# Patient Record
Sex: FEMALE | Race: BLACK OR AFRICAN AMERICAN | Employment: OTHER | URBAN - METROPOLITAN AREA
[De-identification: names, ages, dates, MRNs, and addresses within clinical notes are randomized per-mention and may not be internally consistent; named-entity substitution may affect disease eponyms.]

---

## 2017-01-11 PROBLEM — G43.101 MIGRAINE WITH AURA AND WITH STATUS MIGRAINOSUS, NOT INTRACTABLE: Status: ACTIVE | Noted: 2017-01-11

## 2017-03-17 PROBLEM — I67.2 CEREBRAL ATHEROSCLEROSIS: Status: ACTIVE | Noted: 2017-03-17

## 2017-03-17 PROBLEM — M47.26 OSTEOARTHRITIS OF SPINE WITH RADICULOPATHY, LUMBAR REGION: Status: ACTIVE | Noted: 2017-03-17

## 2017-03-17 PROBLEM — E78.00 HYPERCHOLESTEROLEMIA: Status: ACTIVE | Noted: 2017-03-17

## 2018-04-12 ENCOUNTER — HOSPITAL ENCOUNTER (OUTPATIENT)
Age: 68
Discharge: HOME | End: 2018-04-14
Payer: COMMERCIAL

## 2018-04-12 LAB
ALBUMIN SERPL-MCNC: 4.4 G/DL (ref 3.5–5.2)
ALP BLD-CCNC: 91 U/L (ref 35–104)
ALT SERPL-CCNC: 10 U/L (ref 0–32)
ANION GAP SERPL CALCULATED.3IONS-SCNC: 20 MMOL/L (ref 7–16)
AST SERPL-CCNC: 13 U/L (ref 0–31)
BILIRUB SERPL-MCNC: 0.4 MG/DL (ref 0–1.2)
BUN BLDV-MCNC: 14 MG/DL (ref 8–23)
CALCIUM SERPL-MCNC: 9.7 MG/DL (ref 8.6–10.2)
CHLORIDE BLD-SCNC: 100 MMOL/L (ref 98–107)
CO2: 19 MMOL/L (ref 22–29)
CREAT SERPL-MCNC: 1.2 MG/DL (ref 0.5–1)
GFR AFRICAN AMERICAN: 54
GFR NON-AFRICAN AMERICAN: 54 ML/MIN/1.73
GLUCOSE BLD-MCNC: 99 MG/DL (ref 74–109)
POTASSIUM SERPL-SCNC: 3.8 MMOL/L (ref 3.5–5)
SODIUM BLD-SCNC: 139 MMOL/L (ref 132–146)
TOTAL PROTEIN: 7.7 G/DL (ref 6.4–8.3)

## 2018-04-12 PROCEDURE — 85025 COMPLETE CBC W/AUTO DIFF WBC: CPT

## 2018-04-12 PROCEDURE — 80053 COMPREHEN METABOLIC PANEL: CPT

## 2018-04-12 PROCEDURE — 36415 COLL VENOUS BLD VENIPUNCTURE: CPT

## 2018-04-13 LAB
ANISOCYTOSIS: ABNORMAL
BASOPHILS ABSOLUTE: 0.05 E9/L (ref 0–0.2)
BASOPHILS RELATIVE PERCENT: 0.4 % (ref 0–2)
BURR CELLS: ABNORMAL
EOSINOPHILS ABSOLUTE: 0.21 E9/L (ref 0.05–0.5)
EOSINOPHILS RELATIVE PERCENT: 1.6 % (ref 0–6)
HCT VFR BLD CALC: 36.1 % (ref 34–48)
HEMOGLOBIN: 11.4 G/DL (ref 11.5–15.5)
IMMATURE GRANULOCYTES #: 0.08 E9/L
IMMATURE GRANULOCYTES %: 0.6 % (ref 0–5)
LYMPHOCYTES ABSOLUTE: 2.32 E9/L (ref 1.5–4)
LYMPHOCYTES RELATIVE PERCENT: 17.4 % (ref 20–42)
MCH RBC QN AUTO: 27 PG (ref 26–35)
MCHC RBC AUTO-ENTMCNC: 31.6 % (ref 32–34.5)
MCV RBC AUTO: 85.3 FL (ref 80–99.9)
MONOCYTES ABSOLUTE: 2.45 E9/L (ref 0.1–0.95)
MONOCYTES RELATIVE PERCENT: 18.4 % (ref 2–12)
NEUTROPHILS ABSOLUTE: 8.23 E9/L (ref 1.8–7.3)
NEUTROPHILS RELATIVE PERCENT: 61.6 % (ref 43–80)
OVALOCYTES: ABNORMAL
PDW BLD-RTO: 17.5 FL (ref 11.5–15)
PLATELET # BLD: 436 E9/L (ref 130–450)
PMV BLD AUTO: 11.6 FL (ref 7–12)
POIKILOCYTES: ABNORMAL
POLYCHROMASIA: ABNORMAL
RBC # BLD: 4.23 E12/L (ref 3.5–5.5)
SCHISTOCYTES: ABNORMAL
WBC # BLD: 13.3 E9/L (ref 4.5–11.5)

## 2018-04-14 ENCOUNTER — HOSPITAL ENCOUNTER (OUTPATIENT)
Age: 68
Setting detail: OBSERVATION
LOS: 1 days | Discharge: HOME | End: 2018-04-14
Attending: INTERNAL MEDICINE | Admitting: INTERNAL MEDICINE
Payer: COMMERCIAL

## 2018-04-14 VITALS
OXYGEN SATURATION: 96 % | HEART RATE: 73 BPM | DIASTOLIC BLOOD PRESSURE: 59 MMHG | BODY MASS INDEX: 31.04 KG/M2 | SYSTOLIC BLOOD PRESSURE: 124 MMHG | HEIGHT: 59 IN | WEIGHT: 154 LBS | TEMPERATURE: 97.8 F | RESPIRATION RATE: 16 BRPM

## 2018-04-14 DIAGNOSIS — T78.3XXA ANGIOEDEMA, INITIAL ENCOUNTER: Primary | ICD-10-CM

## 2018-04-14 PROBLEM — I10 ESSENTIAL HYPERTENSION: Chronic | Status: ACTIVE | Noted: 2018-04-14

## 2018-04-14 PROBLEM — I67.2 CEREBRAL ATHEROSCLEROSIS: Status: RESOLVED | Noted: 2017-03-17 | Resolved: 2018-04-14

## 2018-04-14 PROBLEM — M47.26 OSTEOARTHRITIS OF SPINE WITH RADICULOPATHY, LUMBAR REGION: Status: RESOLVED | Noted: 2017-03-17 | Resolved: 2018-04-14

## 2018-04-14 PROBLEM — T46.4X5A ACE INHIBITOR-AGGRAVATED ANGIOEDEMA, INITIAL ENCOUNTER: Status: ACTIVE | Noted: 2018-04-14

## 2018-04-14 PROBLEM — E78.5 HYPERLIPIDEMIA LDL GOAL <100: Chronic | Status: ACTIVE | Noted: 2018-04-14

## 2018-04-14 PROBLEM — G43.101 MIGRAINE WITH AURA AND WITH STATUS MIGRAINOSUS, NOT INTRACTABLE: Status: RESOLVED | Noted: 2017-01-11 | Resolved: 2018-04-14

## 2018-04-14 PROBLEM — E66.9 OBESITY (BMI 30-39.9): Chronic | Status: ACTIVE | Noted: 2018-04-14

## 2018-04-14 PROBLEM — E78.00 HYPERCHOLESTEROLEMIA: Status: RESOLVED | Noted: 2017-03-17 | Resolved: 2018-04-14

## 2018-04-14 LAB
ANION GAP SERPL CALCULATED.3IONS-SCNC: 12 MMOL/L (ref 7–16)
BASOPHILS ABSOLUTE: 0.02 E9/L (ref 0–0.2)
BASOPHILS RELATIVE PERCENT: 0.3 % (ref 0–2)
BUN BLDV-MCNC: 21 MG/DL (ref 8–23)
CALCIUM SERPL-MCNC: 9.1 MG/DL (ref 8.6–10.2)
CHLORIDE BLD-SCNC: 108 MMOL/L (ref 98–107)
CO2: 20 MMOL/L (ref 22–29)
CREAT SERPL-MCNC: 1 MG/DL (ref 0.5–1)
EOSINOPHILS ABSOLUTE: 0 E9/L (ref 0.05–0.5)
EOSINOPHILS RELATIVE PERCENT: 0 % (ref 0–6)
GFR AFRICAN AMERICAN: >60
GFR NON-AFRICAN AMERICAN: >60 ML/MIN/1.73
GLUCOSE BLD-MCNC: 140 MG/DL (ref 74–109)
HBA1C MFR BLD: 5.4 % (ref 4.8–5.9)
HCT VFR BLD CALC: 34.6 % (ref 34–48)
HEMOGLOBIN: 11.3 G/DL (ref 11.5–15.5)
IMMATURE GRANULOCYTES #: 0.04 E9/L
IMMATURE GRANULOCYTES %: 0.6 % (ref 0–5)
LYMPHOCYTES ABSOLUTE: 0.77 E9/L (ref 1.5–4)
LYMPHOCYTES RELATIVE PERCENT: 11.5 % (ref 20–42)
MAGNESIUM: 2.5 MG/DL (ref 1.6–2.6)
MCH RBC QN AUTO: 26.9 PG (ref 26–35)
MCHC RBC AUTO-ENTMCNC: 32.7 % (ref 32–34.5)
MCV RBC AUTO: 82.4 FL (ref 80–99.9)
METER GLUCOSE: 122 MG/DL (ref 70–110)
METER GLUCOSE: 160 MG/DL (ref 70–110)
MONOCYTES ABSOLUTE: 0.09 E9/L (ref 0.1–0.95)
MONOCYTES RELATIVE PERCENT: 1.3 % (ref 2–12)
NEUTROPHILS ABSOLUTE: 5.75 E9/L (ref 1.8–7.3)
NEUTROPHILS RELATIVE PERCENT: 86.3 % (ref 43–80)
PDW BLD-RTO: 16.7 FL (ref 11.5–15)
PLATELET # BLD: 437 E9/L (ref 130–450)
PMV BLD AUTO: 10.8 FL (ref 7–12)
POTASSIUM SERPL-SCNC: 4 MMOL/L (ref 3.5–5)
RBC # BLD: 4.2 E12/L (ref 3.5–5.5)
SODIUM BLD-SCNC: 140 MMOL/L (ref 132–146)
WBC # BLD: 6.7 E9/L (ref 4.5–11.5)

## 2018-04-14 PROCEDURE — 2580000003 HC RX 258: Performed by: INTERNAL MEDICINE

## 2018-04-14 PROCEDURE — 2580000003 HC RX 258: Performed by: EMERGENCY MEDICINE

## 2018-04-14 PROCEDURE — G0378 HOSPITAL OBSERVATION PER HR: HCPCS

## 2018-04-14 PROCEDURE — S0028 INJECTION, FAMOTIDINE, 20 MG: HCPCS | Performed by: EMERGENCY MEDICINE

## 2018-04-14 PROCEDURE — 6370000000 HC RX 637 (ALT 250 FOR IP): Performed by: INTERNAL MEDICINE

## 2018-04-14 PROCEDURE — 80048 BASIC METABOLIC PNL TOTAL CA: CPT

## 2018-04-14 PROCEDURE — 99285 EMERGENCY DEPT VISIT HI MDM: CPT

## 2018-04-14 PROCEDURE — 6360000002 HC RX W HCPCS: Performed by: EMERGENCY MEDICINE

## 2018-04-14 PROCEDURE — 83036 HEMOGLOBIN GLYCOSYLATED A1C: CPT

## 2018-04-14 PROCEDURE — 2500000003 HC RX 250 WO HCPCS: Performed by: EMERGENCY MEDICINE

## 2018-04-14 PROCEDURE — 96375 TX/PRO/DX INJ NEW DRUG ADDON: CPT

## 2018-04-14 PROCEDURE — 82962 GLUCOSE BLOOD TEST: CPT

## 2018-04-14 PROCEDURE — 96374 THER/PROPH/DIAG INJ IV PUSH: CPT

## 2018-04-14 PROCEDURE — 36415 COLL VENOUS BLD VENIPUNCTURE: CPT

## 2018-04-14 PROCEDURE — 83735 ASSAY OF MAGNESIUM: CPT

## 2018-04-14 PROCEDURE — 85025 COMPLETE CBC W/AUTO DIFF WBC: CPT

## 2018-04-14 PROCEDURE — 6360000002 HC RX W HCPCS: Performed by: INTERNAL MEDICINE

## 2018-04-14 PROCEDURE — 94761 N-INVAS EAR/PLS OXIMETRY MLT: CPT

## 2018-04-14 PROCEDURE — 96372 THER/PROPH/DIAG INJ SC/IM: CPT

## 2018-04-14 RX ORDER — 0.9 % SODIUM CHLORIDE 0.9 %
500 INTRAVENOUS SOLUTION INTRAVENOUS ONCE
Status: COMPLETED | OUTPATIENT
Start: 2018-04-14 | End: 2018-04-14

## 2018-04-14 RX ORDER — ACETAMINOPHEN 325 MG/1
650 TABLET ORAL EVERY 4 HOURS PRN
Status: DISCONTINUED | OUTPATIENT
Start: 2018-04-14 | End: 2018-04-14 | Stop reason: SDUPTHER

## 2018-04-14 RX ORDER — FOLIC ACID 1 MG/1
1 TABLET ORAL DAILY
Status: DISCONTINUED | OUTPATIENT
Start: 2018-04-14 | End: 2018-04-14 | Stop reason: HOSPADM

## 2018-04-14 RX ORDER — DEXTROSE MONOHYDRATE 25 G/50ML
12.5 INJECTION, SOLUTION INTRAVENOUS PRN
Status: DISCONTINUED | OUTPATIENT
Start: 2018-04-14 | End: 2018-04-14 | Stop reason: HOSPADM

## 2018-04-14 RX ORDER — DICLOFENAC POTASSIUM 50 MG/1
50 TABLET, FILM COATED ORAL 3 TIMES DAILY PRN
Status: DISCONTINUED | OUTPATIENT
Start: 2018-04-14 | End: 2018-04-14 | Stop reason: CLARIF

## 2018-04-14 RX ORDER — HYDROCHLOROTHIAZIDE 25 MG/1
25 TABLET ORAL DAILY
Qty: 30 TABLET | Refills: 3 | Status: SHIPPED | OUTPATIENT
Start: 2018-04-14 | End: 2020-01-03

## 2018-04-14 RX ORDER — ASPIRIN 81 MG/1
81 TABLET ORAL 2 TIMES DAILY
Status: DISCONTINUED | OUTPATIENT
Start: 2018-04-14 | End: 2018-04-14 | Stop reason: HOSPADM

## 2018-04-14 RX ORDER — ONDANSETRON 2 MG/ML
4 INJECTION INTRAMUSCULAR; INTRAVENOUS EVERY 6 HOURS PRN
Status: DISCONTINUED | OUTPATIENT
Start: 2018-04-14 | End: 2018-04-14 | Stop reason: HOSPADM

## 2018-04-14 RX ORDER — SODIUM CHLORIDE 0.9 % (FLUSH) 0.9 %
10 SYRINGE (ML) INJECTION EVERY 12 HOURS SCHEDULED
Status: DISCONTINUED | OUTPATIENT
Start: 2018-04-14 | End: 2018-04-14 | Stop reason: HOSPADM

## 2018-04-14 RX ORDER — DIPHENHYDRAMINE HYDROCHLORIDE 50 MG/ML
50 INJECTION INTRAMUSCULAR; INTRAVENOUS ONCE
Status: COMPLETED | OUTPATIENT
Start: 2018-04-14 | End: 2018-04-14

## 2018-04-14 RX ORDER — DEXTROSE MONOHYDRATE 50 MG/ML
100 INJECTION, SOLUTION INTRAVENOUS PRN
Status: DISCONTINUED | OUTPATIENT
Start: 2018-04-14 | End: 2018-04-14 | Stop reason: HOSPADM

## 2018-04-14 RX ORDER — SODIUM CHLORIDE 0.9 % (FLUSH) 0.9 %
10 SYRINGE (ML) INJECTION PRN
Status: DISCONTINUED | OUTPATIENT
Start: 2018-04-14 | End: 2018-04-14 | Stop reason: HOSPADM

## 2018-04-14 RX ORDER — DIPHENHYDRAMINE HCL 25 MG
25 TABLET ORAL EVERY 6 HOURS PRN
Qty: 30 TABLET | Refills: 0 | Status: SHIPPED | OUTPATIENT
Start: 2018-04-14 | End: 2018-05-14

## 2018-04-14 RX ORDER — ACETAMINOPHEN 325 MG/1
650 TABLET ORAL EVERY 4 HOURS PRN
Status: DISCONTINUED | OUTPATIENT
Start: 2018-04-14 | End: 2018-04-14 | Stop reason: HOSPADM

## 2018-04-14 RX ORDER — PREDNISONE 1 MG/1
5 TABLET ORAL
Status: DISCONTINUED | OUTPATIENT
Start: 2018-04-14 | End: 2018-04-14 | Stop reason: HOSPADM

## 2018-04-14 RX ORDER — SODIUM CHLORIDE 0.9 % (FLUSH) 0.9 %
10 SYRINGE (ML) INJECTION EVERY 12 HOURS SCHEDULED
Status: DISCONTINUED | OUTPATIENT
Start: 2018-04-14 | End: 2018-04-14 | Stop reason: SDUPTHER

## 2018-04-14 RX ORDER — PANTOPRAZOLE SODIUM 40 MG/1
40 TABLET, DELAYED RELEASE ORAL
Status: DISCONTINUED | OUTPATIENT
Start: 2018-04-14 | End: 2018-04-14 | Stop reason: HOSPADM

## 2018-04-14 RX ORDER — IBUPROFEN 600 MG/1
600 TABLET ORAL 3 TIMES DAILY PRN
Status: DISCONTINUED | OUTPATIENT
Start: 2018-04-14 | End: 2018-04-14 | Stop reason: HOSPADM

## 2018-04-14 RX ORDER — DIPHENHYDRAMINE HCL 25 MG
25 TABLET ORAL EVERY 6 HOURS PRN
Status: DISCONTINUED | OUTPATIENT
Start: 2018-04-14 | End: 2018-04-14 | Stop reason: HOSPADM

## 2018-04-14 RX ORDER — SODIUM CHLORIDE 0.9 % (FLUSH) 0.9 %
10 SYRINGE (ML) INJECTION PRN
Status: DISCONTINUED | OUTPATIENT
Start: 2018-04-14 | End: 2018-04-14 | Stop reason: SDUPTHER

## 2018-04-14 RX ORDER — DEXAMETHASONE SODIUM PHOSPHATE 10 MG/ML
10 INJECTION INTRAMUSCULAR; INTRAVENOUS ONCE
Status: COMPLETED | OUTPATIENT
Start: 2018-04-14 | End: 2018-04-14

## 2018-04-14 RX ORDER — ATORVASTATIN CALCIUM 20 MG/1
20 TABLET, FILM COATED ORAL NIGHTLY
Status: DISCONTINUED | OUTPATIENT
Start: 2018-04-14 | End: 2018-04-14 | Stop reason: HOSPADM

## 2018-04-14 RX ORDER — NICOTINE POLACRILEX 4 MG
15 LOZENGE BUCCAL PRN
Status: DISCONTINUED | OUTPATIENT
Start: 2018-04-14 | End: 2018-04-14 | Stop reason: HOSPADM

## 2018-04-14 RX ORDER — METHYLPREDNISOLONE SODIUM SUCCINATE 125 MG/2ML
125 INJECTION, POWDER, LYOPHILIZED, FOR SOLUTION INTRAMUSCULAR; INTRAVENOUS ONCE
Status: COMPLETED | OUTPATIENT
Start: 2018-04-14 | End: 2018-04-14

## 2018-04-14 RX ORDER — METHYLPREDNISOLONE SODIUM SUCCINATE 40 MG/ML
40 INJECTION, POWDER, LYOPHILIZED, FOR SOLUTION INTRAMUSCULAR; INTRAVENOUS DAILY
Status: DISCONTINUED | OUTPATIENT
Start: 2018-04-15 | End: 2018-04-14 | Stop reason: HOSPADM

## 2018-04-14 RX ORDER — SODIUM CHLORIDE 9 MG/ML
INJECTION, SOLUTION INTRAVENOUS CONTINUOUS
Status: DISCONTINUED | OUTPATIENT
Start: 2018-04-14 | End: 2018-04-14 | Stop reason: HOSPADM

## 2018-04-14 RX ORDER — GABAPENTIN 400 MG/1
400 CAPSULE ORAL EVERY 6 HOURS
Status: DISCONTINUED | OUTPATIENT
Start: 2018-04-14 | End: 2018-04-14 | Stop reason: HOSPADM

## 2018-04-14 RX ADMIN — DEXAMETHASONE SODIUM PHOSPHATE 10 MG: 10 INJECTION INTRAMUSCULAR; INTRAVENOUS at 02:50

## 2018-04-14 RX ADMIN — PANTOPRAZOLE SODIUM 40 MG: 40 TABLET, DELAYED RELEASE ORAL at 10:40

## 2018-04-14 RX ADMIN — Medication 10 ML: at 10:41

## 2018-04-14 RX ADMIN — FAMOTIDINE 20 MG: 10 INJECTION INTRAVENOUS at 02:50

## 2018-04-14 RX ADMIN — DIPHENHYDRAMINE HYDROCHLORIDE 50 MG: 50 INJECTION, SOLUTION INTRAMUSCULAR; INTRAVENOUS at 02:50

## 2018-04-14 RX ADMIN — ASPIRIN 81 MG: 81 TABLET, COATED ORAL at 10:40

## 2018-04-14 RX ADMIN — METHYLPREDNISOLONE SODIUM SUCCINATE 125 MG: 125 INJECTION, POWDER, FOR SOLUTION INTRAMUSCULAR; INTRAVENOUS at 02:50

## 2018-04-14 RX ADMIN — GABAPENTIN 400 MG: 400 CAPSULE ORAL at 10:40

## 2018-04-14 RX ADMIN — ACETAMINOPHEN 650 MG: 325 TABLET ORAL at 10:49

## 2018-04-14 RX ADMIN — PREDNISONE 5 MG: 5 TABLET ORAL at 11:36

## 2018-04-14 RX ADMIN — ENOXAPARIN SODIUM 40 MG: 40 INJECTION, SOLUTION INTRAVENOUS; SUBCUTANEOUS at 10:40

## 2018-04-14 RX ADMIN — SODIUM CHLORIDE: 9 INJECTION, SOLUTION INTRAVENOUS at 10:40

## 2018-04-14 RX ADMIN — FOLIC ACID 1 MG: 1 TABLET ORAL at 10:40

## 2018-04-14 RX ADMIN — SODIUM CHLORIDE 500 ML: 9 INJECTION, SOLUTION INTRAVENOUS at 02:49

## 2018-04-14 RX ADMIN — INSULIN LISPRO 1 UNITS: 100 INJECTION, SOLUTION INTRAVENOUS; SUBCUTANEOUS at 11:36

## 2018-04-14 ASSESSMENT — PAIN SCALES - GENERAL
PAINLEVEL_OUTOF10: 3
PAINLEVEL_OUTOF10: 0

## 2018-04-14 ASSESSMENT — PAIN DESCRIPTION - PROGRESSION: CLINICAL_PROGRESSION: GRADUALLY WORSENING

## 2018-04-14 ASSESSMENT — PAIN DESCRIPTION - FREQUENCY: FREQUENCY: CONTINUOUS

## 2018-04-14 ASSESSMENT — PAIN DESCRIPTION - ONSET: ONSET: GRADUAL

## 2018-04-14 ASSESSMENT — PAIN DESCRIPTION - DESCRIPTORS: DESCRIPTORS: ACHING;DISCOMFORT;CONSTANT

## 2018-04-14 ASSESSMENT — PAIN DESCRIPTION - PAIN TYPE: TYPE: CHRONIC PAIN

## 2018-04-14 ASSESSMENT — PAIN DESCRIPTION - ORIENTATION: ORIENTATION: MID

## 2018-04-14 ASSESSMENT — PAIN DESCRIPTION - LOCATION: LOCATION: BACK

## 2018-04-17 PROBLEM — M51.16 INTERVERTEBRAL DISC DISORDERS WITH RADICULOPATHY, LUMBAR REGION: Status: ACTIVE | Noted: 2018-04-17

## 2018-08-23 ENCOUNTER — HOSPITAL ENCOUNTER (OUTPATIENT)
Age: 68
Discharge: HOME | End: 2018-08-25
Payer: COMMERCIAL

## 2018-08-23 LAB
ALBUMIN SERPL-MCNC: 4.6 G/DL (ref 3.5–5.2)
ALP BLD-CCNC: 87 U/L (ref 35–104)
ALT SERPL-CCNC: 9 U/L (ref 0–32)
ANION GAP SERPL CALCULATED.3IONS-SCNC: 16 MMOL/L (ref 7–16)
ANISOCYTOSIS: ABNORMAL
AST SERPL-CCNC: 15 U/L (ref 0–31)
BASOPHILS ABSOLUTE: 0.05 E9/L (ref 0–0.2)
BASOPHILS RELATIVE PERCENT: 0.4 % (ref 0–2)
BILIRUB SERPL-MCNC: 0.3 MG/DL (ref 0–1.2)
BUN BLDV-MCNC: 14 MG/DL (ref 8–23)
BURR CELLS: ABNORMAL
CALCIUM SERPL-MCNC: 9.5 MG/DL (ref 8.6–10.2)
CHLORIDE BLD-SCNC: 101 MMOL/L (ref 98–107)
CO2: 22 MMOL/L (ref 22–29)
CREAT SERPL-MCNC: 1.3 MG/DL (ref 0.5–1)
EOSINOPHILS ABSOLUTE: 0.03 E9/L (ref 0.05–0.5)
EOSINOPHILS RELATIVE PERCENT: 0.3 % (ref 0–6)
GFR AFRICAN AMERICAN: 50
GFR NON-AFRICAN AMERICAN: 50 ML/MIN/1.73
GLUCOSE BLD-MCNC: 109 MG/DL (ref 74–109)
HCT VFR BLD CALC: 36.6 % (ref 34–48)
HEMOGLOBIN: 11.2 G/DL (ref 11.5–15.5)
HYPOCHROMIA: ABNORMAL
IMMATURE GRANULOCYTES #: 0.06 E9/L
IMMATURE GRANULOCYTES %: 0.5 % (ref 0–5)
LYMPHOCYTES ABSOLUTE: 2.42 E9/L (ref 1.5–4)
LYMPHOCYTES RELATIVE PERCENT: 21.7 % (ref 20–42)
MCH RBC QN AUTO: 24.6 PG (ref 26–35)
MCHC RBC AUTO-ENTMCNC: 30.6 % (ref 32–34.5)
MCV RBC AUTO: 80.4 FL (ref 80–99.9)
MONOCYTES ABSOLUTE: 0.65 E9/L (ref 0.1–0.95)
MONOCYTES RELATIVE PERCENT: 5.8 % (ref 2–12)
NEUTROPHILS ABSOLUTE: 7.92 E9/L (ref 1.8–7.3)
NEUTROPHILS RELATIVE PERCENT: 71.3 % (ref 43–80)
OVALOCYTES: ABNORMAL
PDW BLD-RTO: 22.5 FL (ref 11.5–15)
PLATELET # BLD: 444 E9/L (ref 130–450)
PMV BLD AUTO: 11.3 FL (ref 7–12)
POIKILOCYTES: ABNORMAL
POLYCHROMASIA: ABNORMAL
POTASSIUM SERPL-SCNC: 4.9 MMOL/L (ref 3.5–5)
RBC # BLD: 4.55 E12/L (ref 3.5–5.5)
SCHISTOCYTES: ABNORMAL
SODIUM BLD-SCNC: 139 MMOL/L (ref 132–146)
TARGET CELLS: ABNORMAL
TOTAL PROTEIN: 8.6 G/DL (ref 6.4–8.3)
WBC # BLD: 11.1 E9/L (ref 4.5–11.5)

## 2018-08-23 PROCEDURE — 85025 COMPLETE CBC W/AUTO DIFF WBC: CPT

## 2018-08-23 PROCEDURE — 80053 COMPREHEN METABOLIC PANEL: CPT

## 2018-09-25 ENCOUNTER — HOSPITAL ENCOUNTER (OUTPATIENT)
Dept: GENERAL RADIOLOGY | Age: 68
Discharge: HOME | End: 2018-09-27
Payer: COMMERCIAL

## 2018-09-25 DIAGNOSIS — R13.10 DYSPHAGIA, UNSPECIFIED TYPE: ICD-10-CM

## 2018-09-25 PROCEDURE — 92611 MOTION FLUOROSCOPY/SWALLOW: CPT

## 2018-09-25 PROCEDURE — 74230 X-RAY XM SWLNG FUNCJ C+: CPT

## 2018-09-25 PROCEDURE — G8996 SWALLOW CURRENT STATUS: HCPCS

## 2018-09-25 PROCEDURE — 2500000003 HC RX 250 WO HCPCS: Performed by: INTERNAL MEDICINE

## 2018-09-25 PROCEDURE — G8998 SWALLOW D/C STATUS: HCPCS

## 2018-09-25 PROCEDURE — G8997 SWALLOW GOAL STATUS: HCPCS

## 2018-09-25 RX ADMIN — BARIUM SULFATE 44 G: 0.6 CREAM ORAL at 11:53

## 2018-09-25 RX ADMIN — BARIUM SULFATE 88 ML: 960 POWDER, FOR SUSPENSION ORAL at 11:53

## 2018-09-25 NOTE — PROGRESS NOTES
pharyngeal wall      These residuals were noted for []thin []nectar[] honey [x]pureed [x]solid)        Which [x]cleared []did not clear []partially cleared []mostly cleared       With [x]cued []spontaneous     [x]double swallow []multiple swallow (  times)       [x]liquid chaser      []Residuals in the vallecula due to inadequate epiglottic inversion        These residuals were noted for []thin []nectar[] honey []pureed []solid)        Which  []cleared []did not clear []partially cleared []mostly cleared       With []cued []spontaneous     []double swallow []multiple swallow (  times)       []liquid chaser        Pyriform Sinuses    []No significant residuals were noted in the pyriform sinuses      [x] Residuals in the pyriform sinuses were noted due to      [x]pharyngeal weakness      []cricopharyngeal dysfunction. These residuals were noted for []thin []nectar[] honey [x]pureed [x]solid        Which  [x]cleared []did not clear []partially cleared []mostly cleared        With [x]cued []spontaneous     [x]double swallow []multiple swallow (  times)       [x]liquid chaser      LARYNGEAL PENETRATION   [x]Laryngeal penetration was not present during this evaluation      []Laryngeal penetration occurred prior to aspiration. Further details under aspiration section. []Laryngeal penetration occurred in the absence of aspiration:       []BEFORE the swallow for []thin []nectar []honey[] pureed []solid           due to: []decreased bolus formation       []premature pharyngeal entry       []delayed pharyngeal swallow           which  []cleared from the laryngeal vestibule spontaneously (transient)     []cleared from the laryngeal vestibule with a cued, re-directive throat clear       []remained in the laryngeal vestibule.       []penetrated deep into the laryngeal vestibule (to the level of the true vocal folds)      Laryngeal penetration was  []trace []mild []moderate []marked []severe          And occurred

## 2018-10-02 ENCOUNTER — OFFICE VISIT (OUTPATIENT)
Dept: ORTHOPEDIC SURGERY | Age: 68
End: 2018-10-02
Payer: COMMERCIAL

## 2018-10-02 VITALS
BODY MASS INDEX: 30.24 KG/M2 | SYSTOLIC BLOOD PRESSURE: 133 MMHG | HEIGHT: 59 IN | TEMPERATURE: 97.7 F | HEART RATE: 73 BPM | WEIGHT: 150 LBS | DIASTOLIC BLOOD PRESSURE: 72 MMHG

## 2018-10-02 DIAGNOSIS — M79.642 LEFT HAND PAIN: Primary | ICD-10-CM

## 2018-10-02 PROCEDURE — 1101F PT FALLS ASSESS-DOCD LE1/YR: CPT | Performed by: ORTHOPAEDIC SURGERY

## 2018-10-02 PROCEDURE — G8417 CALC BMI ABV UP PARAM F/U: HCPCS | Performed by: ORTHOPAEDIC SURGERY

## 2018-10-02 PROCEDURE — 3017F COLORECTAL CA SCREEN DOC REV: CPT | Performed by: ORTHOPAEDIC SURGERY

## 2018-10-02 PROCEDURE — G8598 ASA/ANTIPLAT THER USED: HCPCS | Performed by: ORTHOPAEDIC SURGERY

## 2018-10-02 PROCEDURE — G8427 DOCREV CUR MEDS BY ELIG CLIN: HCPCS | Performed by: ORTHOPAEDIC SURGERY

## 2018-10-02 PROCEDURE — 99203 OFFICE O/P NEW LOW 30 MIN: CPT | Performed by: ORTHOPAEDIC SURGERY

## 2018-10-02 PROCEDURE — 1123F ACP DISCUSS/DSCN MKR DOCD: CPT | Performed by: ORTHOPAEDIC SURGERY

## 2018-10-02 PROCEDURE — 1090F PRES/ABSN URINE INCON ASSESS: CPT | Performed by: ORTHOPAEDIC SURGERY

## 2018-10-02 PROCEDURE — G8484 FLU IMMUNIZE NO ADMIN: HCPCS | Performed by: ORTHOPAEDIC SURGERY

## 2018-10-02 PROCEDURE — 4004F PT TOBACCO SCREEN RCVD TLK: CPT | Performed by: ORTHOPAEDIC SURGERY

## 2018-10-02 PROCEDURE — 4040F PNEUMOC VAC/ADMIN/RCVD: CPT | Performed by: ORTHOPAEDIC SURGERY

## 2018-10-02 PROCEDURE — G8399 PT W/DXA RESULTS DOCUMENT: HCPCS | Performed by: ORTHOPAEDIC SURGERY

## 2018-10-02 RX ORDER — HYDROCODONE BITARTRATE AND ACETAMINOPHEN 7.5; 325 MG/1; MG/1
1 TABLET ORAL EVERY 8 HOURS PRN
COMMUNITY

## 2018-10-02 RX ORDER — METHOCARBAMOL 750 MG/1
750 TABLET, FILM COATED ORAL 3 TIMES DAILY
COMMUNITY

## 2018-10-02 RX ORDER — FAMOTIDINE 20 MG/1
20 TABLET, FILM COATED ORAL 2 TIMES DAILY
COMMUNITY

## 2018-10-02 RX ORDER — LISINOPRIL AND HYDROCHLOROTHIAZIDE 12.5; 1 MG/1; MG/1
1 TABLET ORAL DAILY
COMMUNITY

## 2018-10-02 NOTE — PROGRESS NOTES
Chief Complaint:   Chief Complaint   Patient presents with    Finger Pain     Lt 5th finger pain, referred by Dr Karli Dunham for consult. No x-rays       HPI     Valentine Mittal is a 79 y.o. female, who presents With occasional pain and transient mechanical difficulty with a left small finger, onset has been indeterminant and symptoms are intermittent. Patient states this was evaluated by her rheumatologist and he referred her here for evaluation. She has had long-standing rheumatoid arthritis and is on disease modifying agents which for the most part have been controlling her previous symptoms. She is left-hand dominant homemaker. No numbness tingling in the fingers no other active joint complaints. She is status post bilateral shoulder surgery performed elsewhere. No active issues and those regards. Allergies; medications; past medical, surgical, family, and social history; and problem list have been reviewed today and updated as indicated in this encounter - see below following Ortho specifics. Musculoskeletal: Upper extremity show no deformities, no active synovitis in the hands. Patient exhibits symmetric hyperextensibility of the MP and PIP joints bilaterally without contracture or swan necking. Following full active extension of the MPs and  there is mild snapping of the small finger on the right hand without active triggering, flexor tendons are intact without tenderness or locking. Radiologic Studies: Deferred    ASSESSMENT/PLAN:    Elke Medellin was seen today for finger pain. Diagnoses and all orders for this visit:    Left hand pain  -     Cancel: XR HAND LEFT (MIN 3 VIEWS)     I think the patient is having mild functional snapping of the finger without pathological issue, no evidence for tendon or significant synovitis problems at this time. Reassurance was offered and accepted, questions were asked and answered, follow-up as needed. Return if symptoms worsen or fail to improve.        Kiki Nuñez Charles Hurley MD    10/2/2018  5:01 PM      Patient Active Problem List   Diagnosis    GERD (gastroesophageal reflux disease)    Obesity (BMI 30-39. 9)    Hyperlipidemia LDL goal <100    Essential hypertension    Angioedema of lips    ACE inhibitor-aggravated angioedema, initial encounter    Intervertebral disc disorders with radiculopathy, lumbar region    Chronic migraine       Past Medical History:   Diagnosis Date    Acid reflux     Chronic back pain     Constipation     Headache(784.0)     History of sinus problem     Hyperlipidemia     Hypertension     Memory loss, short term     Neuropathy     Osteoarthritis     Pain in neck     Rheumatoid arthritis(714.0)     Dr. Aravind Cole in ears     TIA (transient ischemic attack)        Past Surgical History:   Procedure Laterality Date    CARDIOVASCULAR STRESS TEST  2003    normal    COLONOSCOPY  2006    Normal.  Re-referred for diagnostic on 9/10    HYSTERECTOMY      SHOULDER SURGERY Left     SHOULDER SURGERY Right     TOOTH EXTRACTION Right October 2014    TUBAL LIGATION      1978       Current Outpatient Prescriptions   Medication Sig Dispense Refill    HYDROcodone-acetaminophen ([address]) 7.5-325 MG per tablet Take 1 tablet by mouth every 8 hours as needed for Pain. Laurie Peppers lisinopril-hydrochlorothiazide (PRINZIDE;ZESTORETIC) 10-12.5 MG per tablet Take 1 tablet by mouth daily      famotidine (PEPCID) 20 MG tablet Take 20 mg by mouth 2 times daily      methocarbamol (ROBAXIN) 750 MG tablet Take 750 mg by mouth 3 times daily      gabapentin (NEURONTIN) 600 MG tablet Take 1 tablet by mouth 4 times daily for 30 days. . 120 tablet 5    aspirin 81 MG EC tablet Take 1 tablet by mouth 2 times daily 60 tablet 5    diclofenac (CATAFLAM) 50 MG tablet Take 1 tablet by mouth 3 times daily as needed (early migraine) 90 tablet 5    atorvastatin (LIPITOR) 20 MG tablet Take 1 tablet by mouth daily 30 tablet 5    diclofenac sodium 1 % GEL Apply 2 g

## 2018-11-01 ENCOUNTER — HOSPITAL ENCOUNTER (OUTPATIENT)
Age: 68
Discharge: HOME | End: 2018-11-03
Payer: COMMERCIAL

## 2018-11-01 LAB
ALBUMIN SERPL-MCNC: 4.3 G/DL (ref 3.5–5.2)
ALP BLD-CCNC: 65 U/L (ref 35–104)
ALT SERPL-CCNC: 8 U/L (ref 0–32)
ANION GAP SERPL CALCULATED.3IONS-SCNC: 13 MMOL/L (ref 7–16)
ANISOCYTOSIS: ABNORMAL
AST SERPL-CCNC: 14 U/L (ref 0–31)
BASOPHILS ABSOLUTE: 0.06 E9/L (ref 0–0.2)
BASOPHILS RELATIVE PERCENT: 0.7 % (ref 0–2)
BILIRUB SERPL-MCNC: 0.2 MG/DL (ref 0–1.2)
BUN BLDV-MCNC: 12 MG/DL (ref 8–23)
BURR CELLS: ABNORMAL
C-REACTIVE PROTEIN: 0.2 MG/DL (ref 0–0.4)
CALCIUM SERPL-MCNC: 9.2 MG/DL (ref 8.6–10.2)
CHLORIDE BLD-SCNC: 104 MMOL/L (ref 98–107)
CO2: 23 MMOL/L (ref 22–29)
CREAT SERPL-MCNC: 1.1 MG/DL (ref 0.5–1)
EOSINOPHILS ABSOLUTE: 0.16 E9/L (ref 0.05–0.5)
EOSINOPHILS RELATIVE PERCENT: 2 % (ref 0–6)
GFR AFRICAN AMERICAN: >60
GFR NON-AFRICAN AMERICAN: >60 ML/MIN/1.73
GLUCOSE BLD-MCNC: 90 MG/DL (ref 74–109)
HCT VFR BLD CALC: 34.6 % (ref 34–48)
HEMOGLOBIN: 10.7 G/DL (ref 11.5–15.5)
IMMATURE GRANULOCYTES #: 0.04 E9/L
IMMATURE GRANULOCYTES %: 0.5 % (ref 0–5)
LYMPHOCYTES ABSOLUTE: 1.2 E9/L (ref 1.5–4)
LYMPHOCYTES RELATIVE PERCENT: 14.6 % (ref 20–42)
MCH RBC QN AUTO: 24.9 PG (ref 26–35)
MCHC RBC AUTO-ENTMCNC: 30.9 % (ref 32–34.5)
MCV RBC AUTO: 80.5 FL (ref 80–99.9)
MONOCYTES ABSOLUTE: 0.77 E9/L (ref 0.1–0.95)
MONOCYTES RELATIVE PERCENT: 9.4 % (ref 2–12)
NEUTROPHILS ABSOLUTE: 5.97 E9/L (ref 1.8–7.3)
NEUTROPHILS RELATIVE PERCENT: 72.8 % (ref 43–80)
OVALOCYTES: ABNORMAL
PDW BLD-RTO: 21.8 FL (ref 11.5–15)
PLATELET # BLD: 454 E9/L (ref 130–450)
PMV BLD AUTO: 10.7 FL (ref 7–12)
POIKILOCYTES: ABNORMAL
POTASSIUM SERPL-SCNC: 3.8 MMOL/L (ref 3.5–5)
RBC # BLD: 4.3 E12/L (ref 3.5–5.5)
SCHISTOCYTES: ABNORMAL
SODIUM BLD-SCNC: 140 MMOL/L (ref 132–146)
TARGET CELLS: ABNORMAL
TOTAL PROTEIN: 7.7 G/DL (ref 6.4–8.3)
WBC # BLD: 8.2 E9/L (ref 4.5–11.5)

## 2018-11-01 PROCEDURE — 85025 COMPLETE CBC W/AUTO DIFF WBC: CPT

## 2018-11-01 PROCEDURE — 80053 COMPREHEN METABOLIC PANEL: CPT

## 2018-11-01 PROCEDURE — 36415 COLL VENOUS BLD VENIPUNCTURE: CPT

## 2018-11-01 PROCEDURE — 86140 C-REACTIVE PROTEIN: CPT

## 2019-01-08 ENCOUNTER — OFFICE VISIT (OUTPATIENT)
Dept: NEUROLOGY | Age: 69
End: 2019-01-08
Payer: COMMERCIAL

## 2019-01-08 VITALS
SYSTOLIC BLOOD PRESSURE: 130 MMHG | DIASTOLIC BLOOD PRESSURE: 80 MMHG | BODY MASS INDEX: 27.82 KG/M2 | RESPIRATION RATE: 12 BRPM | OXYGEN SATURATION: 99 % | WEIGHT: 138 LBS | HEIGHT: 59 IN | HEART RATE: 108 BPM

## 2019-01-08 DIAGNOSIS — M54.5 CHRONIC BILATERAL LOW BACK PAIN, WITH SCIATICA PRESENCE UNSPECIFIED: ICD-10-CM

## 2019-01-08 DIAGNOSIS — M54.2 CERVICALGIA: ICD-10-CM

## 2019-01-08 DIAGNOSIS — G44.40 ANALGESIC REBOUND HEADACHE: ICD-10-CM

## 2019-01-08 DIAGNOSIS — G89.29 CHRONIC BILATERAL LOW BACK PAIN, WITH SCIATICA PRESENCE UNSPECIFIED: ICD-10-CM

## 2019-01-08 DIAGNOSIS — T39.95XA ANALGESIC REBOUND HEADACHE: ICD-10-CM

## 2019-01-08 DIAGNOSIS — M54.12 CERVICAL RADICULOPATHY, CHRONIC: ICD-10-CM

## 2019-01-08 DIAGNOSIS — R51.9 CHRONIC DAILY HEADACHE: Primary | ICD-10-CM

## 2019-01-08 DIAGNOSIS — M06.9 RHEUMATOID ARTHRITIS INVOLVING MULTIPLE SITES, UNSPECIFIED RHEUMATOID FACTOR PRESENCE: ICD-10-CM

## 2019-01-08 PROCEDURE — G8484 FLU IMMUNIZE NO ADMIN: HCPCS | Performed by: PSYCHIATRY & NEUROLOGY

## 2019-01-08 PROCEDURE — G8427 DOCREV CUR MEDS BY ELIG CLIN: HCPCS | Performed by: PSYCHIATRY & NEUROLOGY

## 2019-01-08 PROCEDURE — 4004F PT TOBACCO SCREEN RCVD TLK: CPT | Performed by: PSYCHIATRY & NEUROLOGY

## 2019-01-08 PROCEDURE — 1123F ACP DISCUSS/DSCN MKR DOCD: CPT | Performed by: PSYCHIATRY & NEUROLOGY

## 2019-01-08 PROCEDURE — G8417 CALC BMI ABV UP PARAM F/U: HCPCS | Performed by: PSYCHIATRY & NEUROLOGY

## 2019-01-08 PROCEDURE — 4040F PNEUMOC VAC/ADMIN/RCVD: CPT | Performed by: PSYCHIATRY & NEUROLOGY

## 2019-01-08 PROCEDURE — 1090F PRES/ABSN URINE INCON ASSESS: CPT | Performed by: PSYCHIATRY & NEUROLOGY

## 2019-01-08 PROCEDURE — 3017F COLORECTAL CA SCREEN DOC REV: CPT | Performed by: PSYCHIATRY & NEUROLOGY

## 2019-01-08 PROCEDURE — 1101F PT FALLS ASSESS-DOCD LE1/YR: CPT | Performed by: PSYCHIATRY & NEUROLOGY

## 2019-01-08 PROCEDURE — 99204 OFFICE O/P NEW MOD 45 MIN: CPT | Performed by: PSYCHIATRY & NEUROLOGY

## 2019-01-08 PROCEDURE — G8399 PT W/DXA RESULTS DOCUMENT: HCPCS | Performed by: PSYCHIATRY & NEUROLOGY

## 2019-01-08 RX ORDER — ADALIMUMAB 40MG/0.4ML
KIT SUBCUTANEOUS
COMMUNITY
Start: 2018-12-04 | End: 2019-01-08 | Stop reason: SDUPTHER

## 2019-01-08 RX ORDER — SUMATRIPTAN 100 MG/1
50 TABLET, FILM COATED ORAL
Qty: 9 TABLET | Refills: 2 | Status: SHIPPED | OUTPATIENT
Start: 2019-01-08 | End: 2020-01-03

## 2019-01-08 RX ORDER — GABAPENTIN 400 MG/1
400 CAPSULE ORAL 4 TIMES DAILY
Qty: 90 CAPSULE | Refills: 0 | Status: SHIPPED | OUTPATIENT
Start: 2019-01-08 | End: 2020-01-03

## 2019-01-08 ASSESSMENT — ENCOUNTER SYMPTOMS
RESPIRATORY NEGATIVE: 1
EYES NEGATIVE: 1
BACK PAIN: 1
GASTROINTESTINAL NEGATIVE: 1
ALLERGIC/IMMUNOLOGIC NEGATIVE: 1

## 2019-01-24 ENCOUNTER — HOSPITAL ENCOUNTER (OUTPATIENT)
Age: 69
Discharge: HOME | End: 2019-01-26
Payer: COMMERCIAL

## 2019-01-24 LAB
ALBUMIN SERPL-MCNC: 4.1 G/DL (ref 3.5–5.2)
ALP BLD-CCNC: 70 U/L (ref 35–104)
ALT SERPL-CCNC: 36 U/L (ref 0–32)
ANION GAP SERPL CALCULATED.3IONS-SCNC: 18 MMOL/L (ref 7–16)
ANISOCYTOSIS: ABNORMAL
AST SERPL-CCNC: 27 U/L (ref 0–31)
BASOPHILS ABSOLUTE: 0.06 E9/L (ref 0–0.2)
BASOPHILS RELATIVE PERCENT: 0.6 % (ref 0–2)
BILIRUB SERPL-MCNC: 0.3 MG/DL (ref 0–1.2)
BUN BLDV-MCNC: 19 MG/DL (ref 8–23)
BURR CELLS: ABNORMAL
CALCIUM SERPL-MCNC: 9.1 MG/DL (ref 8.6–10.2)
CHLORIDE BLD-SCNC: 102 MMOL/L (ref 98–107)
CO2: 20 MMOL/L (ref 22–29)
CREAT SERPL-MCNC: 1.3 MG/DL (ref 0.5–1)
EOSINOPHILS ABSOLUTE: 0.21 E9/L (ref 0.05–0.5)
EOSINOPHILS RELATIVE PERCENT: 2.2 % (ref 0–6)
GFR AFRICAN AMERICAN: 50
GFR NON-AFRICAN AMERICAN: 50 ML/MIN/1.73
GLUCOSE BLD-MCNC: 84 MG/DL (ref 74–99)
HCT VFR BLD CALC: 32.1 % (ref 34–48)
HEMOGLOBIN: 10.2 G/DL (ref 11.5–15.5)
HYPOCHROMIA: ABNORMAL
IMMATURE GRANULOCYTES #: 0.09 E9/L
IMMATURE GRANULOCYTES %: 1 % (ref 0–5)
LYMPHOCYTES ABSOLUTE: 2.47 E9/L (ref 1.5–4)
LYMPHOCYTES RELATIVE PERCENT: 26.2 % (ref 20–42)
MCH RBC QN AUTO: 26.5 PG (ref 26–35)
MCHC RBC AUTO-ENTMCNC: 31.8 % (ref 32–34.5)
MCV RBC AUTO: 83.4 FL (ref 80–99.9)
MONOCYTES ABSOLUTE: 0.84 E9/L (ref 0.1–0.95)
MONOCYTES RELATIVE PERCENT: 8.9 % (ref 2–12)
NEUTROPHILS ABSOLUTE: 5.75 E9/L (ref 1.8–7.3)
NEUTROPHILS RELATIVE PERCENT: 61.1 % (ref 43–80)
PDW BLD-RTO: 20.4 FL (ref 11.5–15)
PLATELET # BLD: 469 E9/L (ref 130–450)
PMV BLD AUTO: 11.2 FL (ref 7–12)
POIKILOCYTES: ABNORMAL
POLYCHROMASIA: ABNORMAL
POTASSIUM SERPL-SCNC: 4.2 MMOL/L (ref 3.5–5)
RBC # BLD: 3.85 E12/L (ref 3.5–5.5)
RHEUMATOID FACTOR: 77 IU/ML (ref 0–13)
SCHISTOCYTES: ABNORMAL
SODIUM BLD-SCNC: 140 MMOL/L (ref 132–146)
TARGET CELLS: ABNORMAL
TOTAL PROTEIN: 7.8 G/DL (ref 6.4–8.3)
WBC # BLD: 9.4 E9/L (ref 4.5–11.5)

## 2019-01-24 PROCEDURE — 85025 COMPLETE CBC W/AUTO DIFF WBC: CPT

## 2019-01-24 PROCEDURE — 86431 RHEUMATOID FACTOR QUANT: CPT

## 2019-01-24 PROCEDURE — 80053 COMPREHEN METABOLIC PANEL: CPT

## 2019-01-24 PROCEDURE — 86200 CCP ANTIBODY: CPT

## 2019-01-24 PROCEDURE — 36415 COLL VENOUS BLD VENIPUNCTURE: CPT

## 2019-01-27 LAB — CCP IGG ANTIBODIES: 170 UNITS (ref 0–19)

## 2019-04-25 ENCOUNTER — HOSPITAL ENCOUNTER (OUTPATIENT)
Age: 69
Discharge: HOME | End: 2019-04-27
Payer: COMMERCIAL

## 2019-04-25 LAB
ACANTHOCYTES: ABNORMAL
ALBUMIN SERPL-MCNC: 4.2 G/DL (ref 3.5–5.2)
ALP BLD-CCNC: 58 U/L (ref 35–104)
ALT SERPL-CCNC: 9 U/L (ref 0–32)
ANION GAP SERPL CALCULATED.3IONS-SCNC: 10 MMOL/L (ref 7–16)
ANISOCYTOSIS: ABNORMAL
AST SERPL-CCNC: 14 U/L (ref 0–31)
BASOPHILS ABSOLUTE: 0.03 E9/L (ref 0–0.2)
BASOPHILS RELATIVE PERCENT: 0.3 % (ref 0–2)
BILIRUB SERPL-MCNC: 0.2 MG/DL (ref 0–1.2)
BUN BLDV-MCNC: 16 MG/DL (ref 8–23)
BURR CELLS: ABNORMAL
C-REACTIVE PROTEIN: 0.3 MG/DL (ref 0–0.4)
CALCIUM SERPL-MCNC: 9.4 MG/DL (ref 8.6–10.2)
CHLORIDE BLD-SCNC: 106 MMOL/L (ref 98–107)
CO2: 25 MMOL/L (ref 22–29)
CREAT SERPL-MCNC: 1.1 MG/DL (ref 0.5–1)
EOSINOPHILS ABSOLUTE: 0.04 E9/L (ref 0.05–0.5)
EOSINOPHILS RELATIVE PERCENT: 0.3 % (ref 0–6)
GFR AFRICAN AMERICAN: >60
GFR NON-AFRICAN AMERICAN: >60 ML/MIN/1.73
GLUCOSE BLD-MCNC: 93 MG/DL (ref 74–99)
HCT VFR BLD CALC: 31.2 % (ref 34–48)
HEMOGLOBIN: 9.6 G/DL (ref 11.5–15.5)
HYPOCHROMIA: ABNORMAL
IMMATURE GRANULOCYTES #: 0.11 E9/L
IMMATURE GRANULOCYTES %: 0.9 % (ref 0–5)
LYMPHOCYTES ABSOLUTE: 1.32 E9/L (ref 1.5–4)
LYMPHOCYTES RELATIVE PERCENT: 11.2 % (ref 20–42)
MCH RBC QN AUTO: 25.4 PG (ref 26–35)
MCHC RBC AUTO-ENTMCNC: 30.8 % (ref 32–34.5)
MCV RBC AUTO: 82.5 FL (ref 80–99.9)
MONOCYTES ABSOLUTE: 0.99 E9/L (ref 0.1–0.95)
MONOCYTES RELATIVE PERCENT: 8.4 % (ref 2–12)
NEUTROPHILS ABSOLUTE: 9.27 E9/L (ref 1.8–7.3)
NEUTROPHILS RELATIVE PERCENT: 78.9 % (ref 43–80)
OVALOCYTES: ABNORMAL
PDW BLD-RTO: 20.3 FL (ref 11.5–15)
PLATELET # BLD: 471 E9/L (ref 130–450)
PMV BLD AUTO: 10.3 FL (ref 7–12)
POIKILOCYTES: ABNORMAL
POLYCHROMASIA: ABNORMAL
POTASSIUM SERPL-SCNC: 5.4 MMOL/L (ref 3.5–5)
RBC # BLD: 3.78 E12/L (ref 3.5–5.5)
SCHISTOCYTES: ABNORMAL
SODIUM BLD-SCNC: 141 MMOL/L (ref 132–146)
TARGET CELLS: ABNORMAL
TOTAL PROTEIN: 7.9 G/DL (ref 6.4–8.3)
WBC # BLD: 11.8 E9/L (ref 4.5–11.5)

## 2019-04-25 PROCEDURE — 86140 C-REACTIVE PROTEIN: CPT

## 2019-04-25 PROCEDURE — 85025 COMPLETE CBC W/AUTO DIFF WBC: CPT

## 2019-04-25 PROCEDURE — 85651 RBC SED RATE NONAUTOMATED: CPT

## 2019-04-25 PROCEDURE — 80053 COMPREHEN METABOLIC PANEL: CPT

## 2019-04-26 LAB — SEDIMENTATION RATE, ERYTHROCYTE: 39 MM/HR (ref 0–20)

## 2019-07-25 ENCOUNTER — HOSPITAL ENCOUNTER (OUTPATIENT)
Age: 69
Discharge: HOME | End: 2019-07-27
Payer: COMMERCIAL

## 2019-07-25 PROCEDURE — 36415 COLL VENOUS BLD VENIPUNCTURE: CPT

## 2019-07-25 PROCEDURE — 80053 COMPREHEN METABOLIC PANEL: CPT

## 2019-07-25 PROCEDURE — 85025 COMPLETE CBC W/AUTO DIFF WBC: CPT

## 2019-07-26 LAB
ACANTHOCYTES: ABNORMAL
ALBUMIN SERPL-MCNC: 4 G/DL (ref 3.5–5.2)
ALP BLD-CCNC: 66 U/L (ref 35–104)
ALT SERPL-CCNC: <5 U/L (ref 0–32)
ANION GAP SERPL CALCULATED.3IONS-SCNC: 16 MMOL/L (ref 7–16)
ANISOCYTOSIS: ABNORMAL
AST SERPL-CCNC: 13 U/L (ref 0–31)
BASOPHILS ABSOLUTE: 0.07 E9/L (ref 0–0.2)
BASOPHILS RELATIVE PERCENT: 0.7 % (ref 0–2)
BILIRUB SERPL-MCNC: 0.2 MG/DL (ref 0–1.2)
BUN BLDV-MCNC: 11 MG/DL (ref 8–23)
BURR CELLS: ABNORMAL
CALCIUM SERPL-MCNC: 9.4 MG/DL (ref 8.6–10.2)
CHLORIDE BLD-SCNC: 106 MMOL/L (ref 98–107)
CO2: 22 MMOL/L (ref 22–29)
CREAT SERPL-MCNC: 0.9 MG/DL (ref 0.5–1)
EOSINOPHILS ABSOLUTE: 0.05 E9/L (ref 0.05–0.5)
EOSINOPHILS RELATIVE PERCENT: 0.5 % (ref 0–6)
GFR AFRICAN AMERICAN: >60
GFR NON-AFRICAN AMERICAN: >60 ML/MIN/1.73
GLUCOSE BLD-MCNC: 95 MG/DL (ref 74–99)
HCT VFR BLD CALC: 28.6 % (ref 34–48)
HEMOGLOBIN: 8.4 G/DL (ref 11.5–15.5)
HYPOCHROMIA: ABNORMAL
IMMATURE GRANULOCYTES #: 0.09 E9/L
IMMATURE GRANULOCYTES %: 0.9 % (ref 0–5)
LYMPHOCYTES ABSOLUTE: 1.04 E9/L (ref 1.5–4)
LYMPHOCYTES RELATIVE PERCENT: 10.1 % (ref 20–42)
MCH RBC QN AUTO: 23.9 PG (ref 26–35)
MCHC RBC AUTO-ENTMCNC: 29.4 % (ref 32–34.5)
MCV RBC AUTO: 81.5 FL (ref 80–99.9)
MONOCYTES ABSOLUTE: 0.88 E9/L (ref 0.1–0.95)
MONOCYTES RELATIVE PERCENT: 8.5 % (ref 2–12)
NEUTROPHILS ABSOLUTE: 8.17 E9/L (ref 1.8–7.3)
NEUTROPHILS RELATIVE PERCENT: 79.3 % (ref 43–80)
OVALOCYTES: ABNORMAL
PDW BLD-RTO: 24.9 FL (ref 11.5–15)
PLATELET # BLD: 465 E9/L (ref 130–450)
PMV BLD AUTO: 11.2 FL (ref 7–12)
POIKILOCYTES: ABNORMAL
POLYCHROMASIA: ABNORMAL
POTASSIUM SERPL-SCNC: 4.5 MMOL/L (ref 3.5–5)
RBC # BLD: 3.51 E12/L (ref 3.5–5.5)
SCHISTOCYTES: ABNORMAL
SODIUM BLD-SCNC: 144 MMOL/L (ref 132–146)
TARGET CELLS: ABNORMAL
TOTAL PROTEIN: 7.7 G/DL (ref 6.4–8.3)
WBC # BLD: 10.3 E9/L (ref 4.5–11.5)

## 2019-11-06 ENCOUNTER — HOSPITAL ENCOUNTER (OUTPATIENT)
Dept: PHYSICAL THERAPY | Age: 69
Setting detail: THERAPIES SERIES
Discharge: HOME | End: 2019-11-06
Payer: COMMERCIAL

## 2019-11-06 PROCEDURE — 97162 PT EVAL MOD COMPLEX 30 MIN: CPT

## 2019-11-08 ENCOUNTER — HOSPITAL ENCOUNTER (OUTPATIENT)
Dept: PHYSICAL THERAPY | Age: 69
Setting detail: THERAPIES SERIES
Discharge: HOME | End: 2019-11-08
Payer: COMMERCIAL

## 2019-11-20 ENCOUNTER — HOSPITAL ENCOUNTER (OUTPATIENT)
Dept: PHYSICAL THERAPY | Age: 69
Setting detail: THERAPIES SERIES
Discharge: HOME | End: 2019-11-20
Payer: COMMERCIAL

## 2019-11-20 PROCEDURE — 97530 THERAPEUTIC ACTIVITIES: CPT

## 2019-11-20 PROCEDURE — 97110 THERAPEUTIC EXERCISES: CPT

## 2019-11-22 ENCOUNTER — HOSPITAL ENCOUNTER (OUTPATIENT)
Dept: PHYSICAL THERAPY | Age: 69
Setting detail: THERAPIES SERIES
Discharge: HOME | End: 2019-11-22
Payer: COMMERCIAL

## 2019-12-10 ENCOUNTER — HOSPITAL ENCOUNTER (OUTPATIENT)
Dept: PHYSICAL THERAPY | Age: 69
Setting detail: THERAPIES SERIES
Discharge: HOME | End: 2019-12-10
Payer: COMMERCIAL

## 2019-12-10 PROCEDURE — 97112 NEUROMUSCULAR REEDUCATION: CPT

## 2019-12-10 PROCEDURE — 97530 THERAPEUTIC ACTIVITIES: CPT

## 2019-12-10 PROCEDURE — 97110 THERAPEUTIC EXERCISES: CPT

## 2019-12-13 ENCOUNTER — HOSPITAL ENCOUNTER (OUTPATIENT)
Dept: PHYSICAL THERAPY | Age: 69
Setting detail: THERAPIES SERIES
Discharge: HOME | End: 2019-12-13
Payer: COMMERCIAL

## 2020-01-03 ENCOUNTER — APPOINTMENT (OUTPATIENT)
Dept: GENERAL RADIOLOGY | Age: 70
End: 2020-01-03
Payer: COMMERCIAL

## 2020-01-03 ENCOUNTER — HOSPITAL ENCOUNTER (OUTPATIENT)
Age: 70
Setting detail: OBSERVATION
LOS: 1 days | Discharge: HOME | End: 2020-01-06
Attending: EMERGENCY MEDICINE | Admitting: INTERNAL MEDICINE
Payer: COMMERCIAL

## 2020-01-03 PROBLEM — D64.9 ANEMIA: Status: ACTIVE | Noted: 2020-01-03

## 2020-01-03 LAB
ABO/RH: NORMAL
ALBUMIN SERPL-MCNC: 4.4 G/DL (ref 3.5–5.2)
ALP BLD-CCNC: 67 U/L (ref 35–104)
ALT SERPL-CCNC: 14 U/L (ref 0–32)
ANION GAP SERPL CALCULATED.3IONS-SCNC: 17 MMOL/L (ref 7–16)
ANISOCYTOSIS: ABNORMAL
ANTIBODY SCREEN: NORMAL
APTT: 28.4 SEC (ref 24.5–35.1)
AST SERPL-CCNC: 16 U/L (ref 0–31)
BASOPHILS ABSOLUTE: 0 E9/L (ref 0–0.2)
BASOPHILS RELATIVE PERCENT: 0.2 % (ref 0–2)
BILIRUB SERPL-MCNC: 0.4 MG/DL (ref 0–1.2)
BLOOD BANK DISPENSE STATUS: NORMAL
BLOOD BANK PRODUCT CODE: NORMAL
BPU ID: NORMAL
BUN BLDV-MCNC: 23 MG/DL (ref 8–23)
BURR CELLS: ABNORMAL
CALCIUM SERPL-MCNC: 9.4 MG/DL (ref 8.6–10.2)
CHLORIDE BLD-SCNC: 101 MMOL/L (ref 98–107)
CO2: 19 MMOL/L (ref 22–29)
CREAT SERPL-MCNC: 1.6 MG/DL (ref 0.5–1)
DESCRIPTION BLOOD BANK: NORMAL
EOSINOPHILS ABSOLUTE: 0 E9/L (ref 0.05–0.5)
EOSINOPHILS RELATIVE PERCENT: 0.1 % (ref 0–6)
FERRITIN: 30 NG/ML
GFR AFRICAN AMERICAN: 39
GFR NON-AFRICAN AMERICAN: 39 ML/MIN/1.73
GLUCOSE BLD-MCNC: 127 MG/DL (ref 74–99)
HCT VFR BLD CALC: 21 % (ref 34–48)
HEMOGLOBIN: 6.1 G/DL (ref 11.5–15.5)
HYPOCHROMIA: ABNORMAL
INFLUENZA A BY PCR: NOT DETECTED
INFLUENZA B BY PCR: NOT DETECTED
INR BLD: 1.1
IRON SATURATION: 5 % (ref 15–50)
IRON: 18 MCG/DL (ref 37–145)
LACTATE DEHYDROGENASE: 232 U/L (ref 135–214)
LYMPHOCYTES ABSOLUTE: 0.48 E9/L (ref 1.5–4)
LYMPHOCYTES RELATIVE PERCENT: 1.7 % (ref 20–42)
MCH RBC QN AUTO: 21 PG (ref 26–35)
MCHC RBC AUTO-ENTMCNC: 29 % (ref 32–34.5)
MCV RBC AUTO: 72.4 FL (ref 80–99.9)
MONOCYTES ABSOLUTE: 0.48 E9/L (ref 0.1–0.95)
MONOCYTES RELATIVE PERCENT: 1.7 % (ref 2–12)
NEUTROPHILS ABSOLUTE: 23.47 E9/L (ref 1.8–7.3)
NEUTROPHILS RELATIVE PERCENT: 96.5 % (ref 43–80)
NUCLEATED RED BLOOD CELLS: 0.9 /100 WBC
OVALOCYTES: ABNORMAL
PDW BLD-RTO: 24.7 FL (ref 11.5–15)
PLATELET # BLD: 485 E9/L (ref 130–450)
PMV BLD AUTO: 9.3 FL (ref 7–12)
POIKILOCYTES: ABNORMAL
POLYCHROMASIA: ABNORMAL
POTASSIUM SERPL-SCNC: 4.4 MMOL/L (ref 3.5–5)
PROTHROMBIN TIME: 12.7 SEC (ref 9.3–12.4)
RBC # BLD: 2.9 E12/L (ref 3.5–5.5)
SCHISTOCYTES: ABNORMAL
SODIUM BLD-SCNC: 137 MMOL/L (ref 132–146)
TARGET CELLS: ABNORMAL
TOTAL IRON BINDING CAPACITY: 351 MCG/DL (ref 250–450)
TOTAL PROTEIN: 8.5 G/DL (ref 6.4–8.3)
TROPONIN: <0.01 NG/ML (ref 0–0.03)
WBC # BLD: 24.2 E9/L (ref 4.5–11.5)

## 2020-01-03 PROCEDURE — 86850 RBC ANTIBODY SCREEN: CPT

## 2020-01-03 PROCEDURE — 6370000000 HC RX 637 (ALT 250 FOR IP): Performed by: INTERNAL MEDICINE

## 2020-01-03 PROCEDURE — 83550 IRON BINDING TEST: CPT

## 2020-01-03 PROCEDURE — 86901 BLOOD TYPING SEROLOGIC RH(D): CPT

## 2020-01-03 PROCEDURE — G0378 HOSPITAL OBSERVATION PER HR: HCPCS

## 2020-01-03 PROCEDURE — 84484 ASSAY OF TROPONIN QUANT: CPT

## 2020-01-03 PROCEDURE — 6360000002 HC RX W HCPCS: Performed by: INTERNAL MEDICINE

## 2020-01-03 PROCEDURE — 96374 THER/PROPH/DIAG INJ IV PUSH: CPT

## 2020-01-03 PROCEDURE — 85610 PROTHROMBIN TIME: CPT

## 2020-01-03 PROCEDURE — P9016 RBC LEUKOCYTES REDUCED: HCPCS

## 2020-01-03 PROCEDURE — 87502 INFLUENZA DNA AMP PROBE: CPT

## 2020-01-03 PROCEDURE — 99285 EMERGENCY DEPT VISIT HI MDM: CPT

## 2020-01-03 PROCEDURE — 86923 COMPATIBILITY TEST ELECTRIC: CPT

## 2020-01-03 PROCEDURE — 83615 LACTATE (LD) (LDH) ENZYME: CPT

## 2020-01-03 PROCEDURE — 82728 ASSAY OF FERRITIN: CPT

## 2020-01-03 PROCEDURE — 83540 ASSAY OF IRON: CPT

## 2020-01-03 PROCEDURE — 96375 TX/PRO/DX INJ NEW DRUG ADDON: CPT

## 2020-01-03 PROCEDURE — 80053 COMPREHEN METABOLIC PANEL: CPT

## 2020-01-03 PROCEDURE — 71046 X-RAY EXAM CHEST 2 VIEWS: CPT

## 2020-01-03 PROCEDURE — C9113 INJ PANTOPRAZOLE SODIUM, VIA: HCPCS | Performed by: INTERNAL MEDICINE

## 2020-01-03 PROCEDURE — 2580000003 HC RX 258: Performed by: INTERNAL MEDICINE

## 2020-01-03 PROCEDURE — 87040 BLOOD CULTURE FOR BACTERIA: CPT

## 2020-01-03 PROCEDURE — 85025 COMPLETE CBC W/AUTO DIFF WBC: CPT

## 2020-01-03 PROCEDURE — 6360000002 HC RX W HCPCS

## 2020-01-03 PROCEDURE — 93005 ELECTROCARDIOGRAM TRACING: CPT | Performed by: NURSE PRACTITIONER

## 2020-01-03 PROCEDURE — 36415 COLL VENOUS BLD VENIPUNCTURE: CPT

## 2020-01-03 PROCEDURE — 85730 THROMBOPLASTIN TIME PARTIAL: CPT

## 2020-01-03 PROCEDURE — 86900 BLOOD TYPING SEROLOGIC ABO: CPT

## 2020-01-03 RX ORDER — GABAPENTIN 300 MG/1
300 CAPSULE ORAL 2 TIMES DAILY
COMMUNITY

## 2020-01-03 RX ORDER — 0.9 % SODIUM CHLORIDE 0.9 %
250 INTRAVENOUS SOLUTION INTRAVENOUS ONCE
Status: DISCONTINUED | OUTPATIENT
Start: 2020-01-03 | End: 2020-01-06 | Stop reason: HOSPADM

## 2020-01-03 RX ORDER — METHOCARBAMOL 750 MG/1
750 TABLET, FILM COATED ORAL 3 TIMES DAILY
Status: DISCONTINUED | OUTPATIENT
Start: 2020-01-03 | End: 2020-01-06 | Stop reason: HOSPADM

## 2020-01-03 RX ORDER — PANTOPRAZOLE SODIUM 40 MG/10ML
40 INJECTION, POWDER, LYOPHILIZED, FOR SOLUTION INTRAVENOUS EVERY 12 HOURS
Status: DISCONTINUED | OUTPATIENT
Start: 2020-01-03 | End: 2020-01-06 | Stop reason: HOSPADM

## 2020-01-03 RX ORDER — SODIUM CHLORIDE 9 MG/ML
10 INJECTION INTRAVENOUS EVERY 12 HOURS
Status: DISCONTINUED | OUTPATIENT
Start: 2020-01-03 | End: 2020-01-06 | Stop reason: HOSPADM

## 2020-01-03 RX ORDER — HYDROCODONE BITARTRATE AND ACETAMINOPHEN 7.5; 325 MG/1; MG/1
1 TABLET ORAL EVERY 8 HOURS PRN
Status: DISCONTINUED | OUTPATIENT
Start: 2020-01-03 | End: 2020-01-06 | Stop reason: HOSPADM

## 2020-01-03 RX ORDER — FENTANYL CITRATE 50 UG/ML
50 INJECTION, SOLUTION INTRAMUSCULAR; INTRAVENOUS ONCE
Status: COMPLETED | OUTPATIENT
Start: 2020-01-03 | End: 2020-01-03

## 2020-01-03 RX ORDER — SODIUM CHLORIDE 9 MG/ML
INJECTION, SOLUTION INTRAVENOUS CONTINUOUS
Status: DISCONTINUED | OUTPATIENT
Start: 2020-01-03 | End: 2020-01-05

## 2020-01-03 RX ORDER — SODIUM CHLORIDE 0.9 % (FLUSH) 0.9 %
10 SYRINGE (ML) INJECTION PRN
Status: DISCONTINUED | OUTPATIENT
Start: 2020-01-03 | End: 2020-01-06 | Stop reason: HOSPADM

## 2020-01-03 RX ORDER — SUCRALFATE 1 G/1
1 TABLET ORAL 4 TIMES DAILY
Status: DISCONTINUED | OUTPATIENT
Start: 2020-01-03 | End: 2020-01-06 | Stop reason: HOSPADM

## 2020-01-03 RX ORDER — GABAPENTIN 300 MG/1
300 CAPSULE ORAL 2 TIMES DAILY
Status: DISCONTINUED | OUTPATIENT
Start: 2020-01-03 | End: 2020-01-06 | Stop reason: HOSPADM

## 2020-01-03 RX ORDER — ACETAMINOPHEN 325 MG/1
650 TABLET ORAL EVERY 4 HOURS PRN
Status: DISCONTINUED | OUTPATIENT
Start: 2020-01-03 | End: 2020-01-06 | Stop reason: HOSPADM

## 2020-01-03 RX ORDER — SODIUM CHLORIDE 0.9 % (FLUSH) 0.9 %
10 SYRINGE (ML) INJECTION EVERY 12 HOURS SCHEDULED
Status: DISCONTINUED | OUTPATIENT
Start: 2020-01-03 | End: 2020-01-06 | Stop reason: HOSPADM

## 2020-01-03 RX ORDER — M-VIT,TX,IRON,MINS/CALC/FOLIC 27MG-0.4MG
1 TABLET ORAL DAILY
Status: DISCONTINUED | OUTPATIENT
Start: 2020-01-04 | End: 2020-01-06 | Stop reason: HOSPADM

## 2020-01-03 RX ORDER — PREDNISONE 1 MG/1
5 TABLET ORAL DAILY
Status: DISCONTINUED | OUTPATIENT
Start: 2020-01-03 | End: 2020-01-06 | Stop reason: HOSPADM

## 2020-01-03 RX ORDER — FENTANYL CITRATE 50 UG/ML
INJECTION, SOLUTION INTRAMUSCULAR; INTRAVENOUS
Status: COMPLETED
Start: 2020-01-03 | End: 2020-01-03

## 2020-01-03 RX ORDER — SENNA AND DOCUSATE SODIUM 50; 8.6 MG/1; MG/1
2 TABLET, FILM COATED ORAL NIGHTLY PRN
Status: DISCONTINUED | OUTPATIENT
Start: 2020-01-03 | End: 2020-01-06 | Stop reason: HOSPADM

## 2020-01-03 RX ORDER — SUMATRIPTAN 50 MG/1
50 TABLET, FILM COATED ORAL
Status: ACTIVE | OUTPATIENT
Start: 2020-01-03 | End: 2020-01-03

## 2020-01-03 RX ORDER — ATORVASTATIN CALCIUM 10 MG/1
20 TABLET, FILM COATED ORAL DAILY
Status: DISCONTINUED | OUTPATIENT
Start: 2020-01-03 | End: 2020-01-06 | Stop reason: HOSPADM

## 2020-01-03 RX ORDER — ONDANSETRON 2 MG/ML
4 INJECTION INTRAMUSCULAR; INTRAVENOUS EVERY 6 HOURS PRN
Status: DISCONTINUED | OUTPATIENT
Start: 2020-01-03 | End: 2020-01-06 | Stop reason: HOSPADM

## 2020-01-03 RX ADMIN — Medication 10 ML: at 23:16

## 2020-01-03 RX ADMIN — PANTOPRAZOLE SODIUM 40 MG: 40 INJECTION, POWDER, FOR SOLUTION INTRAVENOUS at 23:10

## 2020-01-03 RX ADMIN — SODIUM CHLORIDE: 9 INJECTION, SOLUTION INTRAVENOUS at 23:18

## 2020-01-03 RX ADMIN — PREDNISONE 5 MG: 5 TABLET ORAL at 23:12

## 2020-01-03 RX ADMIN — SUCRALFATE 1 G: 1 TABLET ORAL at 23:12

## 2020-01-03 RX ADMIN — METHOCARBAMOL TABLETS 750 MG: 750 TABLET, COATED ORAL at 23:11

## 2020-01-03 RX ADMIN — ATORVASTATIN CALCIUM 20 MG: 10 TABLET, FILM COATED ORAL at 23:10

## 2020-01-03 RX ADMIN — GABAPENTIN 300 MG: 300 CAPSULE ORAL at 23:28

## 2020-01-03 RX ADMIN — FENTANYL CITRATE 100 MCG: 50 INJECTION, SOLUTION INTRAMUSCULAR; INTRAVENOUS at 18:10

## 2020-01-03 RX ADMIN — SODIUM CHLORIDE, PRESERVATIVE FREE 10 ML: 5 INJECTION INTRAVENOUS at 23:10

## 2020-01-03 RX ADMIN — HYDROCODONE BITARTRATE AND ACETAMINOPHEN 1 TABLET: 7.5; 325 TABLET ORAL at 23:12

## 2020-01-03 ASSESSMENT — PAIN DESCRIPTION - DESCRIPTORS
DESCRIPTORS: ACHING;DISCOMFORT
DESCRIPTORS: ACHING
DESCRIPTORS: ACHING

## 2020-01-03 ASSESSMENT — PAIN DESCRIPTION - FREQUENCY
FREQUENCY: CONTINUOUS

## 2020-01-03 ASSESSMENT — PAIN SCALES - GENERAL
PAINLEVEL_OUTOF10: 7
PAINLEVEL_OUTOF10: 0
PAINLEVEL_OUTOF10: 8
PAINLEVEL_OUTOF10: 8

## 2020-01-03 ASSESSMENT — PAIN DESCRIPTION - PROGRESSION: CLINICAL_PROGRESSION: GRADUALLY WORSENING

## 2020-01-03 ASSESSMENT — PAIN DESCRIPTION - LOCATION
LOCATION: BACK;SHOULDER
LOCATION: BACK
LOCATION: CHEST

## 2020-01-03 ASSESSMENT — PAIN DESCRIPTION - ORIENTATION
ORIENTATION: LOWER
ORIENTATION: LOWER

## 2020-01-03 NOTE — ED NOTES
Bed: 21  Expected date:   Expected time:   Means of arrival:   Comments:  triage     Marion Allen RN  01/03/20 7820

## 2020-01-03 NOTE — ED PROVIDER NOTES
HPI:  1/3/20, Time: 3:43 PM         Maria A Coffey is a 81 y.o. female presenting to the ED for productive cough and chills. Patient states that she has a chronic cough but that the mucous became green yesterday when coughing, and that she had chest pain with coughing. She also noted that she sounded wheezy. Patient reports dizziness, nausea, lightheadedness, chills, and some transient aching in her legs and hips. She denies vomiting, sinus pressure, diarrhea, numbness, tingling, and blood in her stool or urine. Review of Systems:   Pertinent positives and negatives are stated within HPI, all other systems reviewed and are negative.          --------------------------------------------- PAST HISTORY ---------------------------------------------  Past Medical History:  has a past medical history of Acid reflux, Analgesic rebound headache, Chronic back pain, Constipation, Headache(784.0), History of sinus problem, Hyperlipidemia, Hypertension, Memory loss, short term, Neuropathy, Osteoarthritis, Pain in neck, Rheumatoid arthritis(714.0), Ringing in ears, and TIA (transient ischemic attack). Past Surgical History:  has a past surgical history that includes Colonoscopy (2006); Hysterectomy; cardiovascular stress test (2003); Tubal ligation; Tooth Extraction (Right, October 2014); shoulder surgery (Left); and shoulder surgery (Right). Social History:  reports that she has been smoking cigarettes. She has a 40.00 pack-year smoking history. She has never used smokeless tobacco. She reports that she does not drink alcohol or use drugs. Family History: family history includes Alzheimer's Disease in her mother; Bleeding Prob in her mother; Cancer in her father; High Cholesterol in her mother; Other in her mother. The patients home medications have been reviewed. Allergies: Codeine;  Hydrocodone-acetaminophen; and Ultram [tramadol hcl]    -------------------------------------------------- RESULTS -------------------------------------------------  All laboratory and radiology results have been personally reviewed by myself   LABS:  Results for orders placed or performed during the hospital encounter of 01/03/20   RAPID INFLUENZA A/B ANTIGENS   Result Value Ref Range    Influenza A by PCR Not Detected Not Detected    Influenza B by PCR Not Detected Not Detected   CBC auto differential   Result Value Ref Range    WBC 24.2 (H) 4.5 - 11.5 E9/L    RBC 2.90 (L) 3.50 - 5.50 E12/L    Hemoglobin 6.1 (LL) 11.5 - 15.5 g/dL    Hematocrit 21.0 (L) 34.0 - 48.0 %    MCV 72.4 (L) 80.0 - 99.9 fL    MCH 21.0 (L) 26.0 - 35.0 pg    MCHC 29.0 (L) 32.0 - 34.5 %    RDW 24.7 (H) 11.5 - 15.0 fL    Platelets 940 (H) 852 - 450 E9/L    MPV 9.3 7.0 - 12.0 fL    Neutrophils % 96.5 (H) 43.0 - 80.0 %    Lymphocytes % 1.7 (L) 20.0 - 42.0 %    Monocytes % 1.7 (L) 2.0 - 12.0 %    Eosinophils % 0.1 0.0 - 6.0 %    Basophils % 0.2 0.0 - 2.0 %    Neutrophils Absolute 23.47 (H) 1.80 - 7.30 E9/L    Lymphocytes Absolute 0.48 (L) 1.50 - 4.00 E9/L    Monocytes Absolute 0.48 0.10 - 0.95 E9/L    Eosinophils Absolute 0.00 (L) 0.05 - 0.50 E9/L    Basophils Absolute 0.00 0.00 - 0.20 E9/L    nRBC 0.9 /100 WBC    Anisocytosis 2+     Polychromasia 3+     Hypochromia 3+     Poikilocytes 2+     Schistocytes 1+     Annabel Cells 1+     Ovalocytes 1+     Target Cells 2+    Comprehensive metabolic panel   Result Value Ref Range    Sodium 137 132 - 146 mmol/L    Potassium 4.4 3.5 - 5.0 mmol/L    Chloride 101 98 - 107 mmol/L    CO2 19 (L) 22 - 29 mmol/L    Anion Gap 17 (H) 7 - 16 mmol/L    Glucose 127 (H) 74 - 99 mg/dL    BUN 23 8 - 23 mg/dL    CREATININE 1.6 (H) 0.5 - 1.0 mg/dL    GFR Non-African American 39 >=60 mL/min/1.73    GFR African American 39     Calcium 9.4 8.6 - 10.2 mg/dL    Total Protein 8.5 (H) 6.4 - 8.3 g/dL    Alb 4.4 3.5 - 5.2 g/dL    Total Bilirubin 0.4 0.0 - 1.2 mg/dL    Alkaline Phosphatase 67 35 - 104 U/L    ALT 14 0 - 32 U/L    AST 16 0 - 31 U/L Troponin   Result Value Ref Range    Troponin <0.01 0.00 - 0.03 ng/mL       RADIOLOGY:  Interpreted by Radiologist.  XR CHEST STANDARD (2 VW)    (Results Pending)       ------------------------- NURSING NOTES AND VITALS REVIEWED ---------------------------   The nursing notes within the ED encounter and vital signs as below have been reviewed. BP (!) 104/52   Pulse 102   Temp 97.3 °F (36.3 °C)   Resp 16   Ht 4' 11\" (1.499 m)   Wt 138 lb (62.6 kg)   SpO2 91%   Breastfeeding? No   BMI 27.87 kg/m²   Oxygen Saturation Interpretation: Normal      ---------------------------------------------------PHYSICAL EXAM--------------------------------------      Constitutional/General: Alert and oriented x3, well appearing, non toxic in NAD  Head: Normocephalic and atraumatic  Eyes: PERRL, EOMI  Mouth: Oropharynx clear, handling secretions, no trismus  Pulmonary: Auscultation reveals faint rhonchi on R; no crackles or wheezes noted  Cardiovascular:  Regular rate and rhythm, no murmurs, gallops, or rubs. 2+ distal pulses  Abdomen: Soft, non tender, non distended,   Extremities: Moves all extremities x 4. Warm and well perfused  Skin: warm and dry without rash  Neurologic: GCS 15  Psych: Normal Affect  Rectal exam: occult blood negative      ------------------------------ ED COURSE/MEDICAL DECISION MAKING----------------------  Medications - No data to display      ED COURSE:  ED Course as of    1559 Rectal occult blood negative    [CR]      ED Course User Index  [CR] Bula Killer, DO       Medical Decision Makinyear old female presented with productive cough and chills. CBC revealed WBC of 24.2 with neutrophilia, Hemoglobin of 6.1, Hematocrit of 21.0. CMP revealed Cr 1.6, elevated from 0.9 on 19, and GFR 39, decreased from >60 on 7/25/15. Chest X ray negative for acute pathology. Rapid Influenza A/B was negative. Labs and imaging reviewed, patient will be admitted. Counseling: The emergency provider has spoken with the patient and family member patient and son and discussed todays results, in addition to providing specific details for the plan of care and counseling regarding the diagnosis and prognosis. Questions are answered at this time and they are agreeable with the plan.      --------------------------------- IMPRESSION AND DISPOSITION ---------------------------------    IMPRESSION  1. Anemia, unspecified type        DISPOSITION  Disposition: Admit to telemetry  Patient condition is stable      NOTE: This report was transcribed using voice recognition software.  Every effort was made to ensure accuracy; however, inadvertent computerized transcription errors may be present       Sameera Muñoz MD  01/05/20 2698

## 2020-01-04 PROBLEM — N18.30 STAGE 3 CHRONIC KIDNEY DISEASE (HCC): Status: ACTIVE | Noted: 2020-01-04

## 2020-01-04 LAB
ANION GAP SERPL CALCULATED.3IONS-SCNC: 16 MMOL/L (ref 7–16)
BACTERIA: ABNORMAL /HPF
BILIRUBIN URINE: NEGATIVE
BLOOD BANK DISPENSE STATUS: NORMAL
BLOOD BANK PRODUCT CODE: NORMAL
BLOOD, URINE: ABNORMAL
BPU ID: NORMAL
BUN BLDV-MCNC: 25 MG/DL (ref 8–23)
CALCIUM SERPL-MCNC: 8.4 MG/DL (ref 8.6–10.2)
CHLORIDE BLD-SCNC: 104 MMOL/L (ref 98–107)
CLARITY: CLEAR
CO2: 16 MMOL/L (ref 22–29)
COLOR: YELLOW
CREAT SERPL-MCNC: 1.5 MG/DL (ref 0.5–1)
DESCRIPTION BLOOD BANK: NORMAL
EKG ATRIAL RATE: 96 BPM
EKG P AXIS: 80 DEGREES
EKG P-R INTERVAL: 144 MS
EKG Q-T INTERVAL: 370 MS
EKG QRS DURATION: 72 MS
EKG QTC CALCULATION (BAZETT): 467 MS
EKG R AXIS: 27 DEGREES
EKG T AXIS: 7 DEGREES
EKG VENTRICULAR RATE: 96 BPM
EPITHELIAL CELLS, UA: ABNORMAL /HPF
GFR AFRICAN AMERICAN: 42
GFR NON-AFRICAN AMERICAN: 42 ML/MIN/1.73
GLUCOSE BLD-MCNC: 93 MG/DL (ref 74–99)
GLUCOSE URINE: NEGATIVE MG/DL
HCT VFR BLD CALC: 20.9 % (ref 34–48)
HCT VFR BLD CALC: 28.1 % (ref 34–48)
HEMOGLOBIN: 6.4 G/DL (ref 11.5–15.5)
HEMOGLOBIN: 6.6 G/DL (ref 11.5–15.5)
HEMOGLOBIN: 7.4 G/DL (ref 11.5–15.5)
HEMOGLOBIN: 8.6 G/DL (ref 11.5–15.5)
KETONES, URINE: NEGATIVE MG/DL
LEUKOCYTE ESTERASE, URINE: ABNORMAL
MCH RBC QN AUTO: 23 PG (ref 26–35)
MCHC RBC AUTO-ENTMCNC: 30.6 % (ref 32–34.5)
MCV RBC AUTO: 75.2 FL (ref 80–99.9)
NITRITE, URINE: NEGATIVE
PDW BLD-RTO: 25 FL (ref 11.5–15)
PH UA: 6 (ref 5–9)
PLATELET # BLD: 400 E9/L (ref 130–450)
PMV BLD AUTO: 10.1 FL (ref 7–12)
POTASSIUM REFLEX MAGNESIUM: 4 MMOL/L (ref 3.5–5)
PROTEIN UA: NEGATIVE MG/DL
RBC # BLD: 2.78 E12/L (ref 3.5–5.5)
RBC UA: ABNORMAL /HPF (ref 0–2)
SODIUM BLD-SCNC: 136 MMOL/L (ref 132–146)
SPECIFIC GRAVITY UA: 1.01 (ref 1–1.03)
UROBILINOGEN, URINE: 0.2 E.U./DL
WBC # BLD: 24.5 E9/L (ref 4.5–11.5)
WBC UA: ABNORMAL /HPF (ref 0–5)

## 2020-01-04 PROCEDURE — 6370000000 HC RX 637 (ALT 250 FOR IP): Performed by: SURGERY

## 2020-01-04 PROCEDURE — 36430 TRANSFUSION BLD/BLD COMPNT: CPT

## 2020-01-04 PROCEDURE — 80048 BASIC METABOLIC PNL TOTAL CA: CPT

## 2020-01-04 PROCEDURE — G0378 HOSPITAL OBSERVATION PER HR: HCPCS

## 2020-01-04 PROCEDURE — 6360000002 HC RX W HCPCS: Performed by: INTERNAL MEDICINE

## 2020-01-04 PROCEDURE — 81001 URINALYSIS AUTO W/SCOPE: CPT

## 2020-01-04 PROCEDURE — 96376 TX/PRO/DX INJ SAME DRUG ADON: CPT

## 2020-01-04 PROCEDURE — 6370000000 HC RX 637 (ALT 250 FOR IP): Performed by: INTERNAL MEDICINE

## 2020-01-04 PROCEDURE — 93010 ELECTROCARDIOGRAM REPORT: CPT | Performed by: INTERNAL MEDICINE

## 2020-01-04 PROCEDURE — 2580000003 HC RX 258: Performed by: INTERNAL MEDICINE

## 2020-01-04 PROCEDURE — 85018 HEMOGLOBIN: CPT

## 2020-01-04 PROCEDURE — C9113 INJ PANTOPRAZOLE SODIUM, VIA: HCPCS | Performed by: INTERNAL MEDICINE

## 2020-01-04 PROCEDURE — 85027 COMPLETE CBC AUTOMATED: CPT

## 2020-01-04 PROCEDURE — 85014 HEMATOCRIT: CPT

## 2020-01-04 PROCEDURE — 36415 COLL VENOUS BLD VENIPUNCTURE: CPT

## 2020-01-04 RX ORDER — 0.9 % SODIUM CHLORIDE 0.9 %
250 INTRAVENOUS SOLUTION INTRAVENOUS ONCE
Status: COMPLETED | OUTPATIENT
Start: 2020-01-04 | End: 2020-01-04

## 2020-01-04 RX ADMIN — HYDROCODONE BITARTRATE AND ACETAMINOPHEN 1 TABLET: 7.5; 325 TABLET ORAL at 14:47

## 2020-01-04 RX ADMIN — PANTOPRAZOLE SODIUM 40 MG: 40 INJECTION, POWDER, FOR SOLUTION INTRAVENOUS at 08:49

## 2020-01-04 RX ADMIN — SUCRALFATE 1 G: 1 TABLET ORAL at 14:47

## 2020-01-04 RX ADMIN — SUCRALFATE 1 G: 1 TABLET ORAL at 08:50

## 2020-01-04 RX ADMIN — MULTIPLE VITAMINS W/ MINERALS TAB 1 TABLET: TAB at 08:49

## 2020-01-04 RX ADMIN — POLYETHYLENE GLYCOL 3350, SODIUM SULFATE ANHYDROUS, SODIUM BICARBONATE, SODIUM CHLORIDE, POTASSIUM CHLORIDE 4000 ML: 236; 22.74; 6.74; 5.86; 2.97 POWDER, FOR SOLUTION ORAL at 19:27

## 2020-01-04 RX ADMIN — METHOCARBAMOL TABLETS 750 MG: 750 TABLET, COATED ORAL at 14:47

## 2020-01-04 RX ADMIN — PREDNISONE 5 MG: 5 TABLET ORAL at 08:49

## 2020-01-04 RX ADMIN — BISACODYL 10 MG: 5 TABLET, COATED ORAL at 21:24

## 2020-01-04 RX ADMIN — SUCRALFATE 1 G: 1 TABLET ORAL at 19:25

## 2020-01-04 RX ADMIN — GABAPENTIN 300 MG: 300 CAPSULE ORAL at 21:24

## 2020-01-04 RX ADMIN — HYDROCODONE BITARTRATE AND ACETAMINOPHEN 1 TABLET: 7.5; 325 TABLET ORAL at 07:02

## 2020-01-04 RX ADMIN — ATORVASTATIN CALCIUM 20 MG: 10 TABLET, FILM COATED ORAL at 08:50

## 2020-01-04 RX ADMIN — SODIUM CHLORIDE 250 ML: 9 INJECTION, SOLUTION INTRAVENOUS at 11:15

## 2020-01-04 RX ADMIN — SODIUM CHLORIDE, PRESERVATIVE FREE 10 ML: 5 INJECTION INTRAVENOUS at 21:28

## 2020-01-04 RX ADMIN — SODIUM CHLORIDE: 9 INJECTION, SOLUTION INTRAVENOUS at 08:49

## 2020-01-04 RX ADMIN — METHOCARBAMOL TABLETS 750 MG: 750 TABLET, COATED ORAL at 08:50

## 2020-01-04 RX ADMIN — SODIUM CHLORIDE, PRESERVATIVE FREE 10 ML: 5 INJECTION INTRAVENOUS at 08:49

## 2020-01-04 RX ADMIN — METHOCARBAMOL TABLETS 750 MG: 750 TABLET, COATED ORAL at 21:24

## 2020-01-04 RX ADMIN — GABAPENTIN 300 MG: 300 CAPSULE ORAL at 08:50

## 2020-01-04 RX ADMIN — Medication 10 ML: at 21:25

## 2020-01-04 RX ADMIN — PANTOPRAZOLE SODIUM 40 MG: 40 INJECTION, POWDER, FOR SOLUTION INTRAVENOUS at 21:24

## 2020-01-04 ASSESSMENT — PAIN SCALES - GENERAL
PAINLEVEL_OUTOF10: 9
PAINLEVEL_OUTOF10: 7
PAINLEVEL_OUTOF10: 6
PAINLEVEL_OUTOF10: 0
PAINLEVEL_OUTOF10: 0

## 2020-01-04 ASSESSMENT — PAIN DESCRIPTION - LOCATION: LOCATION: GENERALIZED

## 2020-01-04 ASSESSMENT — PAIN SCALES - WONG BAKER: WONGBAKER_NUMERICALRESPONSE: 0

## 2020-01-04 ASSESSMENT — PAIN DESCRIPTION - PAIN TYPE: TYPE: ACUTE PAIN

## 2020-01-04 NOTE — PLAN OF CARE
Problem: Cardiac:  Goal: Ability to maintain an adequate cardiac output will improve  Description  Ability to maintain an adequate cardiac output will improve  Outcome: Met This Shift  Goal: Ability to maintain adequate ventilation will improve  Description  Ability to maintain adequate ventilation will improve  Outcome: Met This Shift  Goal: Ability to achieve and maintain adequate cardiopulmonary perfusion will improve  Description  Ability to achieve and maintain adequate cardiopulmonary perfusion will improve  Outcome: Met This Shift     Problem:  Activity:  Goal: Fatigue will decrease  Description  Fatigue will decrease  Outcome: Met This Shift  Goal: Ability to tolerate increased activity will improve  Description  Ability to tolerate increased activity will improve  Outcome: Met This Shift

## 2020-01-04 NOTE — H&P
mouth 3 times daily  aspirin 81 MG EC tablet, Take 1 tablet by mouth 2 times daily  atorvastatin (LIPITOR) 20 MG tablet, Take 1 tablet by mouth daily  Multiple Vitamin (MVI, CELEBRATE, CHEWABLE TABLET), Take 1 tablet by mouth daily  predniSONE (DELTASONE) 5 MG tablet, Take 5 mg by mouth daily  methotrexate 2.5 MG tablet, Take 20 mg by mouth every 14 days Indications: takes 8 tablets every 14 days   folic acid (FOLVITE) 1 MG tablet, Take 1 mg by mouth daily. HYDROcodone-acetaminophen (NORCO) 7.5-325 MG per tablet, Take 1 tablet by mouth every 8 hours as needed for Pain. .  Abatacept (ORENCIA) 125 MG/ML SOSY, Inject into the skin  Qkpfjv-IsUvp-PiWgi-Meth-FA-DHA (PRENATE MINI) 18-0.6-0.4-350 MG CAPS, Take 1 capsule by mouth daily  diclofenac (CATAFLAM) 50 MG tablet, Take 1 tablet by mouth 3 times daily as needed (early migraine)  diclofenac sodium 1 % GEL, Apply 2 g topically 4 times daily as needed for Pain  ranitidine (ZANTAC) 150 MG tablet, Take 1 tablet by mouth 2 times daily  senna-docusate (SENOKOT S) 8.6-50 MG per tablet, Take 4 tablets by mouth every evening  sucralfate (CARAFATE) 1 GM/10ML suspension, Take 1 g by mouth 4 times daily  pantoprazole (PROTONIX) 40 MG tablet, Take 1 tablet by mouth every morning (before breakfast)  Prenatal Vit-Fe Fumarate-FA (PRENATAL PLUS) 27-1 MG TABS, Take 1 tablet by mouth daily  HUMIRA PEN 40 MG/0.8ML PNKT, Inject 40 mg into the skin every 14 days    Allergies:    Codeine; Hydrocodone-acetaminophen; and Ultram [tramadol hcl]    Social History:    reports that she has been smoking cigarettes. She has a 40.00 pack-year smoking history. She has never used smokeless tobacco. She reports that she does not drink alcohol or use drugs. Family History:   family history includes Alzheimer's Disease in her mother; Bleeding Prob in her mother; Cancer in her father; High Cholesterol in her mother; Other in her mother.     REVIEW OF SYSTEMS:  As above in the HPI, otherwise negative    PHYSICAL EXAM:    Vitals:  /65   Pulse 83   Temp 98.3 °F (36.8 °C) (Temporal)   Resp 16   Ht 4' 11\" (1.499 m)   Wt 138 lb (62.6 kg)   SpO2 94%   Breastfeeding? No   BMI 27.87 kg/m²     General:   Awake, alert, oriented X 3. No acute distress. HEENT:    Normocephalic, atraumatic. Pupils equal, round, reactive to light. No scleral icterus. No conjunctival injection. Oropharynx clear. No nasal discharge. Pale conjunctiva. Neck:       Supple. No bruits, adenopathy, masses, neck vein distention. Trachea in the midline. Heart:      RRR, no murmurs, gallops, rubs  Lungs:     CTA bilaterally, bilat symmetrical expansion, no wheeze, rales, or rhonchi  Abdomen:   Bowel sounds present, soft, nontender, no masses, no organomegaly, no peritoneal signs  Extremities:  No clubbing, cyanosis, or edema  Skin:         Warm and dry, no open lesions or rash  Neuro:     Cranial nerves 2-12 intact, no focal deficits  Breast:    No masses  Rectal:    deferred  Genitalia:  deferred    LABS:    CBC with Differential:    Lab Results   Component Value Date    WBC 24.5 01/04/2020    RBC 2.78 01/04/2020    HGB 6.6 01/04/2020    HCT 20.9 01/04/2020     01/04/2020    MCV 75.2 01/04/2020    MCH 23.0 01/04/2020    MCHC 30.6 01/04/2020    RDW 25.0 01/04/2020    NRBC 0.9 01/03/2020    SEGSPCT 33 03/06/2014    METASPCT 2 10/17/2013    LYMPHOPCT 1.7 01/03/2020    MONOPCT 1.7 01/03/2020    BASOPCT 0.2 01/03/2020    MONOSABS 0.48 01/03/2020    LYMPHSABS 0.48 01/03/2020    EOSABS 0.00 01/03/2020    BASOSABS 0.00 01/03/2020     CMP:    Lab Results   Component Value Date     01/04/2020    K 4.0 01/04/2020     01/04/2020    CO2 16 01/04/2020    BUN 25 01/04/2020    CREATININE 1.5 01/04/2020    GFRAA 42 01/04/2020    LABGLOM 42 01/04/2020    GLUCOSE 93 01/04/2020    GLUCOSE 83 04/17/2012    PROT 8.5 01/03/2020    LABALBU 4.4 01/03/2020    LABALBU 4.1 04/17/2012    CALCIUM 8.4 01/04/2020    BILITOT 0.4

## 2020-01-04 NOTE — CONSULTS
GENERAL SURGERY  CONSULT NOTE            Date: 1/4/2020        Patient Name: Iban Rendon     YOB: 1950      Age:  81 y.o. Consult by: Dr. Nieves Lara to: Dr. Charlotte Perla  Reason:  Anemia, Iron Deficiency     Chief Complaint     Chief Complaint   Patient presents with    Cough     states she has been coughing up green stuff for the past 2 days     Dizziness     for the past 2-3 weeks           History Obtained From   patient    History of Present Illness   Iban Rendon is a 81 y.o. woman with nicotine dependence, chronic back pain, constipation who presented for a productive cough that 'made her chest hurt' and chills on 1/3/2020. Patients cough started 2 days ago and progressively worsened coughing up green-colored mucous and having chest pain with coughing. She also had some shortness of breath. Prior to her presentation, pt has had a 'clear mucous' cough in the early morning for several years. CXR was negative for an acute pathology. She denied fevers, chills, abdominal pain, syncope, or recent sick contacts. Workup in ED was remarkable for Hb of 6.1 Hct: 21. She has had increased fatigue since July, and sleeps 6-7 hours a night while awakening frequently. She has bowel movements every 2 weeks at times, and her last reported BM was on Thanksgiving. She places glove with vaseline in her rectum when she feels constipated and then is able to have a hard stool. She does not eat often due to discomfort from her chronic back pain and sometimes only has a meal per day. She smokes 1/2 pack cigarettes daily (15 ppd smoking hx) and denies alcohol use. She denies blood in her stool, acid-reflux symptoms, weight loss.      Past Medical History     Past Medical History:   Diagnosis Date    Acid reflux     Analgesic rebound headache 1/8/2019    Chronic back pain     Constipation     Headache(784.0)     History of sinus problem     Hyperlipidemia     Hypertension     Memory loss, short term  Neuropathy     Osteoarthritis     Pain in neck     Rheumatoid arthritis(714.0)     Dr. Inocente Doan in ears     TIA (transient ischemic attack)         Past Surgical History     Past Surgical History:   Procedure Laterality Date    CARDIOVASCULAR STRESS TEST  2003    normal    COLONOSCOPY  2006    Normal.  Re-referred for diagnostic on 9/10    HYSTERECTOMY      SHOULDER SURGERY Left     SHOULDER SURGERY Right     TOOTH EXTRACTION Right October 2014    TUBAL LIGATION      1978        Medications - Prior to Admission and Current Meds     Prior to Admission medications    Medication Sig Start Date End Date Taking? Authorizing Provider   gabapentin (NEURONTIN) 300 MG capsule Take 300 mg by mouth 2 times daily. Yes Historical Provider, MD   SUMAtriptan (IMITREX) 100 MG tablet Take 0.5 tablets by mouth once as needed for Migraine Take 1/2 tablet at headache onset, may repeat 1/2 tab. In 2 hrs. But no more than one tablet in 24 hrs.  Note; May continue under PCP  Once Rx completed 1/8/19 1/3/20 Yes Lynette Tim MD   lisinopril-hydrochlorothiazide (PRINZIDE;ZESTORETIC) 10-12.5 MG per tablet Take 1 tablet by mouth daily   Yes Historical Provider, MD   famotidine (PEPCID) 20 MG tablet Take 20 mg by mouth 2 times daily   Yes Historical Provider, MD   methocarbamol (ROBAXIN) 750 MG tablet Take 750 mg by mouth 3 times daily   Yes Historical Provider, MD   aspirin 81 MG EC tablet Take 1 tablet by mouth 2 times daily 4/16/18  Yes Andrey Negro., MD   atorvastatin (LIPITOR) 20 MG tablet Take 1 tablet by mouth daily 2/5/18  Yes Andrey Negro., MD   Multiple Vitamin (MVI, CELEBRATE, CHEWABLE TABLET) Take 1 tablet by mouth daily 3/14/17  Yes Andrey Saucedo MD   predniSONE (DELTASONE) 5 MG tablet Take 5 mg by mouth daily   Yes Historical Provider, MD   methotrexate 2.5 MG tablet Take 20 mg by mouth every 14 days Indications: takes 8 tablets every 14 days  10/22/10  Yes Historical Provider, MD   folic acid (FOLVITE) 1 MG tablet Take 1 mg by mouth daily. Yes Historical Provider, MD   HYDROcodone-acetaminophen (NORCO) 7.5-325 MG per tablet Take 1 tablet by mouth every 8 hours as needed for Pain. Ramón Car     Historical Provider, MD   Abatacept Jennynevin Mejia) 125 MG/ML SOSY Inject into the skin    Historical Provider, MD   Arlfkp-CdRee-MqIis-Meth-FA-DHA (PRENATE MINI) 18-0.6-0.4-350 MG CAPS Take 1 capsule by mouth daily 3/5/18   Leobardo Kayser., MD   diclofenac (CATAFLAM) 50 MG tablet Take 1 tablet by mouth 3 times daily as needed (early migraine) 2/5/18   Leobardo Kayser., MD   diclofenac sodium 1 % GEL Apply 2 g topically 4 times daily as needed for Pain 2/5/18   Leobardo Kayser., MD   ranitidine (ZANTAC) 150 MG tablet Take 1 tablet by mouth 2 times daily 2/5/18   Leobardo Kayser., MD   senna-docusate (SENOKOT S) 8.6-50 MG per tablet Take 4 tablets by mouth every evening 6/16/17   Leobardo Kayser., MD   sucralfate (CARAFATE) 1 GM/10ML suspension Take 1 g by mouth 4 times daily    Historical Provider, MD   pantoprazole (PROTONIX) 40 MG tablet Take 1 tablet by mouth every morning (before breakfast) 3/26/16   Arnol Júnior, DO   Prenatal Vit-Fe Fumarate-FA (PRENATAL PLUS) 27-1 MG TABS Take 1 tablet by mouth daily 12/18/15   Leobardo Kayser., MD   HUMIRA PEN 40 MG/0.8ML PNKT Inject 40 mg into the skin every 14 days 7/17/15   Historical Provider, MD        0.9 % sodium chloride bolus, Once  0.9 % sodium chloride bolus, Once  0.9 % sodium chloride infusion, Continuous  sodium chloride flush 0.9 % injection 10 mL, 2 times per day  sodium chloride flush 0.9 % injection 10 mL, PRN  acetaminophen (TYLENOL) tablet 650 mg, Q4H PRN  ondansetron (ZOFRAN) injection 4 mg, Q6H PRN  pantoprazole (PROTONIX) injection 40 mg, Q12H    And  sodium chloride (PF) 0.9 % injection 10 mL, Q12H  atorvastatin (LIPITOR) tablet 20 mg, Daily  diclofenac sodium 1 % gel 2 g, 4x Daily PRN  gabapentin (NEURONTIN) capsule 300 mg, BID  HYDROcodone-acetaminophen (NORCO) 7.5-325 MG per tablet 1 tablet, Q8H PRN  methocarbamol (ROBAXIN) tablet 750 mg, TID  therapeutic multivitamin-minerals 1 tablet, Daily  predniSONE (DELTASONE) tablet 5 mg, Daily  sennosides-docusate sodium (SENOKOT-S) 8.6-50 MG tablet 2 tablet, Nightly PRN  sucralfate (CARAFATE) tablet 1 g, 4x Daily        Allergies   Codeine; Hydrocodone-acetaminophen; and Ultram [tramadol hcl]    Social History     Social History     Tobacco History     Smoking Status  Current Every Day Smoker Smoking Frequency  1 pack/day for 40 years (40 pk yrs) Smoking Tobacco Type  Cigarettes    Smokeless Tobacco Use  Never Used          Alcohol History     Alcohol Use Status  No          Drug Use     Drug Use Status  No          Sexual Activity     Sexually Active  Not Asked                Family History     Family History   Problem Relation Age of Onset    High Cholesterol Mother     Alzheimer's Disease Mother     Other Mother         AAA    Bleeding Prob Mother     Cancer Father        Review of Systems   Pertinent ROS listed in HPI, all others negative  Unable to complete (patient is either noncommunicative or has altered mental status)    Physical Exam   /65   Pulse 83   Temp 98.3 °F (36.8 °C) (Temporal)   Resp 16   Ht 4' 11\" (1.499 m)   Wt 138 lb (62.6 kg)   SpO2 94%   Breastfeeding? No   BMI 27.87 kg/m²     GENERAL:  No acute distress. Alert and oriented. HEAD:  Normocephalic, atraumatic. EYES:  No scleral icterus. Conjugate gaze. ENT/NECK:  Trachea midline, mucous membranes moist.   LUNGS:  No cough. Nonlabored breathing    CARDIOVASC: Normal rate, no cyanosis, regular rhythm. ABDOMEN:  Soft, non-distended,non-tender. No guarding / rigidity / rebound. RECTAL:  Skin tag around anal area. FOBT positive  LIMBS:  No deformities, no edema. NEURO:  Face symmetric, moves all extremities  SKIN:  Warm, dry.      Labs    CBC  Recent Labs     01/04/20  0100 20  1105   WBC 24.5*  --   --    HGB 6.4*   < > 7.4*   HCT 20.9*  --   --      --   --     < > = values in this interval not displayed. BMP  Recent Labs     20  0100      K 4.0      CO2 16*   BUN 25*   CREATININE 1.5*   CALCIUM 8.4*     Liver Function  Recent Labs     20  1424   BILITOT 0.4   AST 16   ALT 14   ALKPHOS 67   PROT 8.5*   LABALBU 4.4     No results for input(s): LACTATE in the last 72 hours. Recent Labs     20  1530   INR 1.1       Imaging/Diagnostics Last 24 Hours   Xr Chest Standard (2 Vw)    Result Date: 1/3/2020  Patient MRN:  [id number] : 1950 Age: 81 years Gender: Female Order Date:  1/3/2020 2:30 PM EXAM: XR CHEST (2 VW) COMPARISON: 2016 INDICATION:  dyspnea dyspnea FINDINGS: The heart is normal in size. No focal airspace opacity. There is no pleural effusion. There is no pneumothorax. No free air beneath the diaphragms. No airspace opacities or pleural effusion. Assessment      Hospital Problems           Last Modified POA    * (Principal) Anemia requiring transfusions 1/3/2020 Yes    GERD (gastroesophageal reflux disease) 1/3/2020 Yes    Cervical radiculopathy, chronic 2020 Yes    Hyperlipidemia LDL goal <100 (Chronic) 1/3/2020 Yes    Essential hypertension (Chronic) 1/3/2020 Yes    Stage 3 chronic kidney disease ([address] 2020 Yes          81 y.o. female with anemia and fatigue. Plan     Clear liquid diet, and golightely  2 day course bowel prep  Dual scopes Monday    Plan iscussed with Dr. April Garcia.      Electronically signed by Michelle Martinez MD on 20 at 12:34 PM

## 2020-01-05 PROBLEM — J44.9 COPD (CHRONIC OBSTRUCTIVE PULMONARY DISEASE) (HCC): Status: ACTIVE | Noted: 2020-01-05

## 2020-01-05 PROBLEM — R19.5 OCCULT GI BLEEDING: Status: ACTIVE | Noted: 2020-01-05

## 2020-01-05 PROBLEM — D64.9 SYMPTOMATIC ANEMIA: Status: ACTIVE | Noted: 2020-01-05

## 2020-01-05 PROCEDURE — 94640 AIRWAY INHALATION TREATMENT: CPT

## 2020-01-05 PROCEDURE — 96376 TX/PRO/DX INJ SAME DRUG ADON: CPT

## 2020-01-05 PROCEDURE — 6370000000 HC RX 637 (ALT 250 FOR IP): Performed by: SURGERY

## 2020-01-05 PROCEDURE — 2580000003 HC RX 258: Performed by: INTERNAL MEDICINE

## 2020-01-05 PROCEDURE — 6370000000 HC RX 637 (ALT 250 FOR IP): Performed by: INTERNAL MEDICINE

## 2020-01-05 PROCEDURE — 6360000002 HC RX W HCPCS: Performed by: INTERNAL MEDICINE

## 2020-01-05 PROCEDURE — G0378 HOSPITAL OBSERVATION PER HR: HCPCS

## 2020-01-05 PROCEDURE — 99232 SBSQ HOSP IP/OBS MODERATE 35: CPT | Performed by: SURGERY

## 2020-01-05 PROCEDURE — C9113 INJ PANTOPRAZOLE SODIUM, VIA: HCPCS | Performed by: INTERNAL MEDICINE

## 2020-01-05 RX ORDER — IPRATROPIUM BROMIDE AND ALBUTEROL SULFATE 2.5; .5 MG/3ML; MG/3ML
1 SOLUTION RESPIRATORY (INHALATION)
Status: DISCONTINUED | OUTPATIENT
Start: 2020-01-05 | End: 2020-01-06 | Stop reason: HOSPADM

## 2020-01-05 RX ORDER — GUAIFENESIN/DEXTROMETHORPHAN 100-10MG/5
5 SYRUP ORAL EVERY 4 HOURS PRN
Status: DISCONTINUED | OUTPATIENT
Start: 2020-01-05 | End: 2020-01-06 | Stop reason: HOSPADM

## 2020-01-05 RX ORDER — POLYETHYLENE GLYCOL 3350 17 G/17G
17 POWDER, FOR SOLUTION ORAL DAILY
Status: DISCONTINUED | OUTPATIENT
Start: 2020-01-05 | End: 2020-01-06 | Stop reason: HOSPADM

## 2020-01-05 RX ORDER — BISACODYL 10 MG
10 SUPPOSITORY, RECTAL RECTAL DAILY
Status: DISCONTINUED | OUTPATIENT
Start: 2020-01-05 | End: 2020-01-06 | Stop reason: HOSPADM

## 2020-01-05 RX ORDER — LACTULOSE 10 G/15ML
20 SOLUTION ORAL 3 TIMES DAILY
Status: DISCONTINUED | OUTPATIENT
Start: 2020-01-05 | End: 2020-01-06 | Stop reason: HOSPADM

## 2020-01-05 RX ADMIN — IPRATROPIUM BROMIDE AND ALBUTEROL SULFATE 1 AMPULE: 2.5; .5 SOLUTION RESPIRATORY (INHALATION) at 18:25

## 2020-01-05 RX ADMIN — HYDROCODONE BITARTRATE AND ACETAMINOPHEN 1 TABLET: 7.5; 325 TABLET ORAL at 22:22

## 2020-01-05 RX ADMIN — GUAIFENESIN AND DEXTROMETHORPHAN 5 ML: 100; 10 SYRUP ORAL at 08:46

## 2020-01-05 RX ADMIN — ATORVASTATIN CALCIUM 20 MG: 10 TABLET, FILM COATED ORAL at 08:40

## 2020-01-05 RX ADMIN — Medication 10 ML: at 08:49

## 2020-01-05 RX ADMIN — SODIUM CHLORIDE, PRESERVATIVE FREE 10 ML: 5 INJECTION INTRAVENOUS at 08:40

## 2020-01-05 RX ADMIN — SUCRALFATE 1 G: 1 TABLET ORAL at 12:15

## 2020-01-05 RX ADMIN — IPRATROPIUM BROMIDE AND ALBUTEROL SULFATE 1 AMPULE: 2.5; .5 SOLUTION RESPIRATORY (INHALATION) at 22:24

## 2020-01-05 RX ADMIN — GABAPENTIN 300 MG: 300 CAPSULE ORAL at 08:40

## 2020-01-05 RX ADMIN — METHOCARBAMOL TABLETS 750 MG: 750 TABLET, COATED ORAL at 22:22

## 2020-01-05 RX ADMIN — HYDROCODONE BITARTRATE AND ACETAMINOPHEN 1 TABLET: 7.5; 325 TABLET ORAL at 12:15

## 2020-01-05 RX ADMIN — Medication 10 ML: at 22:25

## 2020-01-05 RX ADMIN — LACTULOSE 20 G: 20 SOLUTION ORAL at 12:16

## 2020-01-05 RX ADMIN — GUAIFENESIN AND DEXTROMETHORPHAN 5 ML: 100; 10 SYRUP ORAL at 17:51

## 2020-01-05 RX ADMIN — METHOCARBAMOL TABLETS 750 MG: 750 TABLET, COATED ORAL at 14:13

## 2020-01-05 RX ADMIN — METHOCARBAMOL TABLETS 750 MG: 750 TABLET, COATED ORAL at 08:39

## 2020-01-05 RX ADMIN — LACTULOSE 20 G: 20 SOLUTION ORAL at 14:14

## 2020-01-05 RX ADMIN — SUCRALFATE 1 G: 1 TABLET ORAL at 01:46

## 2020-01-05 RX ADMIN — MAGNESIUM SULFATE 1 ENEMA: 1 CRYSTAL ORAL; TOPICAL at 17:52

## 2020-01-05 RX ADMIN — PANTOPRAZOLE SODIUM 40 MG: 40 INJECTION, POWDER, FOR SOLUTION INTRAVENOUS at 22:23

## 2020-01-05 RX ADMIN — POLYETHYLENE GLYCOL 3350 17 G: 17 POWDER, FOR SOLUTION ORAL at 12:15

## 2020-01-05 RX ADMIN — BISACODYL 10 MG: 5 TABLET, COATED ORAL at 22:23

## 2020-01-05 RX ADMIN — PREDNISONE 5 MG: 5 TABLET ORAL at 08:40

## 2020-01-05 RX ADMIN — HYDROCODONE BITARTRATE AND ACETAMINOPHEN 1 TABLET: 7.5; 325 TABLET ORAL at 01:46

## 2020-01-05 RX ADMIN — PANTOPRAZOLE SODIUM 40 MG: 40 INJECTION, POWDER, FOR SOLUTION INTRAVENOUS at 08:40

## 2020-01-05 RX ADMIN — MULTIPLE VITAMINS W/ MINERALS TAB 1 TABLET: TAB at 08:40

## 2020-01-05 RX ADMIN — BISACODYL 10 MG: 10 SUPPOSITORY RECTAL at 12:15

## 2020-01-05 RX ADMIN — SODIUM CHLORIDE, PRESERVATIVE FREE 10 ML: 5 INJECTION INTRAVENOUS at 22:24

## 2020-01-05 RX ADMIN — BISACODYL 10 MG: 5 TABLET, COATED ORAL at 08:40

## 2020-01-05 RX ADMIN — IPRATROPIUM BROMIDE AND ALBUTEROL SULFATE 1 AMPULE: 2.5; .5 SOLUTION RESPIRATORY (INHALATION) at 13:31

## 2020-01-05 RX ADMIN — GABAPENTIN 300 MG: 300 CAPSULE ORAL at 22:23

## 2020-01-05 ASSESSMENT — PAIN SCALES - GENERAL
PAINLEVEL_OUTOF10: 4
PAINLEVEL_OUTOF10: 8
PAINLEVEL_OUTOF10: 1
PAINLEVEL_OUTOF10: 7
PAINLEVEL_OUTOF10: 8
PAINLEVEL_OUTOF10: 0
PAINLEVEL_OUTOF10: 8

## 2020-01-05 ASSESSMENT — PAIN DESCRIPTION - FREQUENCY: FREQUENCY: CONTINUOUS

## 2020-01-05 ASSESSMENT — PAIN DESCRIPTION - DESCRIPTORS: DESCRIPTORS: ACHING;DISCOMFORT

## 2020-01-05 ASSESSMENT — PAIN DESCRIPTION - ONSET: ONSET: ON-GOING

## 2020-01-05 ASSESSMENT — PAIN DESCRIPTION - PROGRESSION: CLINICAL_PROGRESSION: NOT CHANGED

## 2020-01-05 ASSESSMENT — PAIN DESCRIPTION - LOCATION: LOCATION: BACK;SHOULDER

## 2020-01-05 ASSESSMENT — PAIN DESCRIPTION - ORIENTATION: ORIENTATION: RIGHT;LEFT

## 2020-01-05 NOTE — PLAN OF CARE
Problem:  Activity:  Goal: Fatigue will decrease  Description  Fatigue will decrease  Outcome: Met This Shift  Goal: Ability to tolerate increased activity will improve  Description  Ability to tolerate increased activity will improve  Outcome: Met This Shift

## 2020-01-06 VITALS
BODY MASS INDEX: 27.82 KG/M2 | OXYGEN SATURATION: 91 % | HEART RATE: 75 BPM | TEMPERATURE: 98.1 F | SYSTOLIC BLOOD PRESSURE: 145 MMHG | WEIGHT: 138 LBS | HEIGHT: 59 IN | RESPIRATION RATE: 18 BRPM | DIASTOLIC BLOOD PRESSURE: 65 MMHG

## 2020-01-06 PROBLEM — D72.829 LEUKOCYTOSIS: Status: ACTIVE | Noted: 2020-01-06

## 2020-01-06 PROBLEM — Z72.0 TOBACCO ABUSE: Status: ACTIVE | Noted: 2020-01-06

## 2020-01-06 LAB
ACANTHOCYTES: ABNORMAL
ANION GAP SERPL CALCULATED.3IONS-SCNC: 18 MMOL/L (ref 7–16)
ANISOCYTOSIS: ABNORMAL
BASOPHILS ABSOLUTE: 0 E9/L (ref 0–0.2)
BASOPHILS RELATIVE PERCENT: 0.3 % (ref 0–2)
BUN BLDV-MCNC: 6 MG/DL (ref 8–23)
BURR CELLS: ABNORMAL
CALCIUM SERPL-MCNC: 8.6 MG/DL (ref 8.6–10.2)
CHLORIDE BLD-SCNC: 108 MMOL/L (ref 98–107)
CO2: 13 MMOL/L (ref 22–29)
CREAT SERPL-MCNC: 1 MG/DL (ref 0.5–1)
EOSINOPHILS ABSOLUTE: 0 E9/L (ref 0.05–0.5)
EOSINOPHILS RELATIVE PERCENT: 0.4 % (ref 0–6)
GFR AFRICAN AMERICAN: >60
GFR NON-AFRICAN AMERICAN: >60 ML/MIN/1.73
GLUCOSE BLD-MCNC: 95 MG/DL (ref 74–99)
HCT VFR BLD CALC: 28.3 % (ref 34–48)
HEMOGLOBIN: 8.4 G/DL (ref 11.5–15.5)
HYPOCHROMIA: ABNORMAL
LYMPHOCYTES ABSOLUTE: 1.37 E9/L (ref 1.5–4)
LYMPHOCYTES RELATIVE PERCENT: 7 % (ref 20–42)
MCH RBC QN AUTO: 24.1 PG (ref 26–35)
MCHC RBC AUTO-ENTMCNC: 29.7 % (ref 32–34.5)
MCV RBC AUTO: 81.1 FL (ref 80–99.9)
MONOCYTES ABSOLUTE: 2.55 E9/L (ref 0.1–0.95)
MONOCYTES RELATIVE PERCENT: 13 % (ref 2–12)
NEUTROPHILS ABSOLUTE: 15.68 E9/L (ref 1.8–7.3)
NEUTROPHILS RELATIVE PERCENT: 80 % (ref 43–80)
NUCLEATED RED BLOOD CELLS: 1.7 /100 WBC
OVALOCYTES: ABNORMAL
PDW BLD-RTO: 25.4 FL (ref 11.5–15)
PLATELET # BLD: 384 E9/L (ref 130–450)
PMV BLD AUTO: 10.8 FL (ref 7–12)
POIKILOCYTES: ABNORMAL
POLYCHROMASIA: ABNORMAL
POTASSIUM SERPL-SCNC: 3.6 MMOL/L (ref 3.5–5)
RBC # BLD: 3.49 E12/L (ref 3.5–5.5)
SCHISTOCYTES: ABNORMAL
SODIUM BLD-SCNC: 139 MMOL/L (ref 132–146)
TARGET CELLS: ABNORMAL
WBC # BLD: 19.6 E9/L (ref 4.5–11.5)

## 2020-01-06 PROCEDURE — 6370000000 HC RX 637 (ALT 250 FOR IP): Performed by: INTERNAL MEDICINE

## 2020-01-06 PROCEDURE — 6370000000 HC RX 637 (ALT 250 FOR IP): Performed by: SURGERY

## 2020-01-06 PROCEDURE — G0378 HOSPITAL OBSERVATION PER HR: HCPCS

## 2020-01-06 PROCEDURE — 99231 SBSQ HOSP IP/OBS SF/LOW 25: CPT | Performed by: SURGERY

## 2020-01-06 PROCEDURE — 2580000003 HC RX 258: Performed by: INTERNAL MEDICINE

## 2020-01-06 PROCEDURE — 80048 BASIC METABOLIC PNL TOTAL CA: CPT

## 2020-01-06 PROCEDURE — 94640 AIRWAY INHALATION TREATMENT: CPT

## 2020-01-06 PROCEDURE — 2700000000 HC OXYGEN THERAPY PER DAY

## 2020-01-06 PROCEDURE — 94669 MECHANICAL CHEST WALL OSCILL: CPT

## 2020-01-06 PROCEDURE — 6360000002 HC RX W HCPCS: Performed by: INTERNAL MEDICINE

## 2020-01-06 PROCEDURE — 36415 COLL VENOUS BLD VENIPUNCTURE: CPT

## 2020-01-06 PROCEDURE — 85025 COMPLETE CBC W/AUTO DIFF WBC: CPT

## 2020-01-06 PROCEDURE — C9113 INJ PANTOPRAZOLE SODIUM, VIA: HCPCS | Performed by: INTERNAL MEDICINE

## 2020-01-06 PROCEDURE — 96376 TX/PRO/DX INJ SAME DRUG ADON: CPT

## 2020-01-06 RX ORDER — POLYETHYLENE GLYCOL 3350 17 G/17G
17 POWDER, FOR SOLUTION ORAL DAILY
Qty: 527 G | Refills: 1 | Status: SHIPPED | OUTPATIENT
Start: 2020-01-07 | End: 2020-02-06

## 2020-01-06 RX ORDER — GUAIFENESIN/DEXTROMETHORPHAN 100-10MG/5
5 SYRUP ORAL EVERY 4 HOURS PRN
Qty: 120 ML | Refills: 1 | Status: SHIPPED | OUTPATIENT
Start: 2020-01-06 | End: 2020-01-16

## 2020-01-06 RX ADMIN — LACTULOSE 20 G: 20 SOLUTION ORAL at 00:10

## 2020-01-06 RX ADMIN — PANTOPRAZOLE SODIUM 40 MG: 40 INJECTION, POWDER, FOR SOLUTION INTRAVENOUS at 09:07

## 2020-01-06 RX ADMIN — PREDNISONE 5 MG: 5 TABLET ORAL at 09:07

## 2020-01-06 RX ADMIN — Medication 10 ML: at 09:07

## 2020-01-06 RX ADMIN — ATORVASTATIN CALCIUM 20 MG: 10 TABLET, FILM COATED ORAL at 09:07

## 2020-01-06 RX ADMIN — IPRATROPIUM BROMIDE AND ALBUTEROL SULFATE 1 AMPULE: 2.5; .5 SOLUTION RESPIRATORY (INHALATION) at 08:27

## 2020-01-06 RX ADMIN — METHOCARBAMOL TABLETS 750 MG: 750 TABLET, COATED ORAL at 10:46

## 2020-01-06 RX ADMIN — GUAIFENESIN AND DEXTROMETHORPHAN 10 ML: 100; 10 SYRUP ORAL at 09:07

## 2020-01-06 RX ADMIN — MULTIPLE VITAMINS W/ MINERALS TAB 1 TABLET: TAB at 09:07

## 2020-01-06 RX ADMIN — IPRATROPIUM BROMIDE AND ALBUTEROL SULFATE 1 AMPULE: 2.5; .5 SOLUTION RESPIRATORY (INHALATION) at 11:37

## 2020-01-06 RX ADMIN — GABAPENTIN 300 MG: 300 CAPSULE ORAL at 09:08

## 2020-01-06 RX ADMIN — SODIUM CHLORIDE, PRESERVATIVE FREE 10 ML: 5 INJECTION INTRAVENOUS at 09:07

## 2020-01-06 RX ADMIN — HYDROCODONE BITARTRATE AND ACETAMINOPHEN 1 TABLET: 7.5; 325 TABLET ORAL at 06:07

## 2020-01-06 ASSESSMENT — PAIN DESCRIPTION - ORIENTATION: ORIENTATION: MID

## 2020-01-06 ASSESSMENT — PAIN DESCRIPTION - PAIN TYPE: TYPE: CHRONIC PAIN

## 2020-01-06 ASSESSMENT — PAIN DESCRIPTION - FREQUENCY: FREQUENCY: INTERMITTENT

## 2020-01-06 ASSESSMENT — PAIN DESCRIPTION - ONSET: ONSET: GRADUAL

## 2020-01-06 ASSESSMENT — PAIN DESCRIPTION - LOCATION: LOCATION: BACK

## 2020-01-06 ASSESSMENT — PAIN DESCRIPTION - DESCRIPTORS: DESCRIPTORS: ACHING;CONSTANT;DISCOMFORT

## 2020-01-06 ASSESSMENT — PAIN SCALES - GENERAL: PAINLEVEL_OUTOF10: 8

## 2020-01-06 NOTE — PROGRESS NOTES
Dr. Rosalind Block notified of Hgb 6.6 through PS.
[facility]  PROGRESS NOTE  ATTENDING NOTE    Patient presented d/t cough and found to have hgb of 6. She is unable to start bowel prep due to nausea. She has a very difficult time with constipation too. Advised patient may take constipation meds over several days and will complete scopes later this week. /70   Pulse 72   Temp 98.2 °F (36.8 °C) (Temporal)   Resp 16   Ht 4' 11\" (1.499 m)   Wt 138 lb (62.6 kg)   SpO2 96%   Breastfeeding? No   BMI 27.87 kg/m²   Gen:  NAD  Abd;  Soft, Mild distention, NT    ASSESSMENT/PLAN:  Anemia d/t unknown source  --plan for EGD and colonoscopy once patient can complete prep.     Manish Hutchins MD, MSc, FACS  1/5/2020  1:27 PM
General Surgery Progress Note:    CC: anemia    S: doing well + bm no n/v no bleeding       Objective:  @BP (!) 145/65   Pulse 75   Temp 98.1 °F (36.7 °C) (Temporal)   Resp 18   Ht 4' 11\" (1.499 m)   Wt 138 lb (62.6 kg)   SpO2 91%   Breastfeeding? No   BMI 27.87 kg/m² @    Physical -     Gen: NAD  Resp: Breathing Comfortably, no resp distress  CV: Reg Rate  Abd: SNT  EXT NVI       Assessment/Plan: anemia     - at this point, since she has a high stool burden I think it's reasonable to d/c and have her FU for out pt endoscopy, discussed this with Dr Nisha Bar who saw her yesterday. - She should be on  PUD diet and PPI =/- carfate, and can follow up shortly in office for endoscopy planning.         Nury Rodriguez MD FACS     9:49 AM
Patient requesting medication for cough. Message left with Dr Danielle Escobar. Patient also states that she takes aspirin daily at home and wants to take it while in the hospital. This nurse explained rationale for not currently taking aspirin during hospital admission and patient states that she still would like to take it. Message left with Dr Danielle Escobar with patient concerns and request. This nurse will continue to monitor.
Rheumatology consult placed to Dr. Lex Oquendo via [employer]. He is not on this weekend but stated to ensure the patient is to follow up with him as outpt.    Cindy Patel RN
- glycerin - water  1 enema Rectal Once    polyethylene glycol  17 g Oral Daily    bisacodyl  10 mg Oral BID    sodium chloride  250 mL Intravenous Once    sodium chloride flush  10 mL Intravenous 2 times per day    pantoprazole  40 mg Intravenous Q12H    And    sodium chloride (PF)  10 mL Intravenous Q12H    atorvastatin  20 mg Oral Daily    gabapentin  300 mg Oral BID    methocarbamol  750 mg Oral TID    therapeutic multivitamin-minerals  1 tablet Oral Daily    predniSONE  5 mg Oral Daily    sucralfate  1 g Oral 4x Daily     Continuous Infusions:   sodium chloride 75 mL/hr at 01/04/20 0900     PRN Meds:.guaiFENesin-dextromethorphan, sodium chloride flush, acetaminophen, ondansetron, diclofenac sodium, HYDROcodone-acetaminophen, sennosides-docusate sodium    I/O last 3 completed shifts:   In: 4842 [I.V.:916; Blood:450]  Out: 950 [Urine:950]  I/O this shift:  In: 2523 [I.V.:2523]  Out: -     Intake/Output Summary (Last 24 hours) at 1/5/2020 1002  Last data filed at 1/5/2020 0715  Gross per 24 hour   Intake 2973 ml   Output 950 ml   Net 2023 ml       Assessment:    Active Hospital Problems    Diagnosis    Occult GI bleeding [R19.5]    [facility] obstructive [facility]) [facility] [J44.9]    Stage 3 chronic kidney disease ([facility] [N18.3]    Anemia requiring transfusions [D64.9]    Hyperlipidemia LDL goal <100 [E78.5]    Essential hypertension [I10]    Cervical radiculopathy, chronic [M54.12]    GERD (gastroesophageal reflux disease) [K21.9]       Plan:  Repeat stool OB positive            Surgical consult reviewed            Colonoscopy following prep--poorly tolerated            Hemogram reviewed            Rheum f/u as OP            Decline nicotine patch            Duonebs aerosols             Andrew Vila DO  10:02 AM  1/5/2020
1/6/2020 at 10:52 AM

## 2020-01-06 NOTE — DISCHARGE SUMMARY
Physician Discharge Summary     Patient ID:  Keerthi Yan  [id number]  81 y.o.  1950    Admit date: 1/3/2020    Discharge date and time:  1/6/2020    Discharge Diagnoses: Principal Problem:    Anemia requiring transfusions  Active Problems:    GERD (gastroesophageal reflux disease)    Cervical radiculopathy, chronic    Hyperlipidemia LDL goal <100    Essential hypertension    Stage 3 chronic kidney disease (HCC)    Occult GI bleeding    COPD (chronic obstructive pulmonary disease) (HCC)    Symptomatic anemia    Tobacco abuse    Leukocytosis  Resolved Problems:    * No resolved hospital problems. *      Consults: IP CONSULT TO HOSPITALIST  IP CONSULT TO RHEUMATOLOGY  IP CONSULT TO GENERAL SURGERY    Procedures: N/A    Hospital Course: Admitted for anemia status post transfusion in the ED. leukocytosis of uncertain etiology no evidence of infection, trending down  Hemoglobin stable  Evaluated by surgery no plan for inpatient endoscopy recommend outpatient endoscopy  Blood culture no growth to date  Influenza negative  UA small leuks patient denies symptoms of UTI  Chest x-ray NAD  General surgery consult appreciated  Plan for discharge and follow-up as outpatient for colonoscopy     Discharge Exam:  See progress note from today    Condition:  Stable    Disposition: home    Patient Instructions:   Current Discharge Medication List      START taking these medications    Details   polyethylene glycol (GLYCOLAX) packet Take 17 g by mouth daily  Qty: 527 g, Refills: 1      bisacodyl (DULCOLAX) 5 MG EC tablet Take 2 tablets by mouth daily as needed for Constipation  Qty: 30 tablet, Refills: 2      guaiFENesin-dextromethorphan (ROBITUSSIN DM) 100-10 MG/5ML syrup Take 5 mLs by mouth every 4 hours as needed for Cough  Qty: 120 mL, Refills: 1         CONTINUE these medications which have NOT CHANGED    Details   gabapentin (NEURONTIN) 300 MG capsule Take 300 mg by mouth 2 times daily.       SUMAtriptan (IMITREX) 100 MG pantoprazole (PROTONIX) 40 MG tablet Take 1 tablet by mouth every morning (before breakfast)  Qty: 60 tablet, Refills: 0      Prenatal Vit-Fe Fumarate-FA (PRENATAL PLUS) 27-1 MG TABS Take 1 tablet by mouth daily  Qty: 30 tablet, Refills: 5    Associated Diagnoses: Migraine without aura and without status migrainosus, not intractable; Displacement of cervical intervertebral disc without myelopathy      HUMIRA PEN 40 MG/0.8ML PNKT Inject 40 mg into the skin every 14 days         STOP taking these medications       gabapentin (NEURONTIN) 600 MG tablet Comments:   Reason for Stopping:         aspirin 81 MG EC tablet Comments:   Reason for Stopping:         hydrochlorothiazide (HYDRODIURIL) 25 MG tablet Comments:   Reason for Stopping:         diclofenac (CATAFLAM) 50 MG tablet Comments:   Reason for Stopping:         SUMAtriptan (IMITREX) 100 MG tablet Comments:   Reason for Stopping:             Activity: activity as tolerated  Diet: regular diet    Follow-up with 1 week PCP    Signed:  Char Shook MD  1/6/2020  5:42 PM

## 2020-01-08 ENCOUNTER — HOSPITAL ENCOUNTER (OUTPATIENT)
Dept: PHYSICAL THERAPY | Age: 70
Setting detail: THERAPIES SERIES
Discharge: HOME | End: 2020-01-08
Payer: COMMERCIAL

## 2020-01-08 LAB — BLOOD CULTURE, ROUTINE: NORMAL

## 2020-01-08 NOTE — DISCHARGE SUMMARY
[place] [address] [address]   Phone: [phone] Fax: [phone]      Outpatient Physical Therapy  Non compliance/Attendance Issue          Date:  1/8/2020    REFERRING PHYSICIAN:  Dr. Sudhir Alaniz  DIAGNOSIS:  B rotator cuff tendonitis  PHYSICAL THERAPIST:  Dustin Portillo, PT, DPT     Total visits to date:  3  Cancels/No Shows to date:  3 cancellations, 1 no show      Dear Dr. Prachi Lincoln,      This is to inform you that, as per [facility] Physical Therapy department policy, your patient Tin Collado is, as of todays date, being discharged from Physical Therapy secondary to the following reason(s):    Pt was last seen in this clinic on 12/10/2019. Pt cancelled her next scheduled follow-up appointment on 12/13/2019 and has not contacted this office to resume PT as of today's date. If you have any questions, please feel free to call at [phone].       Thank you,    Dustin Portillo PT, DPT    [address] [place] [address]   [phone]

## 2020-01-13 ENCOUNTER — HOSPITAL ENCOUNTER (OUTPATIENT)
Age: 70
Discharge: HOME | End: 2020-01-15
Payer: COMMERCIAL

## 2020-01-13 LAB
ALBUMIN SERPL-MCNC: 3.7 G/DL (ref 3.5–5.2)
ALP BLD-CCNC: 80 U/L (ref 35–104)
ALT SERPL-CCNC: 32 U/L (ref 0–32)
ANION GAP SERPL CALCULATED.3IONS-SCNC: 19 MMOL/L (ref 7–16)
ANISOCYTOSIS: ABNORMAL
AST SERPL-CCNC: 31 U/L (ref 0–31)
BASOPHILS ABSOLUTE: 0.05 E9/L (ref 0–0.2)
BASOPHILS RELATIVE PERCENT: 0.5 % (ref 0–2)
BILIRUB SERPL-MCNC: 0.3 MG/DL (ref 0–1.2)
BUN BLDV-MCNC: 12 MG/DL (ref 8–23)
C-REACTIVE PROTEIN: 1.9 MG/DL (ref 0–0.4)
CALCIUM SERPL-MCNC: 9.6 MG/DL (ref 8.6–10.2)
CHLORIDE BLD-SCNC: 103 MMOL/L (ref 98–107)
CO2: 19 MMOL/L (ref 22–29)
CREAT SERPL-MCNC: 1 MG/DL (ref 0.5–1)
EOSINOPHILS ABSOLUTE: 0.14 E9/L (ref 0.05–0.5)
EOSINOPHILS RELATIVE PERCENT: 1.4 % (ref 0–6)
FERRITIN: 210 NG/ML
GFR AFRICAN AMERICAN: >60
GFR NON-AFRICAN AMERICAN: >60 ML/MIN/1.73
GLUCOSE BLD-MCNC: 84 MG/DL (ref 74–99)
HCT VFR BLD CALC: 30.2 % (ref 34–48)
HEMOGLOBIN: 9 G/DL (ref 11.5–15.5)
HYPOCHROMIA: ABNORMAL
IMMATURE GRANULOCYTES #: 0.16 E9/L
IMMATURE GRANULOCYTES %: 1.5 % (ref 0–5)
LYMPHOCYTES ABSOLUTE: 2.09 E9/L (ref 1.5–4)
LYMPHOCYTES RELATIVE PERCENT: 20.2 % (ref 20–42)
MCH RBC QN AUTO: 23.9 PG (ref 26–35)
MCHC RBC AUTO-ENTMCNC: 29.8 % (ref 32–34.5)
MCV RBC AUTO: 80.1 FL (ref 80–99.9)
MONOCYTES ABSOLUTE: 1.45 E9/L (ref 0.1–0.95)
MONOCYTES RELATIVE PERCENT: 14 % (ref 2–12)
NEUTROPHILS ABSOLUTE: 6.45 E9/L (ref 1.8–7.3)
NEUTROPHILS RELATIVE PERCENT: 62.4 % (ref 43–80)
PDW BLD-RTO: 28.4 FL (ref 11.5–15)
PLATELET # BLD: 858 E9/L (ref 130–450)
PMV BLD AUTO: 9.6 FL (ref 7–12)
POIKILOCYTES: ABNORMAL
POLYCHROMASIA: ABNORMAL
POTASSIUM SERPL-SCNC: 3.9 MMOL/L (ref 3.5–5)
RBC # BLD: 3.77 E12/L (ref 3.5–5.5)
SCHISTOCYTES: ABNORMAL
SODIUM BLD-SCNC: 141 MMOL/L (ref 132–146)
TARGET CELLS: ABNORMAL
TOTAL PROTEIN: 7.7 G/DL (ref 6.4–8.3)
WBC # BLD: 10.3 E9/L (ref 4.5–11.5)

## 2020-01-13 PROCEDURE — 86140 C-REACTIVE PROTEIN: CPT

## 2020-01-13 PROCEDURE — 85025 COMPLETE CBC W/AUTO DIFF WBC: CPT

## 2020-01-13 PROCEDURE — 85045 AUTOMATED RETICULOCYTE COUNT: CPT

## 2020-01-13 PROCEDURE — 82728 ASSAY OF FERRITIN: CPT

## 2020-01-13 PROCEDURE — 36415 COLL VENOUS BLD VENIPUNCTURE: CPT

## 2020-01-13 PROCEDURE — 80053 COMPREHEN METABOLIC PANEL: CPT

## 2020-01-14 LAB
IMMATURE RETIC FRACT: 43.2 % (ref 3–15.9)
RETIC HGB EQUIVALENT: 29 PG (ref 28.2–36.6)
RETICULOCYTE ABSOLUTE COUNT: 0.08 E12/L
RETICULOCYTE COUNT PCT: 2.2 % (ref 0.4–1.9)

## 2020-03-16 ENCOUNTER — HOSPITAL ENCOUNTER (OUTPATIENT)
Age: 70
Discharge: HOME | End: 2020-03-16
Payer: COMMERCIAL

## 2020-03-16 LAB
BASOPHILS ABSOLUTE: 0.08 E9/L (ref 0–0.2)
BASOPHILS RELATIVE PERCENT: 0.7 % (ref 0–2)
EOSINOPHILS ABSOLUTE: 0.12 E9/L (ref 0.05–0.5)
EOSINOPHILS RELATIVE PERCENT: 1.1 % (ref 0–6)
FERRITIN: 55 NG/ML
HCT VFR BLD CALC: 32.4 % (ref 34–48)
HEMOGLOBIN: 9.6 G/DL (ref 11.5–15.5)
IMMATURE GRANULOCYTES #: 0.05 E9/L
IMMATURE GRANULOCYTES %: 0.4 % (ref 0–5)
IMMATURE RETIC FRACT: 29.6 % (ref 3–15.9)
IRON SATURATION: 47 % (ref 15–50)
IRON: 149 MCG/DL (ref 37–145)
LYMPHOCYTES ABSOLUTE: 4.46 E9/L (ref 1.5–4)
LYMPHOCYTES RELATIVE PERCENT: 39.6 % (ref 20–42)
MCH RBC QN AUTO: 24 PG (ref 26–35)
MCHC RBC AUTO-ENTMCNC: 29.6 % (ref 32–34.5)
MCV RBC AUTO: 81 FL (ref 80–99.9)
MONOCYTES ABSOLUTE: 1.34 E9/L (ref 0.1–0.95)
MONOCYTES RELATIVE PERCENT: 11.9 % (ref 2–12)
NEUTROPHILS ABSOLUTE: 5.21 E9/L (ref 1.8–7.3)
NEUTROPHILS RELATIVE PERCENT: 46.3 % (ref 43–80)
PDW BLD-RTO: 24.7 FL (ref 11.5–15)
PLATELET # BLD: 466 E9/L (ref 130–450)
PMV BLD AUTO: 10.3 FL (ref 7–12)
RBC # BLD: 4 E12/L (ref 3.5–5.5)
RETIC HGB EQUIVALENT: 32.1 PG (ref 28.2–36.6)
RETICULOCYTE ABSOLUTE COUNT: 0.01 E12/L
RETICULOCYTE COUNT PCT: 0.3 % (ref 0.4–1.9)
TOTAL IRON BINDING CAPACITY: 319 MCG/DL (ref 250–450)
WBC # BLD: 11.3 E9/L (ref 4.5–11.5)

## 2020-03-16 PROCEDURE — 82728 ASSAY OF FERRITIN: CPT

## 2020-03-16 PROCEDURE — 83550 IRON BINDING TEST: CPT

## 2020-03-16 PROCEDURE — 36415 COLL VENOUS BLD VENIPUNCTURE: CPT

## 2020-03-16 PROCEDURE — 85045 AUTOMATED RETICULOCYTE COUNT: CPT

## 2020-03-16 PROCEDURE — 85025 COMPLETE CBC W/AUTO DIFF WBC: CPT

## 2020-03-16 PROCEDURE — 83540 ASSAY OF IRON: CPT

## 2020-10-08 ENCOUNTER — HOSPITAL ENCOUNTER (OUTPATIENT)
Age: 70
Discharge: HOME | End: 2020-10-10
Payer: COMMERCIAL

## 2020-10-08 LAB
ALBUMIN SERPL-MCNC: 4.7 G/DL (ref 3.5–5.2)
ALP BLD-CCNC: 68 U/L (ref 35–104)
ALT SERPL-CCNC: 8 U/L (ref 0–32)
ANION GAP SERPL CALCULATED.3IONS-SCNC: 14 MMOL/L (ref 7–16)
AST SERPL-CCNC: 17 U/L (ref 0–31)
BASOPHILS ABSOLUTE: 0.06 E9/L (ref 0–0.2)
BASOPHILS RELATIVE PERCENT: 0.8 % (ref 0–2)
BILIRUB SERPL-MCNC: 0.4 MG/DL (ref 0–1.2)
BUN BLDV-MCNC: 19 MG/DL (ref 8–23)
CALCIUM SERPL-MCNC: 10 MG/DL (ref 8.6–10.2)
CHLORIDE BLD-SCNC: 101 MMOL/L (ref 98–107)
CO2: 22 MMOL/L (ref 22–29)
CREAT SERPL-MCNC: 1.3 MG/DL (ref 0.5–1)
EOSINOPHILS ABSOLUTE: 0.13 E9/L (ref 0.05–0.5)
EOSINOPHILS RELATIVE PERCENT: 1.7 % (ref 0–6)
GFR AFRICAN AMERICAN: 49
GFR NON-AFRICAN AMERICAN: 49 ML/MIN/1.73
GLUCOSE BLD-MCNC: 92 MG/DL (ref 74–99)
HCT VFR BLD CALC: 39.9 % (ref 34–48)
HEMOGLOBIN: 12.9 G/DL (ref 11.5–15.5)
IMMATURE GRANULOCYTES #: 0.03 E9/L
IMMATURE GRANULOCYTES %: 0.4 % (ref 0–5)
LYMPHOCYTES ABSOLUTE: 2.46 E9/L (ref 1.5–4)
LYMPHOCYTES RELATIVE PERCENT: 32.5 % (ref 20–42)
MCH RBC QN AUTO: 28.9 PG (ref 26–35)
MCHC RBC AUTO-ENTMCNC: 32.3 % (ref 32–34.5)
MCV RBC AUTO: 89.3 FL (ref 80–99.9)
MONOCYTES ABSOLUTE: 1.11 E9/L (ref 0.1–0.95)
MONOCYTES RELATIVE PERCENT: 14.7 % (ref 2–12)
NEUTROPHILS ABSOLUTE: 3.77 E9/L (ref 1.8–7.3)
NEUTROPHILS RELATIVE PERCENT: 49.9 % (ref 43–80)
PDW BLD-RTO: 15.9 FL (ref 11.5–15)
PLATELET # BLD: 409 E9/L (ref 130–450)
PMV BLD AUTO: 10.8 FL (ref 7–12)
POTASSIUM SERPL-SCNC: 4.1 MMOL/L (ref 3.5–5)
RBC # BLD: 4.47 E12/L (ref 3.5–5.5)
SODIUM BLD-SCNC: 137 MMOL/L (ref 132–146)
TOTAL PROTEIN: 8.1 G/DL (ref 6.4–8.3)
WBC # BLD: 7.6 E9/L (ref 4.5–11.5)

## 2020-10-08 PROCEDURE — 85025 COMPLETE CBC W/AUTO DIFF WBC: CPT

## 2020-10-08 PROCEDURE — 80053 COMPREHEN METABOLIC PANEL: CPT

## 2021-01-12 ENCOUNTER — APPOINTMENT (OUTPATIENT)
Dept: CT IMAGING | Age: 71
End: 2021-01-12
Payer: COMMERCIAL

## 2021-01-12 ENCOUNTER — HOSPITAL ENCOUNTER (EMERGENCY)
Age: 71
Discharge: HOME | End: 2021-01-12
Payer: COMMERCIAL

## 2021-01-12 ENCOUNTER — APPOINTMENT (OUTPATIENT)
Dept: ULTRASOUND IMAGING | Age: 71
End: 2021-01-12
Payer: COMMERCIAL

## 2021-01-12 VITALS
DIASTOLIC BLOOD PRESSURE: 54 MMHG | BODY MASS INDEX: 27.29 KG/M2 | RESPIRATION RATE: 14 BRPM | HEART RATE: 68 BPM | TEMPERATURE: 96.5 F | HEIGHT: 58 IN | OXYGEN SATURATION: 98 % | WEIGHT: 130 LBS | SYSTOLIC BLOOD PRESSURE: 128 MMHG

## 2021-01-12 DIAGNOSIS — R10.33 PERIUMBILICAL ABDOMINAL PAIN: Primary | ICD-10-CM

## 2021-01-12 LAB
ALBUMIN SERPL-MCNC: 4.6 G/DL (ref 3.5–5.2)
ALP BLD-CCNC: 80 U/L (ref 35–104)
ALT SERPL-CCNC: 19 U/L (ref 0–32)
ANION GAP SERPL CALCULATED.3IONS-SCNC: 10 MMOL/L (ref 7–16)
AST SERPL-CCNC: 13 U/L (ref 0–31)
BACTERIA: ABNORMAL /HPF
BASOPHILS ABSOLUTE: 0.08 E9/L (ref 0–0.2)
BASOPHILS RELATIVE PERCENT: 1.1 % (ref 0–2)
BILIRUB SERPL-MCNC: 0.4 MG/DL (ref 0–1.2)
BILIRUBIN URINE: NEGATIVE
BLOOD, URINE: ABNORMAL
BUN BLDV-MCNC: 24 MG/DL (ref 8–23)
CALCIUM SERPL-MCNC: 9.8 MG/DL (ref 8.6–10.2)
CHLORIDE BLD-SCNC: 102 MMOL/L (ref 98–107)
CLARITY: CLEAR
CO2: 26 MMOL/L (ref 22–29)
COLOR: YELLOW
CREAT SERPL-MCNC: 1.3 MG/DL (ref 0.5–1)
EOSINOPHILS ABSOLUTE: 0.1 E9/L (ref 0.05–0.5)
EOSINOPHILS RELATIVE PERCENT: 1.3 % (ref 0–6)
EPITHELIAL CELLS, UA: ABNORMAL /HPF
GFR AFRICAN AMERICAN: 49
GFR NON-AFRICAN AMERICAN: 49 ML/MIN/1.73
GLUCOSE BLD-MCNC: 90 MG/DL (ref 74–99)
GLUCOSE URINE: NEGATIVE MG/DL
HCT VFR BLD CALC: 38.5 % (ref 34–48)
HEMOGLOBIN: 12.8 G/DL (ref 11.5–15.5)
IMMATURE GRANULOCYTES #: 0.04 E9/L
IMMATURE GRANULOCYTES %: 0.5 % (ref 0–5)
KETONES, URINE: NEGATIVE MG/DL
LACTIC ACID: 0.8 MMOL/L (ref 0.5–2.2)
LEUKOCYTE ESTERASE, URINE: ABNORMAL
LIPASE: 25 U/L (ref 13–60)
LYMPHOCYTES ABSOLUTE: 1.3 E9/L (ref 1.5–4)
LYMPHOCYTES RELATIVE PERCENT: 17.5 % (ref 20–42)
MCH RBC QN AUTO: 29.2 PG (ref 26–35)
MCHC RBC AUTO-ENTMCNC: 33.2 % (ref 32–34.5)
MCV RBC AUTO: 87.9 FL (ref 80–99.9)
MONOCYTES ABSOLUTE: 1.16 E9/L (ref 0.1–0.95)
MONOCYTES RELATIVE PERCENT: 15.6 % (ref 2–12)
NEUTROPHILS ABSOLUTE: 4.75 E9/L (ref 1.8–7.3)
NEUTROPHILS RELATIVE PERCENT: 64 % (ref 43–80)
NITRITE, URINE: NEGATIVE
PDW BLD-RTO: 16.8 FL (ref 11.5–15)
PH UA: 6 (ref 5–9)
PLATELET # BLD: 409 E9/L (ref 130–450)
PMV BLD AUTO: 10.2 FL (ref 7–12)
POTASSIUM REFLEX MAGNESIUM: 3.9 MMOL/L (ref 3.5–5)
PROTEIN UA: NEGATIVE MG/DL
RBC # BLD: 4.38 E12/L (ref 3.5–5.5)
RBC UA: ABNORMAL /HPF (ref 0–2)
SODIUM BLD-SCNC: 138 MMOL/L (ref 132–146)
SPECIFIC GRAVITY UA: 1.01 (ref 1–1.03)
TOTAL PROTEIN: 8.3 G/DL (ref 6.4–8.3)
TROPONIN: <0.01 NG/ML (ref 0–0.03)
UROBILINOGEN, URINE: 0.2 E.U./DL
WBC # BLD: 7.4 E9/L (ref 4.5–11.5)
WBC UA: ABNORMAL /HPF (ref 0–5)

## 2021-01-12 PROCEDURE — 74177 CT ABD & PELVIS W/CONTRAST: CPT

## 2021-01-12 PROCEDURE — 2580000003 HC RX 258: Performed by: PHYSICIAN ASSISTANT

## 2021-01-12 PROCEDURE — 85025 COMPLETE CBC W/AUTO DIFF WBC: CPT

## 2021-01-12 PROCEDURE — 76705 ECHO EXAM OF ABDOMEN: CPT

## 2021-01-12 PROCEDURE — 83605 ASSAY OF LACTIC ACID: CPT

## 2021-01-12 PROCEDURE — 80053 COMPREHEN METABOLIC PANEL: CPT

## 2021-01-12 PROCEDURE — 83690 ASSAY OF LIPASE: CPT

## 2021-01-12 PROCEDURE — 6360000004 HC RX CONTRAST MEDICATION: Performed by: RADIOLOGY

## 2021-01-12 PROCEDURE — 84484 ASSAY OF TROPONIN QUANT: CPT

## 2021-01-12 PROCEDURE — 81001 URINALYSIS AUTO W/SCOPE: CPT

## 2021-01-12 PROCEDURE — 93005 ELECTROCARDIOGRAM TRACING: CPT | Performed by: NURSE PRACTITIONER

## 2021-01-12 PROCEDURE — 99285 EMERGENCY DEPT VISIT HI MDM: CPT

## 2021-01-12 RX ORDER — 0.9 % SODIUM CHLORIDE 0.9 %
1000 INTRAVENOUS SOLUTION INTRAVENOUS ONCE
Status: COMPLETED | OUTPATIENT
Start: 2021-01-12 | End: 2021-01-12

## 2021-01-12 RX ADMIN — IOPAMIDOL 75 ML: 755 INJECTION, SOLUTION INTRAVENOUS at 21:02

## 2021-01-12 RX ADMIN — SODIUM CHLORIDE 1000 ML: 9 INJECTION, SOLUTION INTRAVENOUS at 20:46

## 2021-01-12 ASSESSMENT — PAIN SCALES - GENERAL: PAINLEVEL_OUTOF10: 7

## 2021-01-12 ASSESSMENT — PAIN DESCRIPTION - ONSET: ONSET: SUDDEN

## 2021-01-12 ASSESSMENT — PAIN DESCRIPTION - FREQUENCY: FREQUENCY: INTERMITTENT

## 2021-01-12 ASSESSMENT — PAIN DESCRIPTION - DESCRIPTORS: DESCRIPTORS: DULL

## 2021-01-12 NOTE — ED NOTES
FIRST PROVIDER CONTACT ASSESSMENT NOTE      Department of Emergency Medicine   1/12/21  5:34 PM EST    Chief Complaint: Abdominal Pain (RUQ starting Sunday, sharp stabbing intermittent pain)      History of Present Illness: Chula Harmon is a 78 y.o. female who presents to the ED for right upper quadrant pain that started Sunday. She states she is also having constipation and denies any nausea, vomiting or diarrhea. She denies any shortness breath or chest pain. Medical History:  has a past medical history of Acid reflux, Analgesic rebound headache, Chronic back pain, Constipation, Headache(784.0), History of sinus problem, Hyperlipidemia, Hypertension, Memory loss, short term, Neuropathy, Osteoarthritis, Pain in neck, Rheumatoid arthritis(714.0), Ringing in ears, and TIA (transient ischemic attack). Surgical History:  has a past surgical history that includes Colonoscopy (2006); Hysterectomy; cardiovascular stress test (2003); Tubal ligation; Tooth Extraction (Right, October 2014); shoulder surgery (Left); and shoulder surgery (Right). Social History:  reports that she has been smoking cigarettes. She has a 40.00 pack-year smoking history. She has never used smokeless tobacco. She reports that she does not drink alcohol or use drugs. Family History: family history includes Alzheimer's Disease in her mother; Bleeding Prob in her mother; Cancer in her father; High Cholesterol in her mother; Other in her mother.     *ALLERGIES*     Codeine, Hydrocodone-acetaminophen, and Ultram [tramadol hcl]     Physical Exam:      VS:  /63   Pulse 80   Temp 96.5 °F (35.8 °C) (Temporal)   Resp 16   Ht 4' 10\" (1.473 m)   Wt 130 lb (59 kg)   SpO2 99%   BMI 27.17 kg/m²      Initial Plan of Care:  Initiate Treatment-Testing, Proceed toTreatment Area When Bed Available for ED Attending/MLP to Continue Care    -----------------END OF FIRST PROVIDER CONTACT ASSESSMENT NOTE-------------- Electronically signed by VIOLETA Ascencio CNP   DD: 1/12/21             VIOLETA Ascencio CNP  01/12/21 1732

## 2021-01-13 LAB
EKG ATRIAL RATE: 70 BPM
EKG P AXIS: 51 DEGREES
EKG P-R INTERVAL: 156 MS
EKG Q-T INTERVAL: 424 MS
EKG QRS DURATION: 66 MS
EKG QTC CALCULATION (BAZETT): 457 MS
EKG R AXIS: -4 DEGREES
EKG T AXIS: 24 DEGREES
EKG VENTRICULAR RATE: 70 BPM

## 2021-01-13 PROCEDURE — 93010 ELECTROCARDIOGRAM REPORT: CPT | Performed by: INTERNAL MEDICINE

## 2021-01-13 NOTE — ED PROVIDER NOTES
[facility]  Department of Emergency Medicine   ED  Encounter Note  Admit Date/RoomTime: 2021  8:00 PM  ED Room:     NAME: David Sun  : 1950  MRN: [id number]     Chief Complaint:  Abdominal Pain (RUQ starting , sharp stabbing intermittent pain)    History of Present Illness        David Sun is a 78 y.o. old female who presents to the emergency department by private vehicle for sudden onset, waxing and waning episodes colicky, cramping pain in the periumbilical area and in the RLQ without radiation which began 3 day(s) prior to arrival.   There has been no similar episodes in the past.  Since onset the symptoms have been waxing and waning. The pain is associated with nothing pertinent. Pt reports she has histoyr of constipation due to medications but takes additional medication to help with this problem. The pain is aggravated by nothing in particular and relieved by nothing. There has been NO back pain, chills, cloudy urine, diarrhea, dysuria, hematuria, urinary frequency, vomiting or fever, loss of appetite. .  ROS   Pertinent positives and negatives are stated within HPI, all other systems reviewed and are negative. Past Medical History:  has a past medical history of Acid reflux, Analgesic rebound headache, Chronic back pain, Constipation, Headache(784.0), History of sinus problem, Hyperlipidemia, Hypertension, Memory loss, short term, Neuropathy, Osteoarthritis, Pain in neck, Rheumatoid arthritis(714.0), Ringing in ears, and TIA (transient ischemic attack). Surgical History has a past surgical history that includes Colonoscopy (); Hysterectomy; cardiovascular stress test (); Tubal ligation; Tooth Extraction (Right, 2014); shoulder surgery (Left); and shoulder surgery (Right). Social History:  reports that she has been smoking cigarettes. She has a 20.00 pack-year smoking history. She has never used smokeless tobacco. She reports that she does not drink alcohol or use drugs. Family History: family history includes Alzheimer's Disease in her mother; Bleeding Prob in her mother; Cancer in her father; High Cholesterol in her mother; Other in her mother. Allergies: Codeine, Hydrocodone-acetaminophen, and Ultram [tramadol hcl]    Physical Exam   Oxygen Saturation Interpretation: Normal.        ED Triage Vitals [01/12/21 1729]   BP Temp Temp Source Pulse Resp SpO2 Height Weight   114/63 96.5 °F (35.8 °C) Temporal 80 16 99 % 4' 10\" (1.473 m) 130 lb (59 kg)         General Appearance/Constitutional:  Alert, development consistent with age  [de-identified]:  NC/NT. PERRLA. Airway patent. Neck:  Supple. No lymphadenopathy. Respiratory:  No retractions. Lungs Clear to auscultation and breath sounds equal.  CV:  Regular rate and rhythm. GI:  normal appearing, non-distended with no visible hernias. Bowel sounds: normal bowel sounds. Tenderness: There is mild tenderness present - located right periumbilical., There is no rebound tenderness. , There is no guarding. , There is no distension. , There is no pulsatile mass. .           Liver: non-tender. Spleen:  non-tender. Back: CVA Tenderness: No.  Integument:  Normal turgor. Warm, dry, without visible rash, unless noted elsewhere. Lymphatics: No edema, cap.refill <3sec. Neurological:  Orientation age-appropriate. Motor functions intact.     Lab / Imaging Results   (All laboratory and radiology results have been personally reviewed by myself)  Labs:  Results for orders placed or performed during the hospital encounter of 01/12/21   CBC Auto Differential   Result Value Ref Range    WBC 7.4 4.5 - 11.5 E9/L    RBC 4.38 3.50 - 5.50 E12/L    Hemoglobin 12.8 11.5 - 15.5 g/dL    Hematocrit 38.5 34.0 - 48.0 % MCV 87.9 80.0 - 99.9 fL    MCH 29.2 26.0 - 35.0 pg    MCHC 33.2 32.0 - 34.5 %    RDW 16.8 (H) 11.5 - 15.0 fL    Platelets 372 722 - 619 E9/L    MPV 10.2 7.0 - 12.0 fL    Neutrophils % 64.0 43.0 - 80.0 %    Immature Granulocytes % 0.5 0.0 - 5.0 %    Lymphocytes % 17.5 (L) 20.0 - 42.0 %    Monocytes % 15.6 (H) 2.0 - 12.0 %    Eosinophils % 1.3 0.0 - 6.0 %    Basophils % 1.1 0.0 - 2.0 %    Neutrophils Absolute 4.75 1.80 - 7.30 E9/L    Immature Granulocytes # 0.04 E9/L    Lymphocytes Absolute 1.30 (L) 1.50 - 4.00 E9/L    Monocytes Absolute 1.16 (H) 0.10 - 0.95 E9/L    Eosinophils Absolute 0.10 0.05 - 0.50 E9/L    Basophils Absolute 0.08 0.00 - 0.20 E9/L   Comprehensive Metabolic Panel w/ Reflex to MG   Result Value Ref Range    Sodium 138 132 - 146 mmol/L    Potassium reflex Magnesium 3.9 3.5 - 5.0 mmol/L    Chloride 102 98 - 107 mmol/L    CO2 26 22 - 29 mmol/L    Anion Gap 10 7 - 16 mmol/L    Glucose 90 74 - 99 mg/dL    BUN 24 (H) 8 - 23 mg/dL    CREATININE 1.3 (H) 0.5 - 1.0 mg/dL    GFR Non-African American 49 >=60 mL/min/1.73    GFR African American 49     Calcium 9.8 8.6 - 10.2 mg/dL    Total Protein 8.3 6.4 - 8.3 g/dL    Alb 4.6 3.5 - 5.2 g/dL    Total Bilirubin 0.4 0.0 - 1.2 mg/dL    Alkaline Phosphatase 80 35 - 104 U/L    ALT 19 0 - 32 U/L    AST 13 0 - 31 U/L   Lipase   Result Value Ref Range    Lipase 25 13 - 60 U/L   Troponin   Result Value Ref Range    Troponin <0.01 0.00 - 0.03 ng/mL   Urinalysis, reflex to microscopic   Result Value Ref Range    Color, UA Yellow Straw/Yellow    Clarity, UA Clear Clear    Glucose, Ur Negative Negative mg/dL    Bilirubin Urine Negative Negative    Ketones, Urine Negative Negative mg/dL    Specific Gravity, UA 1.010 1.005 - 1.030    Blood, Urine MODERATE (A) Negative    pH, UA 6.0 5.0 - 9.0    Protein, UA Negative Negative mg/dL    Urobilinogen, Urine 0.2 <2.0 E.U./dL    Nitrite, Urine Negative Negative    Leukocyte Esterase, Urine SMALL (A) Negative   Lactic Acid, Plasma Result Value Ref Range    Lactic Acid 0.8 0.5 - 2.2 mmol/L   Microscopic Urinalysis   Result Value Ref Range    WBC, UA 2-5 0 - 5 /HPF    RBC, UA 2-5 0 - 2 /HPF    Epithelial Cells, UA FEW /HPF    Bacteria, UA RARE (A) None Seen /HPF   EKG 12 Lead   Result Value Ref Range    Ventricular Rate 70 BPM    Atrial Rate 70 BPM    P-R Interval 156 ms    QRS Duration 66 ms    Q-T Interval 424 ms    QTc Calculation (Bazett) 457 ms    P Axis 51 degrees    R Axis -4 degrees    T Axis 24 degrees     Imaging: All Radiology results interpreted by Radiologist unless otherwise noted. CT ABDOMEN PELVIS W IV CONTRAST Additional Contrast? None   Final Result   1. Appendix is not definitively visualized, however no inflammatory changes   in its expected location. 2.  Small nonspecific free fluid in pelvis. 3.  Multiple cysts or hemangiomas associated with the liver as well as few   cysts associated both kidneys. Ultrasound follow-up may be helpful for   further evaluation. US GALLBLADDER RUQ   Final Result   Sonographically normal gallbladder and common bile duct. 1.2 cm complex hepatic cyst.   0.7 cm right renal cyst.        EKG: Interpreted by emergency department physician unless otherwise noted. EKG #1:  Interpreted by emergency department physician unless otherwise noted. Time:  1903    Rate: 70 bpm  Rhythm: Sinus rhythm. Interpretation: Normal sinus rhythm. Comparison: There are no significant changes when compared to prior tracings. ED Course / Medical Decision Making     Medications   0.9 % sodium chloride bolus (0 mLs Intravenous Stopped 1/12/21 2149)   iopamidol (ISOVUE-370) 76 % injection 75 mL (75 mLs Intravenous Given 1/12/21 2102)        Re-Evaluations:  1/12/21      Time: pt reports she has no pain currently.     Consultations:             None    Procedures:   none MDM: Patient presents to emergency with right periumbilical pain and some right lower quadrant pain on physical exam, no nausea, vomiting, diarrhea, fever, loss of appetite present. Patient currently asymptomatic in ED. Episodes of pain are waxing and waning. Patient ultrasound of right upper quadrant unremarkable and CAT scan showing no acute pathology. Patient reports a chronic history with constipation due to long-term use of hydrocodone for chronic pain issues. Patient reports she does use Dulcolax as needed when she had trouble moving her bowels, which works. Patient is advised to follow-up with primary care or gastroenterology for colonoscopy as needed. Plan of Care/Counseling:  I reviewed today's visit with the patient in addition to providing specific details for the plan of care and counseling regarding the diagnosis and prognosis. Questions are answered at this time and are agreeable with the plan. Assessment      1. Periumbilical abdominal pain      This patient's ED course included: a personal history and physicial examination and IV medications  This patient has remained hemodynamically stable during their ED course. Plan   Discharged home  Patient condition is stable. New Medications     Discharge Medication List as of 1/12/2021  9:39 PM        Electronically signed by Tuan Park PA-C   DD: 1/12/21  **This report was transcribed using voice recognition software. Every effort was made to ensure accuracy; however, inadvertent computerized transcription errors may be present.   END OF PROVIDER NOTE       Tuan Park PA-C  01/13/21 9674

## 2021-02-05 LAB
ALBUMIN SERPL-MCNC: 4.1 G/DL (ref 3.5–5.2)
ALP BLD-CCNC: 55 U/L (ref 35–104)
ALT SERPL-CCNC: 19 U/L (ref 0–32)
ANION GAP SERPL CALCULATED.3IONS-SCNC: 14 MMOL/L (ref 7–16)
AST SERPL-CCNC: 22 U/L (ref 0–31)
BASOPHILS ABSOLUTE: 0.04 E9/L (ref 0–0.2)
BASOPHILS RELATIVE PERCENT: 0.6 % (ref 0–2)
BILIRUB SERPL-MCNC: 0.3 MG/DL (ref 0–1.2)
BUN BLDV-MCNC: 19 MG/DL (ref 8–23)
C-REACTIVE PROTEIN: 0.3 MG/DL (ref 0–0.4)
CALCIUM SERPL-MCNC: 9.3 MG/DL (ref 8.6–10.2)
CHLORIDE BLD-SCNC: 108 MMOL/L (ref 98–107)
CO2: 23 MMOL/L (ref 22–29)
CREAT SERPL-MCNC: 1.1 MG/DL (ref 0.5–1)
EOSINOPHILS ABSOLUTE: 0.13 E9/L (ref 0.05–0.5)
EOSINOPHILS RELATIVE PERCENT: 1.9 % (ref 0–6)
GFR AFRICAN AMERICAN: 60
GFR NON-AFRICAN AMERICAN: 49 ML/MIN/1.73
GLUCOSE BLD-MCNC: 85 MG/DL (ref 74–99)
HCT VFR BLD CALC: 34.1 % (ref 34–48)
HEMOGLOBIN: 10.8 G/DL (ref 11.5–15.5)
IMMATURE GRANULOCYTES #: 0.04 E9/L
IMMATURE GRANULOCYTES %: 0.6 % (ref 0–5)
LYMPHOCYTES ABSOLUTE: 1.7 E9/L (ref 1.5–4)
LYMPHOCYTES RELATIVE PERCENT: 24.4 % (ref 20–42)
MCH RBC QN AUTO: 28.8 PG (ref 26–35)
MCHC RBC AUTO-ENTMCNC: 31.7 % (ref 32–34.5)
MCV RBC AUTO: 90.9 FL (ref 80–99.9)
MONOCYTES ABSOLUTE: 0.92 E9/L (ref 0.1–0.95)
MONOCYTES RELATIVE PERCENT: 13.2 % (ref 2–12)
NEUTROPHILS ABSOLUTE: 4.15 E9/L (ref 1.8–7.3)
NEUTROPHILS RELATIVE PERCENT: 59.3 % (ref 43–80)
PDW BLD-RTO: 16.9 FL (ref 11.5–15)
PLATELET # BLD: 361 E9/L (ref 130–450)
PMV BLD AUTO: 11.3 FL (ref 7–12)
POTASSIUM SERPL-SCNC: 4 MMOL/L (ref 3.5–5)
RBC # BLD: 3.75 E12/L (ref 3.5–5.5)
RHEUMATOID FACTOR: 46 IU/ML (ref 0–13)
SODIUM BLD-SCNC: 145 MMOL/L (ref 132–146)
TOTAL PROTEIN: 6.8 G/DL (ref 6.4–8.3)
VITAMIN D 25-HYDROXY: 24 NG/ML (ref 30–100)
WBC # BLD: 7 E9/L (ref 4.5–11.5)

## 2021-04-05 ENCOUNTER — IMMUNIZATION (OUTPATIENT)
Dept: PRIMARY CARE CLINIC | Age: 71
End: 2021-04-05
Payer: COMMERCIAL

## 2021-04-05 PROCEDURE — 0001A COVID-19, PFIZER VACCINE 30MCG/0.3ML DOSE: CPT | Performed by: NURSE PRACTITIONER

## 2021-04-05 PROCEDURE — 91300 COVID-19, PFIZER VACCINE 30MCG/0.3ML DOSE: CPT | Performed by: NURSE PRACTITIONER

## 2021-05-04 ENCOUNTER — IMMUNIZATION (OUTPATIENT)
Dept: PRIMARY CARE CLINIC | Age: 71
End: 2021-05-04
Payer: COMMERCIAL

## 2021-05-04 PROCEDURE — 0002A COVID-19, PFIZER VACCINE 30MCG/0.3ML DOSE: CPT | Performed by: NURSE PRACTITIONER

## 2021-05-04 PROCEDURE — 91300 COVID-19, PFIZER VACCINE 30MCG/0.3ML DOSE: CPT | Performed by: NURSE PRACTITIONER

## 2021-05-07 LAB
ALBUMIN SERPL-MCNC: 4.1 G/DL (ref 3.5–5.2)
ALP BLD-CCNC: 71 U/L (ref 35–104)
ALT SERPL-CCNC: 8 U/L (ref 0–32)
ANION GAP SERPL CALCULATED.3IONS-SCNC: 13 MMOL/L (ref 7–16)
AST SERPL-CCNC: 18 U/L (ref 0–31)
BASOPHILS ABSOLUTE: 0.06 E9/L (ref 0–0.2)
BASOPHILS RELATIVE PERCENT: 0.8 % (ref 0–2)
BILIRUB SERPL-MCNC: 0.2 MG/DL (ref 0–1.2)
BUN BLDV-MCNC: 12 MG/DL (ref 6–23)
CALCIUM SERPL-MCNC: 9.4 MG/DL (ref 8.6–10.2)
CHLORIDE BLD-SCNC: 108 MMOL/L (ref 98–107)
CO2: 22 MMOL/L (ref 22–29)
CREAT SERPL-MCNC: 1.3 MG/DL (ref 0.5–1)
EOSINOPHILS ABSOLUTE: 0.09 E9/L (ref 0.05–0.5)
EOSINOPHILS RELATIVE PERCENT: 1.1 % (ref 0–6)
GFR AFRICAN AMERICAN: 49
GFR NON-AFRICAN AMERICAN: 49 ML/MIN/1.73
GLUCOSE BLD-MCNC: 96 MG/DL (ref 74–99)
HCT VFR BLD CALC: 39.7 % (ref 34–48)
HEMOGLOBIN: 12.1 G/DL (ref 11.5–15.5)
IMMATURE GRANULOCYTES #: 0.05 E9/L
IMMATURE GRANULOCYTES %: 0.6 % (ref 0–5)
LYMPHOCYTES ABSOLUTE: 1.06 E9/L (ref 1.5–4)
LYMPHOCYTES RELATIVE PERCENT: 13.4 % (ref 20–42)
MCH RBC QN AUTO: 27.3 PG (ref 26–35)
MCHC RBC AUTO-ENTMCNC: 30.5 % (ref 32–34.5)
MCV RBC AUTO: 89.4 FL (ref 80–99.9)
MONOCYTES ABSOLUTE: 0.66 E9/L (ref 0.1–0.95)
MONOCYTES RELATIVE PERCENT: 8.4 % (ref 2–12)
NEUTROPHILS ABSOLUTE: 5.98 E9/L (ref 1.8–7.3)
NEUTROPHILS RELATIVE PERCENT: 75.7 % (ref 43–80)
PDW BLD-RTO: 14.9 FL (ref 11.5–15)
PLATELET # BLD: 402 E9/L (ref 130–450)
PMV BLD AUTO: 11.2 FL (ref 7–12)
POTASSIUM SERPL-SCNC: 4.4 MMOL/L (ref 3.5–5)
RBC # BLD: 4.44 E12/L (ref 3.5–5.5)
SODIUM BLD-SCNC: 143 MMOL/L (ref 132–146)
TOTAL PROTEIN: 7.3 G/DL (ref 6.4–8.3)
WBC # BLD: 7.9 E9/L (ref 4.5–11.5)

## 2021-11-18 ENCOUNTER — IMMUNIZATION (OUTPATIENT)
Dept: PRIMARY CARE CLINIC | Age: 71
End: 2021-11-18
Payer: COMMERCIAL

## 2021-11-18 PROCEDURE — 0004A COVID-19, PFIZER VACCINE 30MCG/0.3ML DOSE: CPT | Performed by: NURSE PRACTITIONER

## 2021-11-18 PROCEDURE — 91300 COVID-19, PFIZER VACCINE 30MCG/0.3ML DOSE: CPT | Performed by: NURSE PRACTITIONER

## 2023-05-02 PROBLEM — D72.829 LEUKOCYTOSIS: Status: RESOLVED | Noted: 2020-01-06 | Resolved: 2023-05-02

## 2023-05-02 PROBLEM — T78.3XXA ACE INHIBITOR-AGGRAVATED ANGIOEDEMA, INITIAL ENCOUNTER: Status: RESOLVED | Noted: 2018-04-14 | Resolved: 2023-05-02

## 2023-05-02 PROBLEM — T46.4X5A ACE INHIBITOR-AGGRAVATED ANGIOEDEMA, INITIAL ENCOUNTER: Status: RESOLVED | Noted: 2018-04-14 | Resolved: 2023-05-02

## 2023-05-02 PROBLEM — G44.40 ANALGESIC REBOUND HEADACHE: Chronic | Status: RESOLVED | Noted: 2019-01-08 | Resolved: 2023-05-02

## 2023-05-02 PROBLEM — R19.5 OCCULT GI BLEEDING: Status: RESOLVED | Noted: 2020-01-05 | Resolved: 2023-05-02

## 2023-05-02 PROBLEM — T78.3XXA ANGIOEDEMA OF LIPS: Status: RESOLVED | Noted: 2018-04-14 | Resolved: 2023-05-02

## 2023-05-02 PROBLEM — T39.95XA ANALGESIC REBOUND HEADACHE: Chronic | Status: RESOLVED | Noted: 2019-01-08 | Resolved: 2023-05-02

## 2023-05-02 PROBLEM — D64.9 ANEMIA REQUIRING TRANSFUSIONS: Status: RESOLVED | Noted: 2020-01-03 | Resolved: 2023-05-02

## 2023-05-02 PROBLEM — E66.9 OBESITY (BMI 30-39.9): Chronic | Status: RESOLVED | Noted: 2018-04-14 | Resolved: 2023-05-02

## 2023-05-03 ENCOUNTER — OFFICE VISIT (OUTPATIENT)
Dept: CARDIOLOGY CLINIC | Age: 73
End: 2023-05-03
Payer: COMMERCIAL

## 2023-05-03 VITALS
HEART RATE: 86 BPM | WEIGHT: 124.6 LBS | SYSTOLIC BLOOD PRESSURE: 100 MMHG | DIASTOLIC BLOOD PRESSURE: 60 MMHG | BODY MASS INDEX: 26.16 KG/M2 | HEIGHT: 58 IN

## 2023-05-03 DIAGNOSIS — Z01.810 PREOP CARDIOVASCULAR EXAM: Primary | ICD-10-CM

## 2023-05-03 DIAGNOSIS — M25.551 PAIN OF RIGHT HIP: ICD-10-CM

## 2023-05-03 PROCEDURE — 4004F PT TOBACCO SCREEN RCVD TLK: CPT | Performed by: INTERNAL MEDICINE

## 2023-05-03 PROCEDURE — G8419 CALC BMI OUT NRM PARAM NOF/U: HCPCS | Performed by: INTERNAL MEDICINE

## 2023-05-03 PROCEDURE — 99204 OFFICE O/P NEW MOD 45 MIN: CPT | Performed by: INTERNAL MEDICINE

## 2023-05-03 PROCEDURE — G8399 PT W/DXA RESULTS DOCUMENT: HCPCS | Performed by: INTERNAL MEDICINE

## 2023-05-03 PROCEDURE — 1090F PRES/ABSN URINE INCON ASSESS: CPT | Performed by: INTERNAL MEDICINE

## 2023-05-03 PROCEDURE — 3078F DIAST BP <80 MM HG: CPT | Performed by: INTERNAL MEDICINE

## 2023-05-03 PROCEDURE — 93000 ELECTROCARDIOGRAM COMPLETE: CPT | Performed by: INTERNAL MEDICINE

## 2023-05-03 PROCEDURE — 3074F SYST BP LT 130 MM HG: CPT | Performed by: INTERNAL MEDICINE

## 2023-05-03 PROCEDURE — G8427 DOCREV CUR MEDS BY ELIG CLIN: HCPCS | Performed by: INTERNAL MEDICINE

## 2023-05-03 PROCEDURE — 1123F ACP DISCUSS/DSCN MKR DOCD: CPT | Performed by: INTERNAL MEDICINE

## 2023-05-03 PROCEDURE — 3017F COLORECTAL CA SCREEN DOC REV: CPT | Performed by: INTERNAL MEDICINE

## 2023-05-03 RX ORDER — TOFACITINIB 5 MG/1
TABLET, FILM COATED ORAL
COMMUNITY
Start: 2023-04-20

## 2023-05-03 NOTE — PROGRESS NOTES
Chief Complaint   Patient presents with    Cardiac Clearance       Patient Active Problem List    Diagnosis Date Noted    Tobacco abuse 01/06/2020    COPD (chronic obstructive pulmonary disease) ([facility] [address] 01/05/2020    Symptomatic anemia 01/05/2020    Stage 3 chronic kidney disease ([facility] 01/04/2020    Intervertebral disc disorders with radiculopathy, lumbar region 04/17/2018    Chronic migraine 04/17/2018    Hyperlipidemia LDL goal <100 04/14/2018    Essential hypertension 04/14/2018    Cervical radiculopathy, chronic 10/28/2015    GERD (gastroesophageal reflux disease) 06/07/2011       Current Outpatient Medications   Medication Sig Dispense Refill    XELJANZ 5 MG TABS       gabapentin (NEURONTIN) 300 MG capsule Take 1 capsule by mouth 2 times daily. HYDROcodone-acetaminophen (NORCO) 7.5-325 MG per tablet Take 1 tablet by mouth every 8 hours as needed for Pain.       lisinopril-hydrochlorothiazide (PRINZIDE;ZESTORETIC) 10-12.5 MG per tablet Take 1 tablet by mouth daily      atorvastatin (LIPITOR) 20 MG tablet Take 1 tablet by mouth daily 30 tablet 5    diclofenac sodium 1 % GEL Apply 2 g topically 4 times daily as needed for Pain 1 Tube 5    senna-docusate (SENOKOT S) 8.6-50 MG per tablet Take 4 tablets by mouth every evening 120 tablet 5    Multiple Vitamin (MVI, CELEBRATE, CHEWABLE TABLET) Take 1 tablet by mouth daily 30 tablet 5    predniSONE (DELTASONE) 5 MG tablet Take 1 tablet by mouth daily      pantoprazole (PROTONIX) 40 MG tablet Take 1 tablet by mouth every morning (before breakfast) 60 tablet 0    methotrexate 2.5 MG tablet Take 8 tablets by mouth every 14 days Indications: takes 8 tablets every 14 days      folic acid (FOLVITE) 1 MG tablet Take 1 tablet by mouth daily      bisacodyl (DULCOLAX) 5 MG EC tablet Take 2 tablets by mouth daily as needed for Constipation (Patient not taking: Reported on 5/3/2023) 30 tablet 2    SUMAtriptan (IMITREX) 100 MG tablet Take 0.5 tablets by mouth once as needed for

## 2023-05-08 ENCOUNTER — HOSPITAL ENCOUNTER (OUTPATIENT)
Age: 73
Discharge: HOME | End: 2023-05-10

## 2023-05-08 LAB
ABO + RH BLD: NORMAL
BLD GP AB SCN SERPL QL: NORMAL

## 2023-05-08 PROCEDURE — 86901 BLOOD TYPING SEROLOGIC RH(D): CPT

## 2023-05-08 PROCEDURE — 87081 CULTURE SCREEN ONLY: CPT

## 2023-05-08 PROCEDURE — 86900 BLOOD TYPING SEROLOGIC ABO: CPT

## 2023-05-08 PROCEDURE — 86850 RBC ANTIBODY SCREEN: CPT

## 2023-05-11 LAB — ORGANISM: ABNORMAL

## 2023-05-20 ENCOUNTER — HOSPITAL ENCOUNTER (OUTPATIENT)
Age: 73
Discharge: HOME | End: 2023-05-22

## 2023-05-20 LAB
ANION GAP SERPL CALCULATED.3IONS-SCNC: 9 MMOL/L (ref 7–16)
BUN SERPL-MCNC: 21 MG/DL (ref 6–23)
CALCIUM SERPL-MCNC: 8.9 MG/DL (ref 8.6–10.2)
CHLORIDE SERPL-SCNC: 108 MMOL/L (ref 98–107)
CO2 SERPL-SCNC: 22 MMOL/L (ref 22–29)
CREAT SERPL-MCNC: 1.1 MG/DL (ref 0.5–1)
ERYTHROCYTE [DISTWIDTH] IN BLOOD BY AUTOMATED COUNT: 16.9 FL (ref 11.5–15)
GLUCOSE SERPL-MCNC: 131 MG/DL (ref 74–99)
HCT VFR BLD AUTO: 29 % (ref 34–48)
HGB BLD-MCNC: 9.6 G/DL (ref 11.5–15.5)
MCH RBC QN AUTO: 27.9 PG (ref 26–35)
MCHC RBC AUTO-ENTMCNC: 33.1 % (ref 32–34.5)
MCV RBC AUTO: 84.3 FL (ref 80–99.9)
PLATELET # BLD AUTO: 337 E9/L (ref 130–450)
PMV BLD AUTO: 11.1 FL (ref 7–12)
POTASSIUM SERPL-SCNC: 5.1 MMOL/L (ref 3.5–5)
RBC # BLD AUTO: 3.44 E12/L (ref 3.5–5.5)
SODIUM SERPL-SCNC: 139 MMOL/L (ref 132–146)
WBC # BLD: 15 E9/L (ref 4.5–11.5)

## 2023-05-20 PROCEDURE — 85027 COMPLETE CBC AUTOMATED: CPT

## 2023-05-20 PROCEDURE — 80048 BASIC METABOLIC PNL TOTAL CA: CPT

## 2023-06-24 ENCOUNTER — APPOINTMENT (OUTPATIENT)
Dept: GENERAL RADIOLOGY | Age: 73
End: 2023-06-24
Payer: COMMERCIAL

## 2023-06-24 ENCOUNTER — HOSPITAL ENCOUNTER (EMERGENCY)
Age: 73
Discharge: HOME | End: 2023-06-24
Attending: EMERGENCY MEDICINE
Payer: COMMERCIAL

## 2023-06-24 VITALS
HEIGHT: 58 IN | SYSTOLIC BLOOD PRESSURE: 116 MMHG | DIASTOLIC BLOOD PRESSURE: 52 MMHG | HEART RATE: 77 BPM | OXYGEN SATURATION: 97 % | BODY MASS INDEX: 28.34 KG/M2 | TEMPERATURE: 98.2 F | WEIGHT: 135 LBS | RESPIRATION RATE: 16 BRPM

## 2023-06-24 DIAGNOSIS — T81.31XA DISRUPTION OF EXTERNAL SURGICAL WOUND, INITIAL ENCOUNTER: Primary | ICD-10-CM

## 2023-06-24 PROCEDURE — 87077 CULTURE AEROBIC IDENTIFY: CPT

## 2023-06-24 PROCEDURE — 87186 SC STD MICRODIL/AGAR DIL: CPT

## 2023-06-24 PROCEDURE — 73502 X-RAY EXAM HIP UNI 2-3 VIEWS: CPT

## 2023-06-24 PROCEDURE — 99284 EMERGENCY DEPT VISIT MOD MDM: CPT

## 2023-06-24 PROCEDURE — 87205 SMEAR GRAM STAIN: CPT

## 2023-06-24 PROCEDURE — 87070 CULTURE OTHR SPECIMN AEROBIC: CPT

## 2023-06-24 RX ORDER — CEPHALEXIN 500 MG/1
500 CAPSULE ORAL 4 TIMES DAILY
Qty: 28 CAPSULE | Refills: 0 | Status: SHIPPED | OUTPATIENT
Start: 2023-06-24 | End: 2023-07-01

## 2023-06-24 NOTE — ED PROVIDER NOTES
to see you on Tuesday      DISCHARGE MEDICATIONS:  Discharge Medication List as of 6/24/2023  2:17 PM        START taking these medications    Details   cephALEXin (KEFLEX) 500 MG capsule Take 1 capsule by mouth 4 times daily for 7 days, Disp-28 capsule, R-0Normal             DISCONTINUED MEDICATIONS:  Discharge Medication List as of 6/24/2023  2:17 PM                 (Please note that portions of this note were completed with a voice recognition program.  Efforts were made to edit the dictations but occasionally words are mis-transcribed.)         Lisbeth Arevalo MD  Resident  06/24/23 [id number]

## 2023-06-25 LAB
BACTERIA WND AEROBE CULT: ABNORMAL
GRAM STN SPEC: ABNORMAL
ORGANISM: ABNORMAL

## 2023-06-27 LAB
BACTERIA WND AEROBE CULT: ABNORMAL
BACTERIA WND AEROBE CULT: ABNORMAL
GRAM STN SPEC: ABNORMAL
ORGANISM: ABNORMAL
ORGANISM: ABNORMAL

## 2023-06-29 ENCOUNTER — HOSPITAL ENCOUNTER (OUTPATIENT)
Age: 73
Discharge: HOME | End: 2023-07-01

## 2023-06-30 ENCOUNTER — HOSPITAL ENCOUNTER (OUTPATIENT)
Age: 73
Discharge: HOME | End: 2023-07-02

## 2023-06-30 LAB
ANION GAP SERPL CALCULATED.3IONS-SCNC: 9 MMOL/L (ref 7–16)
ANISOCYTOSIS: ABNORMAL
BASOPHILS # BLD: 0.01 E9/L (ref 0–0.2)
BASOPHILS NFR BLD: 0.1 % (ref 0–2)
BUN SERPL-MCNC: 16 MG/DL (ref 6–23)
BURR CELLS: ABNORMAL
CALCIUM SERPL-MCNC: 8.5 MG/DL (ref 8.6–10.2)
CHLORIDE SERPL-SCNC: 108 MMOL/L (ref 98–107)
CO2 SERPL-SCNC: 22 MMOL/L (ref 22–29)
CREAT SERPL-MCNC: 0.9 MG/DL (ref 0.5–1)
EOSINOPHIL # BLD: 0 E9/L (ref 0.05–0.5)
EOSINOPHIL NFR BLD: 0 % (ref 0–6)
ERYTHROCYTE [DISTWIDTH] IN BLOOD BY AUTOMATED COUNT: 18.2 FL (ref 11.5–15)
GLUCOSE SERPL-MCNC: 131 MG/DL (ref 74–99)
HCT VFR BLD AUTO: 22.9 % (ref 34–48)
HGB BLD-MCNC: 7.5 G/DL (ref 11.5–15.5)
HYPOCHROMIA: ABNORMAL
IMM GRANULOCYTES # BLD: 0.08 E9/L
IMM GRANULOCYTES NFR BLD: 1 % (ref 0–5)
LYMPHOCYTES # BLD: 0.64 E9/L (ref 1.5–4)
LYMPHOCYTES NFR BLD: 7.8 % (ref 20–42)
MCH RBC QN AUTO: 27.8 PG (ref 26–35)
MCHC RBC AUTO-ENTMCNC: 32.8 % (ref 32–34.5)
MCV RBC AUTO: 84.8 FL (ref 80–99.9)
MONOCYTES # BLD: 1.59 E9/L (ref 0.1–0.95)
MONOCYTES NFR BLD: 19.4 % (ref 2–12)
NEUTROPHILS # BLD: 5.88 E9/L (ref 1.8–7.3)
NEUTS SEG NFR BLD: 71.7 % (ref 43–80)
OVALOCYTES: ABNORMAL
PLATELET # BLD AUTO: 478 E9/L (ref 130–450)
PMV BLD AUTO: 10.6 FL (ref 7–12)
POIKILOCYTES: ABNORMAL
POLYCHROMASIA: ABNORMAL
POTASSIUM SERPL-SCNC: 4.4 MMOL/L (ref 3.5–5)
RBC # BLD AUTO: 2.7 E12/L (ref 3.5–5.5)
SCHISTOCYTES: ABNORMAL
SODIUM SERPL-SCNC: 139 MMOL/L (ref 132–146)
WBC # BLD: 8.2 E9/L (ref 4.5–11.5)

## 2023-06-30 PROCEDURE — 80048 BASIC METABOLIC PNL TOTAL CA: CPT

## 2023-06-30 PROCEDURE — 85025 COMPLETE CBC W/AUTO DIFF WBC: CPT

## 2023-07-02 LAB
BACTERIA SPEC ANAEROBE CULT: NORMAL
BACTERIA WND AEROBE CULT: ABNORMAL
ORGANISM: ABNORMAL

## 2023-07-03 ENCOUNTER — HOSPITAL ENCOUNTER (OUTPATIENT)
Dept: PHYSICAL THERAPY | Age: 73
Setting detail: THERAPIES SERIES
Discharge: HOME | End: 2023-07-03

## 2023-07-03 NOTE — PROGRESS NOTES
[address]                Phone: [phone]  Fax: [phone]    Physical Therapy  Cancellation/No-show Note  Patient Name:  Yobani Agrawal  :  1950   Date:  7/3/2023    For today's appointment patient:  [x]  Cancelled  []  Rescheduled appointment  []  No-show     Reason given by patient:  []  Patient ill  []  Conflicting appointment  []  No transportation    []  Conflict with work  []  No reason given  [x]  Other:  pt cancelled initial evaluation due to recent hospital stay for infection; will call back later date to reschedule once cleared. Comments:      Electronically signed by:   Brittani Burdick PT

## 2023-08-15 ENCOUNTER — HOSPITAL ENCOUNTER (INPATIENT)
Age: 73
LOS: 3 days | Discharge: HOME | DRG: 241 | End: 2023-08-18
Attending: EMERGENCY MEDICINE | Admitting: STUDENT IN AN ORGANIZED HEALTH CARE EDUCATION/TRAINING PROGRAM
Payer: COMMERCIAL

## 2023-08-15 ENCOUNTER — TELEPHONE (OUTPATIENT)
Dept: INTERNAL MEDICINE CLINIC | Age: 73
End: 2023-08-15

## 2023-08-15 DIAGNOSIS — D64.9 SYMPTOMATIC ANEMIA: Primary | ICD-10-CM

## 2023-08-15 DIAGNOSIS — K92.2 GASTROINTESTINAL HEMORRHAGE, UNSPECIFIED GASTROINTESTINAL HEMORRHAGE TYPE: ICD-10-CM

## 2023-08-15 LAB
ANION GAP SERPL CALCULATED.3IONS-SCNC: 13 MMOL/L (ref 7–16)
BASOPHILS # BLD: 0.04 K/UL (ref 0–0.2)
BASOPHILS NFR BLD: 0 % (ref 0–2)
BUN SERPL-MCNC: 11 MG/DL (ref 6–23)
CALCIUM SERPL-MCNC: 8.9 MG/DL (ref 8.6–10.2)
CHLORIDE SERPL-SCNC: 104 MMOL/L (ref 98–107)
CO2 SERPL-SCNC: 22 MMOL/L (ref 22–29)
CREAT SERPL-MCNC: 1 MG/DL (ref 0.5–1)
EOSINOPHIL # BLD: 0.15 K/UL (ref 0.05–0.5)
EOSINOPHILS RELATIVE PERCENT: 2 % (ref 0–6)
ERYTHROCYTE [DISTWIDTH] IN BLOOD BY AUTOMATED COUNT: 20.4 % (ref 11.5–15)
GFR SERPL CREATININE-BSD FRML MDRD: 58 ML/MIN/1.73M2
GLUCOSE SERPL-MCNC: 93 MG/DL (ref 74–99)
HCT VFR BLD AUTO: 21.3 % (ref 34–48)
HGB BLD-MCNC: 6.6 G/DL (ref 11.5–15.5)
IMM GRANULOCYTES # BLD AUTO: 0.07 K/UL (ref 0–0.58)
IMM GRANULOCYTES NFR BLD: 1 % (ref 0–5)
INR PPP: 1.2
LYMPHOCYTES NFR BLD: 1.37 K/UL (ref 1.5–4)
LYMPHOCYTES RELATIVE PERCENT: 14 % (ref 20–42)
MCH RBC QN AUTO: 24.5 PG (ref 26–35)
MCHC RBC AUTO-ENTMCNC: 31 G/DL (ref 32–34.5)
MCV RBC AUTO: 79.2 FL (ref 80–99.9)
MONOCYTES NFR BLD: 1.74 K/UL (ref 0.1–0.95)
MONOCYTES NFR BLD: 17 % (ref 2–12)
NEUTROPHILS NFR BLD: 67 % (ref 43–80)
NEUTS SEG NFR BLD: 6.77 K/UL (ref 1.8–7.3)
PARTIAL THROMBOPLASTIN TIME: 33.1 SEC (ref 24.5–35.1)
PLATELET # BLD AUTO: 587 K/UL (ref 130–450)
PMV BLD AUTO: 10.7 FL (ref 7–12)
POTASSIUM SERPL-SCNC: 3.4 MMOL/L (ref 3.5–5)
PROTHROMBIN TIME: 13.1 SEC (ref 9.3–12.4)
RBC # BLD AUTO: 2.69 M/UL (ref 3.5–5.5)
RBC # BLD: ABNORMAL 10*6/UL
SODIUM SERPL-SCNC: 139 MMOL/L (ref 132–146)
WBC OTHER # BLD: 10.1 K/UL (ref 4.5–11.5)

## 2023-08-15 PROCEDURE — 6370000000 HC RX 637 (ALT 250 FOR IP): Performed by: STUDENT IN AN ORGANIZED HEALTH CARE EDUCATION/TRAINING PROGRAM

## 2023-08-15 PROCEDURE — 86901 BLOOD TYPING SEROLOGIC RH(D): CPT

## 2023-08-15 PROCEDURE — 86923 COMPATIBILITY TEST ELECTRIC: CPT

## 2023-08-15 PROCEDURE — 80048 BASIC METABOLIC PNL TOTAL CA: CPT

## 2023-08-15 PROCEDURE — 86850 RBC ANTIBODY SCREEN: CPT

## 2023-08-15 PROCEDURE — 85730 THROMBOPLASTIN TIME PARTIAL: CPT

## 2023-08-15 PROCEDURE — 82728 ASSAY OF FERRITIN: CPT

## 2023-08-15 PROCEDURE — 83550 IRON BINDING TEST: CPT

## 2023-08-15 PROCEDURE — 85610 PROTHROMBIN TIME: CPT

## 2023-08-15 PROCEDURE — 86900 BLOOD TYPING SEROLOGIC ABO: CPT

## 2023-08-15 PROCEDURE — 99285 EMERGENCY DEPT VISIT HI MDM: CPT

## 2023-08-15 PROCEDURE — 83540 ASSAY OF IRON: CPT

## 2023-08-15 PROCEDURE — 85025 COMPLETE CBC W/AUTO DIFF WBC: CPT

## 2023-08-15 PROCEDURE — 2060000000 HC ICU INTERMEDIATE R&B

## 2023-08-15 RX ORDER — SODIUM CHLORIDE 9 MG/ML
INJECTION, SOLUTION INTRAVENOUS PRN
Status: DISCONTINUED | OUTPATIENT
Start: 2023-08-15 | End: 2023-08-18 | Stop reason: HOSPADM

## 2023-08-15 RX ORDER — POTASSIUM CHLORIDE 20 MEQ/1
40 TABLET, EXTENDED RELEASE ORAL ONCE
Status: COMPLETED | OUTPATIENT
Start: 2023-08-15 | End: 2023-08-15

## 2023-08-15 RX ADMIN — POTASSIUM CHLORIDE 40 MEQ: 1500 TABLET, EXTENDED RELEASE ORAL at 23:44

## 2023-08-15 ASSESSMENT — LIFESTYLE VARIABLES
HOW OFTEN DO YOU HAVE A DRINK CONTAINING ALCOHOL: NEVER
HOW MANY STANDARD DRINKS CONTAINING ALCOHOL DO YOU HAVE ON A TYPICAL DAY: PATIENT DOES NOT DRINK

## 2023-08-15 ASSESSMENT — PAIN - FUNCTIONAL ASSESSMENT: PAIN_FUNCTIONAL_ASSESSMENT: NONE - DENIES PAIN

## 2023-08-15 NOTE — TELEPHONE ENCOUNTER
Received a perfect serve regarding critical outpatient lab results. Notified that hemoglobin level today was 6.6. I spoke with the patient. She reports that she had 2 recent surgeries done at [facility].  In May 19 she had a hip replacement. That she also had a skin infection that was according to the patient treated with the prior. She notes that following the hip replacement reviewed procedure she was prescribed 325 mg aspirin. She reports that she has been off of that for a while now and has not been taking any blood thinners. Symptom wise,  she does report occasional lightheadedness but denies any LOC, bloody bowel movements, melena, hematochezia. Denies any active blood loss or bruising. I emphasized the importance of going to the nearest emergency department as soon as possible to receive the appropriate treatment and possible unit of blood transfusion. Patient expressed understanding.     Electronically signed by Marc Herrera MD on 8/15/2023 at 6:25 PM

## 2023-08-16 ENCOUNTER — ANESTHESIA EVENT (OUTPATIENT)
Dept: ENDOSCOPY | Age: 73
End: 2023-08-16
Payer: COMMERCIAL

## 2023-08-16 ENCOUNTER — ANESTHESIA (OUTPATIENT)
Dept: ENDOSCOPY | Age: 73
End: 2023-08-16
Payer: COMMERCIAL

## 2023-08-16 PROBLEM — K92.2 GI BLEED: Status: ACTIVE | Noted: 2023-08-16

## 2023-08-16 PROBLEM — R55 PRE-SYNCOPE: Status: ACTIVE | Noted: 2023-08-16

## 2023-08-16 LAB
FERRITIN SERPL-MCNC: 42 NG/ML
HCT VFR BLD AUTO: 24.3 % (ref 34–48)
HGB BLD-MCNC: 7.7 G/DL (ref 11.5–15.5)
IRON SATN MFR SERPL: 3 % (ref 15–50)
IRON SERPL-MCNC: 10 UG/DL (ref 37–145)
TIBC SERPL-MCNC: 292 UG/DL (ref 250–450)

## 2023-08-16 PROCEDURE — 6370000000 HC RX 637 (ALT 250 FOR IP): Performed by: STUDENT IN AN ORGANIZED HEALTH CARE EDUCATION/TRAINING PROGRAM

## 2023-08-16 PROCEDURE — 6360000002 HC RX W HCPCS: Performed by: STUDENT IN AN ORGANIZED HEALTH CARE EDUCATION/TRAINING PROGRAM

## 2023-08-16 PROCEDURE — A4216 STERILE WATER/SALINE, 10 ML: HCPCS | Performed by: STUDENT IN AN ORGANIZED HEALTH CARE EDUCATION/TRAINING PROGRAM

## 2023-08-16 PROCEDURE — 2709999900 HC NON-CHARGEABLE SUPPLY: Performed by: INTERNAL MEDICINE

## 2023-08-16 PROCEDURE — 2580000003 HC RX 258: Performed by: STUDENT IN AN ORGANIZED HEALTH CARE EDUCATION/TRAINING PROGRAM

## 2023-08-16 PROCEDURE — 0DB68ZX EXCISION OF STOMACH, VIA NATURAL OR ARTIFICIAL OPENING ENDOSCOPIC, DIAGNOSTIC: ICD-10-PCS | Performed by: INTERNAL MEDICINE

## 2023-08-16 PROCEDURE — 7100000011 HC PHASE II RECOVERY - ADDTL 15 MIN: Performed by: INTERNAL MEDICINE

## 2023-08-16 PROCEDURE — 87077 CULTURE AEROBIC IDENTIFY: CPT

## 2023-08-16 PROCEDURE — 3700000000 HC ANESTHESIA ATTENDED CARE: Performed by: INTERNAL MEDICINE

## 2023-08-16 PROCEDURE — 6370000000 HC RX 637 (ALT 250 FOR IP): Performed by: INTERNAL MEDICINE

## 2023-08-16 PROCEDURE — 6360000002 HC RX W HCPCS: Performed by: NURSE ANESTHETIST, CERTIFIED REGISTERED

## 2023-08-16 PROCEDURE — 6360000002 HC RX W HCPCS: Performed by: INTERNAL MEDICINE

## 2023-08-16 PROCEDURE — C9113 INJ PANTOPRAZOLE SODIUM, VIA: HCPCS | Performed by: INTERNAL MEDICINE

## 2023-08-16 PROCEDURE — 3609017700 HC EGD DILATION GASTRIC/DUODENAL STRICTURE: Performed by: INTERNAL MEDICINE

## 2023-08-16 PROCEDURE — 0DB98ZX EXCISION OF DUODENUM, VIA NATURAL OR ARTIFICIAL OPENING ENDOSCOPIC, DIAGNOSTIC: ICD-10-PCS | Performed by: INTERNAL MEDICINE

## 2023-08-16 PROCEDURE — 2060000000 HC ICU INTERMEDIATE R&B

## 2023-08-16 PROCEDURE — 36430 TRANSFUSION BLD/BLD COMPNT: CPT

## 2023-08-16 PROCEDURE — 7100000010 HC PHASE II RECOVERY - FIRST 15 MIN: Performed by: INTERNAL MEDICINE

## 2023-08-16 PROCEDURE — 85018 HEMOGLOBIN: CPT

## 2023-08-16 PROCEDURE — 88305 TISSUE EXAM BY PATHOLOGIST: CPT

## 2023-08-16 PROCEDURE — 99222 1ST HOSP IP/OBS MODERATE 55: CPT | Performed by: STUDENT IN AN ORGANIZED HEALTH CARE EDUCATION/TRAINING PROGRAM

## 2023-08-16 PROCEDURE — 86900 BLOOD TYPING SEROLOGIC ABO: CPT

## 2023-08-16 PROCEDURE — C9113 INJ PANTOPRAZOLE SODIUM, VIA: HCPCS | Performed by: STUDENT IN AN ORGANIZED HEALTH CARE EDUCATION/TRAINING PROGRAM

## 2023-08-16 PROCEDURE — 2580000003 HC RX 258: Performed by: INTERNAL MEDICINE

## 2023-08-16 PROCEDURE — 2580000003 HC RX 258: Performed by: NURSE ANESTHETIST, CERTIFIED REGISTERED

## 2023-08-16 PROCEDURE — P9016 RBC LEUKOCYTES REDUCED: HCPCS

## 2023-08-16 PROCEDURE — 85014 HEMATOCRIT: CPT

## 2023-08-16 PROCEDURE — 0D758ZZ DILATION OF ESOPHAGUS, VIA NATURAL OR ARTIFICIAL OPENING ENDOSCOPIC: ICD-10-PCS | Performed by: INTERNAL MEDICINE

## 2023-08-16 PROCEDURE — A4216 STERILE WATER/SALINE, 10 ML: HCPCS | Performed by: INTERNAL MEDICINE

## 2023-08-16 PROCEDURE — 36415 COLL VENOUS BLD VENIPUNCTURE: CPT

## 2023-08-16 PROCEDURE — 30233N1 TRANSFUSION OF NONAUTOLOGOUS RED BLOOD CELLS INTO PERIPHERAL VEIN, PERCUTANEOUS APPROACH: ICD-10-PCS | Performed by: STUDENT IN AN ORGANIZED HEALTH CARE EDUCATION/TRAINING PROGRAM

## 2023-08-16 RX ORDER — SODIUM CHLORIDE 0.9 % (FLUSH) 0.9 %
5-40 SYRINGE (ML) INJECTION EVERY 12 HOURS SCHEDULED
Status: DISCONTINUED | OUTPATIENT
Start: 2023-08-16 | End: 2023-08-18 | Stop reason: HOSPADM

## 2023-08-16 RX ORDER — SODIUM CHLORIDE 0.9 % (FLUSH) 0.9 %
5-40 SYRINGE (ML) INJECTION PRN
Status: DISCONTINUED | OUTPATIENT
Start: 2023-08-16 | End: 2023-08-18 | Stop reason: HOSPADM

## 2023-08-16 RX ORDER — SODIUM CHLORIDE, SODIUM LACTATE, POTASSIUM CHLORIDE, CALCIUM CHLORIDE 600; 310; 30; 20 MG/100ML; MG/100ML; MG/100ML; MG/100ML
INJECTION, SOLUTION INTRAVENOUS CONTINUOUS
Status: DISCONTINUED | OUTPATIENT
Start: 2023-08-16 | End: 2023-08-17

## 2023-08-16 RX ORDER — FLUCONAZOLE 100 MG/1
100 TABLET ORAL DAILY
Status: DISCONTINUED | OUTPATIENT
Start: 2023-08-16 | End: 2023-08-18 | Stop reason: HOSPADM

## 2023-08-16 RX ORDER — ONDANSETRON 2 MG/ML
4 INJECTION INTRAMUSCULAR; INTRAVENOUS EVERY 6 HOURS PRN
Status: DISCONTINUED | OUTPATIENT
Start: 2023-08-16 | End: 2023-08-18 | Stop reason: HOSPADM

## 2023-08-16 RX ORDER — SODIUM CHLORIDE 9 MG/ML
INJECTION, SOLUTION INTRAVENOUS PRN
Status: DISCONTINUED | OUTPATIENT
Start: 2023-08-16 | End: 2023-08-18 | Stop reason: HOSPADM

## 2023-08-16 RX ORDER — DOCUSATE SODIUM 100 MG/1
200 CAPSULE, LIQUID FILLED ORAL DAILY
Status: DISCONTINUED | OUTPATIENT
Start: 2023-08-17 | End: 2023-08-18 | Stop reason: HOSPADM

## 2023-08-16 RX ORDER — LACTULOSE 10 G/15ML
20 SOLUTION ORAL DAILY
Status: DISCONTINUED | OUTPATIENT
Start: 2023-08-17 | End: 2023-08-18 | Stop reason: HOSPADM

## 2023-08-16 RX ORDER — SODIUM CHLORIDE, SODIUM LACTATE, POTASSIUM CHLORIDE, CALCIUM CHLORIDE 600; 310; 30; 20 MG/100ML; MG/100ML; MG/100ML; MG/100ML
INJECTION, SOLUTION INTRAVENOUS CONTINUOUS PRN
Status: DISCONTINUED | OUTPATIENT
Start: 2023-08-16 | End: 2023-08-16 | Stop reason: SDUPTHER

## 2023-08-16 RX ORDER — HYDROCODONE BITARTRATE AND ACETAMINOPHEN 5; 325 MG/1; MG/1
1 TABLET ORAL EVERY 6 HOURS PRN
Status: DISCONTINUED | OUTPATIENT
Start: 2023-08-16 | End: 2023-08-18 | Stop reason: HOSPADM

## 2023-08-16 RX ORDER — ACETAMINOPHEN 650 MG/1
650 SUPPOSITORY RECTAL EVERY 6 HOURS PRN
Status: DISCONTINUED | OUTPATIENT
Start: 2023-08-16 | End: 2023-08-18 | Stop reason: HOSPADM

## 2023-08-16 RX ORDER — ONDANSETRON 4 MG/1
4 TABLET, ORALLY DISINTEGRATING ORAL EVERY 8 HOURS PRN
Status: DISCONTINUED | OUTPATIENT
Start: 2023-08-16 | End: 2023-08-18 | Stop reason: HOSPADM

## 2023-08-16 RX ORDER — ACETAMINOPHEN 325 MG/1
650 TABLET ORAL EVERY 6 HOURS PRN
Status: DISCONTINUED | OUTPATIENT
Start: 2023-08-16 | End: 2023-08-18 | Stop reason: HOSPADM

## 2023-08-16 RX ORDER — PROPOFOL 10 MG/ML
INJECTION, EMULSION INTRAVENOUS PRN
Status: DISCONTINUED | OUTPATIENT
Start: 2023-08-16 | End: 2023-08-16 | Stop reason: SDUPTHER

## 2023-08-16 RX ORDER — POLYETHYLENE GLYCOL 3350 17 G/17G
17 POWDER, FOR SOLUTION ORAL DAILY PRN
Status: DISCONTINUED | OUTPATIENT
Start: 2023-08-16 | End: 2023-08-18 | Stop reason: HOSPADM

## 2023-08-16 RX ADMIN — SODIUM CHLORIDE, POTASSIUM CHLORIDE, SODIUM LACTATE AND CALCIUM CHLORIDE: 600; 310; 30; 20 INJECTION, SOLUTION INTRAVENOUS at 04:51

## 2023-08-16 RX ADMIN — FLUCONAZOLE 100 MG: 100 TABLET ORAL at 15:07

## 2023-08-16 RX ADMIN — HYDROCODONE BITARTRATE AND ACETAMINOPHEN 1 TABLET: 5; 325 TABLET ORAL at 17:10

## 2023-08-16 RX ADMIN — PROPOFOL 25 MG: 10 INJECTION, EMULSION INTRAVENOUS at 13:30

## 2023-08-16 RX ADMIN — PROPOFOL 25 MG: 10 INJECTION, EMULSION INTRAVENOUS at 13:27

## 2023-08-16 RX ADMIN — SODIUM CHLORIDE, POTASSIUM CHLORIDE, SODIUM LACTATE AND CALCIUM CHLORIDE: 600; 310; 30; 20 INJECTION, SOLUTION INTRAVENOUS at 17:10

## 2023-08-16 RX ADMIN — SODIUM CHLORIDE, POTASSIUM CHLORIDE, SODIUM LACTATE AND CALCIUM CHLORIDE: 600; 310; 30; 20 INJECTION, SOLUTION INTRAVENOUS at 12:46

## 2023-08-16 RX ADMIN — PROPOFOL 25 MG: 10 INJECTION, EMULSION INTRAVENOUS at 13:26

## 2023-08-16 RX ADMIN — SODIUM CHLORIDE 40 MG: 9 INJECTION INTRAMUSCULAR; INTRAVENOUS; SUBCUTANEOUS at 15:07

## 2023-08-16 RX ADMIN — HYDROCODONE BITARTRATE AND ACETAMINOPHEN 1 TABLET: 5; 325 TABLET ORAL at 02:53

## 2023-08-16 RX ADMIN — PROPOFOL 50 MG: 10 INJECTION, EMULSION INTRAVENOUS at 13:25

## 2023-08-16 RX ADMIN — SODIUM CHLORIDE 40 MG: 9 INJECTION INTRAMUSCULAR; INTRAVENOUS; SUBCUTANEOUS at 06:31

## 2023-08-16 RX ADMIN — HYDROCODONE BITARTRATE AND ACETAMINOPHEN 1 TABLET: 5; 325 TABLET ORAL at 11:04

## 2023-08-16 RX ADMIN — PROPOFOL 25 MG: 10 INJECTION, EMULSION INTRAVENOUS at 13:28

## 2023-08-16 ASSESSMENT — PAIN DESCRIPTION - DESCRIPTORS: DESCRIPTORS: ACHING

## 2023-08-16 ASSESSMENT — PAIN DESCRIPTION - ORIENTATION: ORIENTATION: LOWER

## 2023-08-16 ASSESSMENT — PAIN SCALES - GENERAL
PAINLEVEL_OUTOF10: 7
PAINLEVEL_OUTOF10: 7
PAINLEVEL_OUTOF10: 0
PAINLEVEL_OUTOF10: 7
PAINLEVEL_OUTOF10: 7

## 2023-08-16 ASSESSMENT — PAIN DESCRIPTION - LOCATION: LOCATION: BACK

## 2023-08-16 ASSESSMENT — PAIN - FUNCTIONAL ASSESSMENT: PAIN_FUNCTIONAL_ASSESSMENT: ACTIVITIES ARE NOT PREVENTED

## 2023-08-16 ASSESSMENT — LIFESTYLE VARIABLES: SMOKING_STATUS: 1

## 2023-08-16 NOTE — H&P
[facility] History and Physical      CHIEF COMPLAINT: Lightheadedness, abnormal lab work    History of Present Illness:    Ms. William Lorenzo is a 82-year-old female with past medical history significant for rheumatoid arthritis, hypertension, hyperlipidemia, TIA who presents with lightheadedness and dizziness episodes as well as abnormal lab work. In talking with Ms. Anthony Curry, she reports she has had 2-3 episodes of lightheadedness and dizziness when standing recently. She was recently cooking, standing in her kitchen when she had sudden onset of lightheadedness and dizziness. She had to grab onto something to hold onto so she did not fall. She denies blood in her stool or dark tarry stools. She told her PCP regarding these episodes and lab work was obtained, she was found to be anemic. She was referred to the [facility] ED for further evaluation. In the ED, vitals on presentation were temp 98.1, RR 16, HR 92, BP 86/73, O2 sat 100% on room air. Lab work was obtained which revealed serum sodium 139, potassium 3.4, bicarb 22, creatinine 1.0, blood glucose 93. CBC was obtained which revealed WBC 10.1, hemoglobin 6.6 with MCV 79.2, platelets 178O. Patient was ordered 1 unit PRBC transfusion and given 40 mEq of Klor-Con. Decision was made to hospitalize the patient and internal medicine was contacted for hospitalization. Upon ROS, the patient reports lightheadedness, dizziness. She denies fevers, chills, loss consciousness, seizure, vision change, chest pain, shortness of breath, nausea, vomiting, abdominal pain, constipation, diarrhea, dysuria, increased urinary frequency, myalgias, joint pain, bleeding, new skin rash.     Informant(s) for H&P: Patient    REVIEW OF SYSTEMS:  A comprehensive review of systems was negative except for: what is in the HPI      PMH:  Past Medical History:   Diagnosis Date    Acid reflux     Analgesic rebound headache 1/8/2019    Chronic

## 2023-08-16 NOTE — PROGRESS NOTES
4 Eyes Skin Assessment     NAME:  Shireen Freeman  YOB: 1950  MEDICAL RECORD NUMBER:  [id number]    The patient is being assessed for  Admission    I agree that at least one RN has performed a thorough Head to Toe Skin Assessment on the patient. ALL assessment sites listed below have been assessed. Areas assessed by both nurses:    Head, Face, Ears, Shoulders, Back, Chest, Arms, Elbows, Hands, Sacrum. Buttock, Coccyx, Ischium, Legs. Feet and Heels, and Under Medical Devices         Does the Patient have a Wound?  No noted wound(s)       Meng Prevention initiated by RN: No  Wound Care Orders initiated by RN: No    Pressure Injury (Stage 3,4, Unstageable, DTI, NWPT, and Complex wounds) if present, place Wound referral order by RN under : No    New Ostomies, if present place, Ostomy referral order under : No     Nurse 1 eSignature: Electronically signed by Traci Reyes RN on 8/16/23 at 2:17 AM EDT    **SHARE this note so that the co-signing nurse can place an eSignature**    Nurse 2 eSignature: {Esignature:[id number]}

## 2023-08-16 NOTE — PROGRESS NOTES
Brief internal medicine progress note:     Patient seen and examined, H&P and billing done on this calendar day by admitting service. I did also seen and examined patient. Admitted with abnormal lab work. Exam:  Abdomen soft, nontender. S1 and S2 normal, no m/r/g    Plan:  No new complaints, denies bleeding. Concern for GIB, GI following, plan for EGD today  Continue to monitor hemoglobin, transfuse if less than 7. Responded well to the unit transfused yesterday.    Hold anticoagulation     Electronically signed by Jeannette Gonzalez MD on 8/16/2023 at 12:50 PM

## 2023-08-16 NOTE — ED PROVIDER NOTES
[facility]  EMERGENCY DEPARTMENT ENCOUNTER        Pt Name: Andre Downey  MRN: [id number]  [address] [facility] 1950  Date of evaluation: 8/15/2023  Provider: Ruth Thomas DO  PCP: Ethel Essex, MD  Note Started: 8:12 PM EDT 8/15/23    CHIEF COMPLAINT       Chief Complaint   Patient presents with    Other     Blood work today at [facility], was told to come to ED for a blood transfusion. Unsure of number but thinks they said around 6. HISTORY OF PRESENT ILLNESS: 1 or more Elements   History From: Patient    Limitations to history : None    Andre Downey is a 81 y.o. female who presents to the emergency department for complaint of abnormal labs. Patient states she had outpatient blood work from [facility] and was told to come to the emergency department for a blood transfusion as her hemoglobin was around 6 today. Patient states she has had a previous similar episode requiring a blood transfusion. She has been feeling increasing fatigue for the past few months after a recent hip surgery back in June. She also reports cravings for chewing on ice. Patient denies any hemoptysis, hematuria, vaginal bleeding, melena, hematochezia. She states when this previously happened she was supposed to have a colonoscopy and endoscopy, however never had it happen. Patient denies any chest pain, shortness of breath, abdominal pain. Nursing Notes were all reviewed and agreed with or any disagreements were addressed in the HPI. REVIEW OF EXTERNAL NOTE :       Preop cardiovascular exam with cardiology 5/3/2023 reviewed. Patient has history of tobacco abuse, COPD, symptomatic anemia, stage III CKD, hypertension. REVIEW OF SYSTEMS :           Positives and Pertinent negatives as per HPI.      SURGICAL HISTORY     Past Surgical History:   Procedure Laterality Date    CARDIOVASCULAR STRESS TEST  2003    normal    COLONOSCOPY  2006    Normal.  Re-referred for diagnostic on 9/10    HYSTERECTOMY

## 2023-08-16 NOTE — ANESTHESIA POSTPROCEDURE EVALUATION
Department of Anesthesiology  Postprocedure Note    Patient:  Tj Varma  MRN: [id number]  YOB: 1950  Date of evaluation: 8/16/2023      Procedure Summary     Date: 08/16/23 Room / Location: [place] [address] / [facility]    Anesthesia Start: 2007 Anesthesia Stop: 1376    Procedures:       EGD BIOPSY      EGD ESOPHAGOGASTRODUODENOSCOPY DILATATION Diagnosis:       Gastrointestinal hemorrhage, unspecified gastrointestinal hemorrhage type      (Gastrointestinal hemorrhage, unspecified gastrointestinal hemorrhage type [K92.2])    Surgeons: Diamond Lopez MD Responsible Provider:     Anesthesia Type: MAC ASA Status: 3          Anesthesia Type: MAC    Miguel Angel Phase I:      Miguel Angel Phase II: Miguel Angel Score: 10      Anesthesia Post Evaluation    Patient location during evaluation: bedside  Patient participation: complete - patient participated  Level of consciousness: awake  Pain score: 0  Airway patency: patent  Nausea & Vomiting: no nausea and no vomiting  Complications: no  Cardiovascular status: hemodynamically stable  Respiratory status: acceptable  Hydration status: stable  Pain management: adequate

## 2023-08-16 NOTE — BRIEF OP NOTE
Brief Postoperative Note    Carrol Berrios  YOB: 1950  [id number]    Procedure: EGD with biopsy    Anesthesia: [facility]    Surgeon:  Genesis Bradley MD    Findings:       Esophagus:  GERD. CANDIDA ESOPHAGITIS . PEPTIC STRICTURE DILATED WITH 52 FR.  TAM      Stomach:  Gastritis bx done to rule out H. Pylori      Duodenum:  Normal    Bx. done to rule out sprue       Complications: None      Estimated blood loss: none      Ely Riojas MD

## 2023-08-16 NOTE — CONSULTS
Gastroenterology Consult Note   KOTA Bravo with Shereen Lieberman M.D. Consult Note        Date of Service: 8/16/2023  Reason for Consult: Concern for GI Bleed  Requesting Physician: Dr. Aniket Parikh:  Abnormal Labs, lightheadedness, dizziness    History Obtained From:  patient, electronic medical record    HISTORY OF PRESENT ILLNESS:       Andre Downey is a 81 y.o. female with significant past medical history of RA on steroids and methotrexate, acid reflux, chronic anemia, HTN, HLD, TIA presenting to ED for abnormal Labs, lightheadedness, dizziness and admitted with anemia and concern for GI bleed. Pt reports dizziness, lightheadedness and SOB with exertion intermittently since her hip surgery in June 2023. Pt saw PCP Monday who ordered labs, and was told to report to ER after results were reviewed showing a Hg of 6.6. Transfused 2 units of PRBC in ER. Pt had similar episode of symptoms and anemia in approximately 0991-1639 requiring blood transfusion. At that time, patient was instructed to get EGD and colonoscopy done as outpatient but did not follow-up. Pt denies abdominal pain, nausea or vomiting. Has intermittent heartburn, takes pantoprazole 40 mg daily. Pt denies loss of appetite, but does c/o \"food sometimes gets stuck\". States she has chronic constipation and takes dulcolax as needed, normally has solid brown BM every 3-4 days. Denies melena or hematochezia, unknown when last BM was. Pt takes prednisone long-term for RA, and was recently on ASA 325mg until end of June for post-op hip. Denies use of alcohol, ibuprofen or anticoagulants. Pt reports having an EGD with dilatation close to 20 years ago, and reports colonoscopy at that time, was told she has polyps. [facility] father and uncle with colon CA. Admission labs K+ 3.4, Hg 6.6, Hct 21.3, INR 1.2. Consultation for concern for GI bleed.   Currently, pt denies abdominal pain, nausea, or vomiting, lightheadedness, dizziness or 08/15/2023 09:38 PM     Iron Saturation:    Lab Results   Component Value Date/Time    LABIRON 3 08/15/2023 09:38 PM     TIBC:    Lab Results   Component Value Date/Time    TIBC 292 08/15/2023 09:38 PM     FERRITIN:    Lab Results   Component Value Date/Time    FERRITIN 42 08/15/2023 09:38 PM       Lab Results   Component Value Date    TRIG 211 (H) 03/26/2016    TRIG 148 12/03/2013    TRIG 88 04/12/2013       Lab Results   Component Value Date    HDL 49 03/26/2016    HDL 62 12/03/2013    HDL 54.0 04/12/2013       Lab Results   Component Value Date    LDLCALC 110 (H) 03/26/2016    LDLCALC 96 12/03/2013    LDLCALC 83 04/12/2013       Lab Results   Component Value Date    LABVLDL 42 03/26/2016        No results found. IMPRESSION:    Anemia, suspicious for acute GI bleed  Presyncope  Electrolyte abnormalities- defer   GERD  Rheumatoid arthritis, on chronic steroids and methotrexate   Dysphagia  Chronic constipation    RECOMMENDATIONS:      EGD today with Dr. Myra Hernandez, procedure discussed pt verbalizes understanding and agrees to proceed. Orders placed  Keep NPO  Hold any anticoagulants   Monitor hgb, transfuse <7 per primary   Iron studies, pending  Protonix 40mg IV BID  Colace and lactulose ordered  Monitor stools  CBC daily  CMP tomorrow  Further evaluation as OP to assess liver cysts, CT results noted from 2021  Supportive care  Continue to monitor     Note: This report was completed utilizing computer voice recognition software. Every effort has been made to ensure accuracy, however; inadvertent computerized transcription errors may be present. Thank you very much for your consultation. We will follow closely with you.     Discussed with Dr. Sherry Hwang developed by Dr. Geoffrey Mendez, APRN-NP-C 8/16/2023 11:31 AM for Dr. Myra Hernandez

## 2023-08-16 NOTE — ANESTHESIA PRE PROCEDURE
Department of Anesthesiology  Preprocedure Note       Name:  Jae Lisa   Age:  81 y.o.  :  1950                                          MRN:  [id number]         Date:  2023      Surgeon: Nidia Borrego):  Gi Valera MD    Procedure: Procedure(s):  EGD DIAGNOSTIC ONLY    Medications prior to admission:   Prior to Admission medications    Medication Sig Start Date End Date Taking? Authorizing Provider   Kevan Lesches 5 MG TABS  23   Historical Provider, MD   bisacodyl (DULCOLAX) 5 MG EC tablet Take 2 tablets by mouth daily as needed for Constipation  Patient not taking: Reported on 5/3/2023 1/6/20   Leticia Burns MD   gabapentin (NEURONTIN) 300 MG capsule Take 1 capsule by mouth 2 times daily. Historical Provider, MD   SUMAtriptan (IMITREX) 100 MG tablet Take 0.5 tablets by mouth once as needed for Migraine Take 1/2 tablet at headache onset, may repeat 1/2 tab. In 2 hrs. But no more than one tablet in 24 hrs. Note; May continue under PCP  Once Rx completed 1/8/19 1/3/20  Karen Caceres MD   HYDROcodone-acetaminophen [facility]) 7.5-325 MG per tablet Take 1 tablet by mouth every 8 hours as needed for Pain.     Historical Provider, MD   lisinopril-hydrochlorothiazide (PRINZIDE;ZESTORETIC) 10-12.5 MG per tablet Take 1 tablet by mouth daily    Historical Provider, MD   famotidine (PEPCID) 20 MG tablet Take 20 mg by mouth 2 times daily  Patient not taking: Reported on 5/3/2023    Historical Provider, MD   methocarbamol (ROBAXIN) 750 MG tablet Take 750 mg by mouth 3 times daily  Patient not taking: Reported on 5/3/2023    Historical Provider, MD   Abatacept 125 MG/ML SOSY Inject into the skin  Patient not taking: Reported on 5/3/2023    Historical Provider, MD   Rrgjyi-AqKxl-VkRwd-Meth-FA-DHA (PRENATE MINI) 18-0.6-0.4-350 MG CAPS Take 1 capsule by mouth daily  Patient not taking: Reported on 5/3/2023 3/5/18   Juan Mcknight MD   atorvastatin (LIPITOR) 20 MG tablet Take 1 tablet by mouth

## 2023-08-16 NOTE — ANESTHESIA POSTPROCEDURE EVALUATION
Department of Anesthesiology  Postprocedure Note    Patient: Pablito Garland  MRN: [id number]  YOB: 1950  Date of evaluation: 8/16/2023      Procedure Summary     Date: 08/16/23 Room / Location: [facility] / [facility]    Anesthesia Start: 3947 Anesthesia Stop: 2888    Procedures:       EGD BIOPSY      EGD ESOPHAGOGASTRODUODENOSCOPY DILATATION Diagnosis:       Gastrointestinal hemorrhage, unspecified gastrointestinal hemorrhage type      (Gastrointestinal hemorrhage, unspecified gastrointestinal hemorrhage type [K92.2])    Surgeons: Fareed Patel MD Responsible Provider:     Anesthesia Type: MAC ASA Status: 3          Anesthesia Type: MAC    Miguel Angel Phase I:      Miguel Angel Phase II: Miguel Angel Score: 10      Anesthesia Post Evaluation    Patient location during evaluation: bedside  Patient participation: complete - patient participated  Level of consciousness: awake and alert  Pain score: 0  Airway patency: patent  Nausea & Vomiting: no nausea and no vomiting  Complications: no  Cardiovascular status: hemodynamically stable  Respiratory status: acceptable, spontaneous ventilation and room air  Hydration status: stable  Comments: Pt denies questions or needs at this time.    Pain management: adequate and satisfactory to patient

## 2023-08-17 PROBLEM — D64.9 SYMPTOMATIC ANEMIA: Status: ACTIVE | Noted: 2023-08-17

## 2023-08-17 PROBLEM — B37.81 CANDIDA ESOPHAGITIS (HCC): Status: ACTIVE | Noted: 2023-08-17

## 2023-08-17 PROBLEM — K22.2 PEPTIC STRICTURE OF ESOPHAGUS: Status: ACTIVE | Noted: 2023-08-17

## 2023-08-17 LAB
ABO/RH: NORMAL
ALBUMIN SERPL-MCNC: 3 G/DL (ref 3.5–5.2)
ALP SERPL-CCNC: 78 U/L (ref 35–104)
ALT SERPL-CCNC: 7 U/L (ref 0–32)
ANION GAP SERPL CALCULATED.3IONS-SCNC: 8 MMOL/L (ref 7–16)
ANTIBODY SCREEN: NEGATIVE
ARM BAND NUMBER: NORMAL
AST SERPL-CCNC: 11 U/L (ref 0–31)
BASOPHILS # BLD: 0.08 K/UL (ref 0–0.2)
BASOPHILS NFR BLD: 1 % (ref 0–2)
BILIRUB DIRECT SERPL-MCNC: <0.2 MG/DL (ref 0–0.3)
BILIRUB INDIRECT SERPL-MCNC: ABNORMAL MG/DL (ref 0–1)
BILIRUB SERPL-MCNC: 0.2 MG/DL (ref 0–1.2)
BLOOD BANK BLOOD PRODUCT EXPIRATION DATE: NORMAL
BLOOD BANK DISPENSE STATUS: NORMAL
BLOOD BANK ISBT PRODUCT BLOOD TYPE: 6200
BLOOD BANK PRODUCT CODE: NORMAL
BLOOD BANK SAMPLE EXPIRATION: NORMAL
BLOOD BANK UNIT TYPE AND RH: NORMAL
BPU ID: NORMAL
BUN SERPL-MCNC: 11 MG/DL (ref 6–23)
CALCIUM SERPL-MCNC: 8.5 MG/DL (ref 8.6–10.2)
CHLORIDE SERPL-SCNC: 109 MMOL/L (ref 98–107)
CO2 SERPL-SCNC: 22 MMOL/L (ref 22–29)
COMPONENT: NORMAL
CREAT SERPL-MCNC: 1 MG/DL (ref 0.5–1)
CROSSMATCH RESULT: NORMAL
EOSINOPHIL # BLD: 0.08 K/UL (ref 0.05–0.5)
EOSINOPHILS RELATIVE PERCENT: 1 % (ref 0–6)
ERYTHROCYTE [DISTWIDTH] IN BLOOD BY AUTOMATED COUNT: 20.2 % (ref 11.5–15)
GFR SERPL CREATININE-BSD FRML MDRD: 59 ML/MIN/1.73M2
GLUCOSE SERPL-MCNC: 93 MG/DL (ref 74–99)
HCT VFR BLD AUTO: 24 % (ref 34–48)
HELIOBACTER PYLORI ID: NEGATIVE
HGB BLD-MCNC: 7.5 G/DL (ref 11.5–15.5)
LYMPHOCYTES NFR BLD: 1.1 K/UL (ref 1.5–4)
LYMPHOCYTES RELATIVE PERCENT: 12 % (ref 20–42)
MCH RBC QN AUTO: 25.5 PG (ref 26–35)
MCHC RBC AUTO-ENTMCNC: 31.3 G/DL (ref 32–34.5)
MCV RBC AUTO: 81.6 FL (ref 80–99.9)
MONOCYTES NFR BLD: 1.72 K/UL (ref 0.1–0.95)
MONOCYTES NFR BLD: 19 % (ref 2–12)
NEUTROPHILS NFR BLD: 67 % (ref 43–80)
NEUTS SEG NFR BLD: 6.03 K/UL (ref 1.8–7.3)
NUCLEATED RED BLOOD CELLS: 3 PER 100 WBC
PLATELET # BLD AUTO: 536 K/UL (ref 130–450)
PMV BLD AUTO: 11 FL (ref 7–12)
POTASSIUM SERPL-SCNC: 4.4 MMOL/L (ref 3.5–5)
PROT SERPL-MCNC: 5.9 G/DL (ref 6.4–8.3)
RBC # BLD AUTO: 2.94 M/UL (ref 3.5–5.5)
RBC # BLD: ABNORMAL 10*6/UL
SODIUM SERPL-SCNC: 139 MMOL/L (ref 132–146)
SOURCE, 60200063: NORMAL
TRANSFUSION STATUS: NORMAL
UNIT DIVISION: 0
UNIT ISSUE DATE/TIME: NORMAL
WBC OTHER # BLD: 9 K/UL (ref 4.5–11.5)

## 2023-08-17 PROCEDURE — 6370000000 HC RX 637 (ALT 250 FOR IP): Performed by: INTERNAL MEDICINE

## 2023-08-17 PROCEDURE — 2060000000 HC ICU INTERMEDIATE R&B

## 2023-08-17 PROCEDURE — 82248 BILIRUBIN DIRECT: CPT

## 2023-08-17 PROCEDURE — 2580000003 HC RX 258: Performed by: ANESTHESIOLOGY

## 2023-08-17 PROCEDURE — 6360000002 HC RX W HCPCS: Performed by: INTERNAL MEDICINE

## 2023-08-17 PROCEDURE — C9113 INJ PANTOPRAZOLE SODIUM, VIA: HCPCS | Performed by: INTERNAL MEDICINE

## 2023-08-17 PROCEDURE — 80053 COMPREHEN METABOLIC PANEL: CPT

## 2023-08-17 PROCEDURE — A4216 STERILE WATER/SALINE, 10 ML: HCPCS | Performed by: INTERNAL MEDICINE

## 2023-08-17 PROCEDURE — 85025 COMPLETE CBC W/AUTO DIFF WBC: CPT

## 2023-08-17 PROCEDURE — 2580000003 HC RX 258: Performed by: INTERNAL MEDICINE

## 2023-08-17 PROCEDURE — 99233 SBSQ HOSP IP/OBS HIGH 50: CPT | Performed by: INTERNAL MEDICINE

## 2023-08-17 RX ORDER — PANTOPRAZOLE SODIUM 40 MG/1
40 TABLET, DELAYED RELEASE ORAL
Status: DISCONTINUED | OUTPATIENT
Start: 2023-08-17 | End: 2023-08-18 | Stop reason: HOSPADM

## 2023-08-17 RX ADMIN — SODIUM CHLORIDE, POTASSIUM CHLORIDE, SODIUM LACTATE AND CALCIUM CHLORIDE: 600; 310; 30; 20 INJECTION, SOLUTION INTRAVENOUS at 07:43

## 2023-08-17 RX ADMIN — SODIUM CHLORIDE 25 MG: 9 INJECTION, SOLUTION INTRAVENOUS at 09:25

## 2023-08-17 RX ADMIN — SODIUM CHLORIDE 40 MG: 9 INJECTION INTRAMUSCULAR; INTRAVENOUS; SUBCUTANEOUS at 00:37

## 2023-08-17 RX ADMIN — HYDROCODONE BITARTRATE AND ACETAMINOPHEN 1 TABLET: 5; 325 TABLET ORAL at 14:13

## 2023-08-17 RX ADMIN — PANTOPRAZOLE SODIUM 40 MG: 40 TABLET, DELAYED RELEASE ORAL at 20:47

## 2023-08-17 RX ADMIN — HYDROCODONE BITARTRATE AND ACETAMINOPHEN 1 TABLET: 5; 325 TABLET ORAL at 00:36

## 2023-08-17 RX ADMIN — HYDROCODONE BITARTRATE AND ACETAMINOPHEN 1 TABLET: 5; 325 TABLET ORAL at 07:42

## 2023-08-17 RX ADMIN — SODIUM CHLORIDE, PRESERVATIVE FREE 10 ML: 5 INJECTION INTRAVENOUS at 20:47

## 2023-08-17 RX ADMIN — DOCUSATE SODIUM 200 MG: 100 CAPSULE, LIQUID FILLED ORAL at 07:34

## 2023-08-17 RX ADMIN — HYDROCODONE BITARTRATE AND ACETAMINOPHEN 1 TABLET: 5; 325 TABLET ORAL at 20:46

## 2023-08-17 RX ADMIN — SODIUM CHLORIDE 100 MG: 9 INJECTION, SOLUTION INTRAVENOUS at 10:34

## 2023-08-17 RX ADMIN — LACTULOSE 20 G: 20 SOLUTION ORAL at 07:35

## 2023-08-17 RX ADMIN — FLUCONAZOLE 100 MG: 100 TABLET ORAL at 07:34

## 2023-08-17 ASSESSMENT — PAIN DESCRIPTION - ORIENTATION
ORIENTATION: LOWER
ORIENTATION: MID
ORIENTATION: LOWER

## 2023-08-17 ASSESSMENT — PAIN DESCRIPTION - LOCATION
LOCATION: BACK

## 2023-08-17 ASSESSMENT — PAIN SCALES - GENERAL
PAINLEVEL_OUTOF10: 4
PAINLEVEL_OUTOF10: 0
PAINLEVEL_OUTOF10: 8
PAINLEVEL_OUTOF10: 6
PAINLEVEL_OUTOF10: 8

## 2023-08-17 ASSESSMENT — PAIN DESCRIPTION - DESCRIPTORS
DESCRIPTORS: ACHING;DISCOMFORT
DESCRIPTORS: DISCOMFORT;ACHING

## 2023-08-17 ASSESSMENT — PAIN DESCRIPTION - FREQUENCY: FREQUENCY: INTERMITTENT

## 2023-08-17 ASSESSMENT — PAIN - FUNCTIONAL ASSESSMENT
PAIN_FUNCTIONAL_ASSESSMENT: PREVENTS OR INTERFERES WITH ALL ACTIVE AND SOME PASSIVE ACTIVITIES
PAIN_FUNCTIONAL_ASSESSMENT: ACTIVITIES ARE NOT PREVENTED

## 2023-08-17 ASSESSMENT — PAIN DESCRIPTION - ONSET: ONSET: ON-GOING

## 2023-08-17 NOTE — OP NOTE
[address]                  [address] [facility], [address] [place] [address]                                OPERATIVE REPORT    PATIENT NAME: Jordin Hernandez                     :        1950  MED REC NO:   [id number]                            ROOM:       7276  ACCOUNT NO:   [de-identified]                           ADMIT DATE: 08/15/2023  PROVIDER:     Lucas Jensen MD    DATE OF PROCEDURE:  2023    PROCEDURE PERFORMED:  Upper endoscopy with biopsy and Aaron  dilatation. PREOPERATIVE DIAGNOSES:  Evaluation for dysphagia and anemia and  heartburn. POSTOPERATIVE DIAGNOSES:  Candida esophagitis and mild peptic stricture,  dilated with a #52-Turkmen Aaron dilator. Stomach showed gastritis,  biopsied to rule out H. pylori infection. Duodenum biopsied to rule out  sprue. ANESTHESIA:  LMAC. NOTE:  Prior to the procedure an informed consent was obtained from the  patient after explaining the benefits as well as the risks,  alternatives, and complications of the procedure to the patient, who  understood and agreed. PROCEDURE:  With the patient in the left lateral decubitus position, the  Olympus GIF-100 forward-viewing videoscope was introduced into the  esophagus, the evaluation of which showed Candida esophagitis and mild  peptic stricture and no hiatal hernia was seen. The scope was then advanced through the gastroesophageal junction into  the gastric body, along the greater curvature. Evaluation of the body  of the stomach showed gastritis, biopsied to rule out H. pylori  infection. The scope was then advanced through the pylorus into the duodenal bulb  and second portion of the duodenum, both of which appeared to be  unremarkable. Duodenum biopsied to rule out sprue.   The scope was then  retrieved and retroflexed in the prepyloric antrum, with thorough  evaluation of the cardiac and fundal portions of the stomach, which  appeared to be within

## 2023-08-17 NOTE — CARE COORDINATION
Introduced my self and provided explanation of CM role to patient. Patient is awake, alert, and aware of current diagnosis and discharge planning. Patient lives in a two story home alone. Granddaughter does stay with her sometimes and she has the support of her children and other grandchildren. Patient has a walker and cane at home. PCP is Dr. Moriah Louie. Plan is to discharge home with no needs at this time. Ok to discharge from GI standpoint. IV Protonix switch to oral. Plan for discharge today. Patient will need followed and assisted with discharge planning as necessary.      Electronically signed by Fabiana Ha RN on 8/17/2023 at 9:53 AM

## 2023-08-17 NOTE — ACP (ADVANCE CARE PLANNING)
Advance Care Planning   Healthcare Decision Maker:    Primary Decision Maker: Gina Lo Child - [phone]    Secondary Decision Maker: Skinny De La Vega - Child - [phone]    Click here to complete Healthcare Decision Makers including selection of the Healthcare Decision Maker Relationship (ie \"Primary\"). Today we documented Decision Maker(s) consistent with Legal Next of Kin hierarchy.

## 2023-08-18 VITALS
DIASTOLIC BLOOD PRESSURE: 56 MMHG | BODY MASS INDEX: 28.21 KG/M2 | SYSTOLIC BLOOD PRESSURE: 144 MMHG | HEART RATE: 57 BPM | TEMPERATURE: 98.4 F | HEIGHT: 58 IN | RESPIRATION RATE: 16 BRPM | WEIGHT: 134.38 LBS | OXYGEN SATURATION: 100 %

## 2023-08-18 LAB
BASOPHILS # BLD: 0.07 K/UL (ref 0–0.2)
BASOPHILS NFR BLD: 1 % (ref 0–2)
EOSINOPHIL # BLD: 0.14 K/UL (ref 0.05–0.5)
EOSINOPHILS RELATIVE PERCENT: 2 % (ref 0–6)
ERYTHROCYTE [DISTWIDTH] IN BLOOD BY AUTOMATED COUNT: 20.5 % (ref 11.5–15)
HCT VFR BLD AUTO: 24.9 % (ref 34–48)
HGB BLD-MCNC: 7.7 G/DL (ref 11.5–15.5)
LYMPHOCYTES NFR BLD: 1.1 K/UL (ref 1.5–4)
LYMPHOCYTES RELATIVE PERCENT: 14 % (ref 20–42)
MCH RBC QN AUTO: 25.5 PG (ref 26–35)
MCHC RBC AUTO-ENTMCNC: 30.9 G/DL (ref 32–34.5)
MCV RBC AUTO: 82.5 FL (ref 80–99.9)
MONOCYTES NFR BLD: 1.17 K/UL (ref 0.1–0.95)
MONOCYTES NFR BLD: 15 % (ref 2–12)
MYELOCYTES ABSOLUTE COUNT: 0.07 K/UL
MYELOCYTES: 1 %
NEUTROPHILS NFR BLD: 68 % (ref 43–80)
NEUTS SEG NFR BLD: 5.36 K/UL (ref 1.8–7.3)
NUCLEATED RED BLOOD CELLS: 1 PER 100 WBC
PLATELET # BLD AUTO: 611 K/UL (ref 130–450)
PMV BLD AUTO: 10.9 FL (ref 7–12)
RBC # BLD AUTO: 3.02 M/UL (ref 3.5–5.5)
RBC # BLD: ABNORMAL 10*6/UL
WBC OTHER # BLD: 7.9 K/UL (ref 4.5–11.5)

## 2023-08-18 PROCEDURE — 6370000000 HC RX 637 (ALT 250 FOR IP): Performed by: INTERNAL MEDICINE

## 2023-08-18 PROCEDURE — 2580000003 HC RX 258: Performed by: ANESTHESIOLOGY

## 2023-08-18 PROCEDURE — 85025 COMPLETE CBC W/AUTO DIFF WBC: CPT

## 2023-08-18 PROCEDURE — 99239 HOSP IP/OBS DSCHRG MGMT >30: CPT | Performed by: INTERNAL MEDICINE

## 2023-08-18 PROCEDURE — 6360000002 HC RX W HCPCS: Performed by: INTERNAL MEDICINE

## 2023-08-18 PROCEDURE — 2580000003 HC RX 258: Performed by: INTERNAL MEDICINE

## 2023-08-18 RX ORDER — FERROUS SULFATE 325(65) MG
325 TABLET ORAL EVERY OTHER DAY
Qty: 180 TABLET | Refills: 1 | Status: SHIPPED | OUTPATIENT
Start: 2023-08-18

## 2023-08-18 RX ORDER — FLUCONAZOLE 100 MG/1
100 TABLET ORAL DAILY
Qty: 8 TABLET | Refills: 0 | Status: SHIPPED | OUTPATIENT
Start: 2023-08-18 | End: 2023-08-26

## 2023-08-18 RX ORDER — PANTOPRAZOLE SODIUM 40 MG/1
40 TABLET, DELAYED RELEASE ORAL
Qty: 30 TABLET | Refills: 3 | Status: SHIPPED | OUTPATIENT
Start: 2023-08-18

## 2023-08-18 RX ADMIN — HYDROCODONE BITARTRATE AND ACETAMINOPHEN 1 TABLET: 5; 325 TABLET ORAL at 02:45

## 2023-08-18 RX ADMIN — SODIUM CHLORIDE 125 MG: 900 INJECTION INTRAVENOUS at 07:37

## 2023-08-18 RX ADMIN — SODIUM CHLORIDE, PRESERVATIVE FREE 10 ML: 5 INJECTION INTRAVENOUS at 07:24

## 2023-08-18 RX ADMIN — PANTOPRAZOLE SODIUM 40 MG: 40 TABLET, DELAYED RELEASE ORAL at 05:45

## 2023-08-18 RX ADMIN — FLUCONAZOLE 100 MG: 100 TABLET ORAL at 07:22

## 2023-08-18 ASSESSMENT — PAIN DESCRIPTION - DESCRIPTORS: DESCRIPTORS: ACHING;DISCOMFORT

## 2023-08-18 ASSESSMENT — PAIN DESCRIPTION - LOCATION: LOCATION: BACK

## 2023-08-18 ASSESSMENT — PAIN - FUNCTIONAL ASSESSMENT: PAIN_FUNCTIONAL_ASSESSMENT: ACTIVITIES ARE NOT PREVENTED

## 2023-08-18 ASSESSMENT — PAIN DESCRIPTION - ORIENTATION: ORIENTATION: LOWER

## 2023-08-18 ASSESSMENT — PAIN SCALES - GENERAL
PAINLEVEL_OUTOF10: 0
PAINLEVEL_OUTOF10: 7

## 2023-08-18 NOTE — PLAN OF CARE
Problem: Pain  Goal: Verbalizes/displays adequate comfort level or baseline comfort level  8/17/2023 2152 by Prakash Rees RN  Outcome: Progressing

## 2023-08-18 NOTE — PLAN OF CARE
Problem: Discharge Planning  Goal: Discharge to home or other facility with appropriate resources  Outcome: Progressing     Problem: Safety - Adult  Goal: Free from fall injury  Outcome: Progressing     Problem: Pain  Goal: Verbalizes/displays adequate comfort level or baseline comfort level  8/18/2023 0801 by Owen Ace RN  Outcome: Progressing

## 2023-08-18 NOTE — DISCHARGE SUMMARY
[facility] Physician Discharge Summary       June Garcia MD  [address] [address] [place] [address] [id number]    Follow up      Carlos Knowles MD  [address] [address]  [phone]    Follow up        Activity level: As tolerated     Dispo: Home      Condition on discharge: Stable     Patient ID:  Mayo Mast  [id number]  81 y.o.  1950    Admit date: 8/15/2023    Discharge date and time:  8/18/2023  7:22 AM    Admission Diagnoses: Principal Problem:    Acute on chronic anemia  Active Problems:    GI bleed    Pre-syncope    Peptic stricture of esophagus    Candida esophagitis (HCC)    Symptomatic anemia  Resolved Problems:    * No resolved hospital problems. *      Discharge Diagnoses: Principal Problem:    Acute on chronic anemia  Active Problems:    GI bleed    Pre-syncope    Peptic stricture of esophagus    Candida esophagitis (HCC)    Symptomatic anemia  Resolved Problems:    * No resolved hospital problems. *      Consults:  IP CONSULT TO GI    Hospital Course:   Patient Mayo Mast is a 81 y.o. presented with Symptomatic anemia [D64.9]  GI bleed [K92.2]    Ms. Anita Carey is a 68-year-old female with past medical history significant for rheumatoid arthritis, hypertension, hyperlipidemia, TIA who presents with lightheadedness and dizziness episodes as well as abnormal lab work. In talking with Ms. Chante Serra, she reports she has had 2-3 episodes of lightheadedness and dizziness when standing recently. She was recently cooking, standing in her kitchen when she had sudden onset of lightheadedness and dizziness. She had to grab onto something to hold onto so she did not fall. She denies blood in her stool or dark tarry stools. She told her PCP regarding these episodes and lab work was obtained, she was found to be anemic. She was referred to the [facility] ED for further evaluation.      In the ED, vitals on presentation drug: Tofacitinib Citrate            STOP taking these medications      Abatacept 125 MG/ML Sosy     bisacodyl 5 MG EC tablet  Commonly known as: DULCOLAX     famotidine 20 MG tablet  Commonly known as: PEPCID     Humira Pen 40 MG/0.8ML injection  Generic drug: adalimumab     methocarbamol 750 MG tablet  Commonly known as: ROBAXIN     Prenatal Plus 27-1 MG Tabs     Prenate Mini 18-0.6-0.4-350 MG Caps     raNITIdine 150 MG tablet  Commonly known as: ZANTAC     sucralfate 1 GM/10ML suspension  Commonly known as: CARAFATE               Where to Get Your Medications        These medications were sent to [id number] [place] [address], [address] [facility] [address] Jeanna Richter [phone] Yessica Peng [phone] [address] [facility] [place] [facility] [address]      Phone: [phone]   ferrous sulfate 325 (65 Fe) MG tablet  fluconazole 100 MG tablet  pantoprazole 40 MG tablet           Note that more than 30 minutes was spent in preparing discharge papers, discussing discharge with patient, medication review, etc.    Signed:  Electronically signed by Chloe Buchanan MD on 8/18/2023 at 7:22 AM

## 2023-08-18 NOTE — PROGRESS NOTES
Physician Progress Note      Ham Lew  CSN #:                  [id number]  :                       1950  ADMIT DATE:       8/15/2023 6:53 PM  [address] [facility] DATE:  RESPONDING  PROVIDER #:        Diann Cruz MD          QUERY TEXT:    Dear Attending Physician,    Patient admitted with possible GI bleeding, noted to have Iron deficiency   anemia. If possible, please document in progress notes and discharge summary   the cause of the GI bleeding or was GI bleeding ruled out: The medical record reflects the following:  Risk Factors: Iron deficiency anemia  Clinical Indicators: Per PCP 8/15,\". Pepe Gaffney Acute on chronic microcytic   anemia-transfuse 1 unit PRBCs for hemoglobin 6.6, Hg stable today. 2/2 WAI,   replacing. Pepe Gaffney Presyncope-patient to receive 1 unit PRBC transfusion for   hemoglobin 6.6, resolved after IVF. Regular diet, encourage fluid intake, can   d/c IVF. ePpe Gaffney Concern for GI bleed. ..oral Protonix 40 mg every 12 hours, EGD   performed 2023. Pepe Gaffney Thrombocytosis- stable. .. likely 2/2 iron deficiency   anemia. Pepe Gaffney \"  Per EGD ,\". .. Candida esophagitis and mild peptic stricture,   dilated with a #52-Faroese Aaron dilator. Stomach showed gastritis . Treatment: Protonix, IV Iron, 1 unit prbc's    Thank you,  Sonja Villalpando RN CCDS  Clinical Documentation Improvement Specialist  Options provided:  -- GI bleeding due to gastritis  -- GI bleeding due to other, Please specify the cause of the GIB. -- GI bleeding was ruled out after further study  -- Other - I will add my own diagnosis  -- Disagree - Not applicable / Not valid  -- Disagree - Clinically unable to determine / Unknown  -- Refer to Clinical Documentation Reviewer    PROVIDER RESPONSE TEXT:    This patient has GI bleeding due to gastritis.     Query created by: Sonja Villalpando on 2023 3:43 PM      Electronically signed by:  Diann Cruz MD 2023 8:11 AM

## 2023-08-18 NOTE — DISCHARGE INSTRUCTIONS
Discharge to:  home  Diet: regular  Activity: activity as tolerated   Exercise: As tolerated     Be compliant with medication  Complete Diflucan 100 mg daily X 8 days   Continue Protonix 40mg twice a day  Further evaluation as OP to assess liver cysts, CT results noted from 2021

## 2023-08-21 LAB — SURGICAL PATHOLOGY REPORT: NORMAL

## 2023-10-20 ENCOUNTER — HOSPITAL ENCOUNTER (OUTPATIENT)
Dept: PHYSICAL THERAPY | Age: 73
Setting detail: THERAPIES SERIES
Discharge: HOME | End: 2023-10-20
Payer: COMMERCIAL

## 2023-10-20 PROCEDURE — 97161 PT EVAL LOW COMPLEX 20 MIN: CPT | Performed by: PHYSICAL THERAPIST

## 2023-10-20 NOTE — PROGRESS NOTES
[address]                Phone: [phone]   Fax: [phone]    Physical Therapy Daily Treatment Note  Date:  10/20/2023    Patient Name:  Chris Maldonado    :  1950  MRN: [id number]    Evaluating therapist:  JOSE Mercado                    (10/20/23)  Restrictions/Precautions:    Diagnosis:  s/p R SLAVA  Treatment Diagnosis:    Insurance/Certification information:  [address]  Referring Physician:  Bogdan Larkin. Plan of care signed (Y/N):    Visit# / total visits:    Pain level: 0/10   Time In:  Time Out:    Subjective:      Exercises:  Exercise/Equipment Resistance/Repetitions Other comments   StepOne             seated hip add                       abd                       flex                 knee ext            sit/stand            toe raises     step ups             side            side/side amb                                           Other Therapeutic Activities:      Home Exercise Program:  provided 10/20/23    Manual Treatments:      Modalities:      Timed Code Treatment Minutes: Total Treatment Minutes:      Treatment/Activity Tolerance:  [] Patient tolerated treatment well [] Patient limited by fatique  [] Patient limited by pain  [] Patient limited by other medical complications  [] Other:     Prognosis: [] Good [] Fair  [] Poor    Patient Requires Follow-up: [] Yes  [] No    Plan:   [] Continue per plan of care [] Alter current plan (see comments)  [] Plan of care initiated [] Hold pending MD visit [] Discharge  Plan for Next Session:      See Weekly Progress Note: []  Yes  []  No  Next due:        Electronically signed by:   Primo Sloan PT

## 2023-10-20 NOTE — PROGRESS NOTES
Physical Therapy: Initial Evaluation    Patient:  Jennifer Skaggs (7 y.o. female)   Examination Date:   Plan of Care Certification Period: 10/20/2023 to        :  1950 ;    Confirmed: Yes MRN: [id number]  CSN: [id number]   Insurance: Payor: Jef Gibson / Plan: Maxine Pay / Product Type: *No Product type* /   Insurance ID: [id number] - (Medicaid Managed) Secondary Insurance (if applicable):    Referring Physician: Ronel Garcia MD     PCP: Abhishek Beckman MD Visits to Date/Visits Approved:     No Show/Cancelled Appts:   /       Medical Diagnosis: History of total hip arthroplasty, right [Z96.641]    Treatment Diagnosis:       PERTINENT MEDICAL HISTORY           Medical History: Chart Reviewed: Yes   Past Medical History:   Diagnosis Date    Acid reflux     Analgesic rebound headache 2019    Chronic back pain     Constipation     Headache(784.0)     History of sinus problem     Hyperlipidemia     Hypertension     Memory loss, short term     Neuropathy     Osteoarthritis     Pain in neck     Rheumatoid arthritis(714.0)     Dr. Katiuska Stovall in ears     TIA (transient ischemic attack)      Surgical History:   Past Surgical History:   Procedure Laterality Date    CARDIOVASCULAR STRESS TEST      normal    COLONOSCOPY      Normal.  Re-referred for diagnostic on 9/10    HYSTERECTOMY (CERVIX STATUS UNKNOWN)      SHOULDER SURGERY Left     SHOULDER SURGERY Right     TOOTH EXTRACTION Right 2014    TUBAL LIGATION          UPPER GASTROINTESTINAL ENDOSCOPY N/A 2023    EGD ESOPHAGOGASTRODUODENOSCOPY DILATATION performed by Aroldo Mustafa MD at [facility] ENDOSCOPY       Medications:   Current Outpatient Medications:     ferrous sulfate (IRON 325) 325 (65 Fe) MG tablet, Take 1 tablet by mouth every other day, Disp: 180 tablet, Rfl: 1    pantoprazole (PROTONIX) 40 MG tablet, Take 1 tablet by mouth 2 times daily (before meals), Disp: 30 tablet, Rfl: 3    XELJANZ 5 MG

## 2023-10-25 ENCOUNTER — HOSPITAL ENCOUNTER (OUTPATIENT)
Dept: PHYSICAL THERAPY | Age: 73
Setting detail: THERAPIES SERIES
Discharge: HOME | End: 2023-10-25
Payer: COMMERCIAL

## 2023-10-25 PROCEDURE — 97110 THERAPEUTIC EXERCISES: CPT | Performed by: PHYSICAL THERAPIST

## 2023-10-25 PROCEDURE — 97530 THERAPEUTIC ACTIVITIES: CPT | Performed by: PHYSICAL THERAPIST

## 2023-10-25 NOTE — PROGRESS NOTES
[address]                Phone: [phone]   Fax: [phone]    Physical Therapy Daily Treatment Note  Date:  10/25/2023    Patient Name:  Migel Davalos    :  1950  MRN: [id number]    Evaluating therapist:  JOSE Cortez                    (10/20/23)  Restrictions/Precautions:    Diagnosis:  s/p R SLAVA  Treatment Diagnosis:    Insurance/Certification information:  [address]  Referring Physician:  Danyelle Sprague. Plan of care signed (Y/N):    Visit# / total visits:  2  Pain level: 0/10   Time In:  1013  Time Out:  1056    Subjective:      Exercises:  Exercise/Equipment Resistance/Repetitions Other comments   StepOne  10 min            seated hip add [id number]                      abd [id number]                      flex [id number]                knee ext [id number]           sit/stand 2x10           toe raises 2x15    step ups 2x10x6\"            side 2x10x6\"           side/side amb  2 min                                          Other Therapeutic Activities:      Home Exercise Program:  provided 10/20/23    Manual Treatments:      Modalities:      Timed Code Treatment Minutes: Total Treatment Minutes:      Treatment/Activity Tolerance:  [] Patient tolerated treatment well [] Patient limited by fatique  [] Patient limited by pain  [] Patient limited by other medical complications  [] Other:     Prognosis: [] Good [] Fair  [] Poor    Patient Requires Follow-up: [] Yes  [] No    Plan:   [] Continue per plan of care [] Alter current plan (see comments)  [] Plan of care initiated [] Hold pending MD visit [] Discharge  Plan for Next Session:      See Weekly Progress Note: []  Yes  []  No  Next due:        Electronically signed by:   Naomy Gamez PT

## 2023-10-27 ENCOUNTER — HOSPITAL ENCOUNTER (OUTPATIENT)
Dept: PHYSICAL THERAPY | Age: 73
Setting detail: THERAPIES SERIES
Discharge: HOME | End: 2023-10-27
Payer: COMMERCIAL

## 2023-10-27 PROCEDURE — 97530 THERAPEUTIC ACTIVITIES: CPT | Performed by: PHYSICAL THERAPIST

## 2023-10-27 PROCEDURE — 97110 THERAPEUTIC EXERCISES: CPT | Performed by: PHYSICAL THERAPIST

## 2023-10-27 NOTE — PROGRESS NOTES
[address]                Phone: [phone]   Fax: [phone]    Physical Therapy Daily Treatment Note  Date:  10/27/2023    Patient Name:  Sigifredo Ley    :  1950  MRN: [id number]    Evaluating therapist:  JOSE Muniz                    (10/20/23)  Restrictions/Precautions:    Diagnosis:  s/p R SLAVA  Treatment Diagnosis:    Insurance/Certification information:  [address]  Referring Physician:  Cassie Garcia Plan of care signed (Y/N):    Visit# / total visits:  3/8  Pain level: 0/10   Time In:  955  Time Out:  1041    Subjective:      Exercises:  Exercise/Equipment Resistance/Repetitions Other comments   StepOne  10 min            seated hip add [id number]                      abd [id number]                      flex [id number]                knee ext [id number]           sit/stand 2x10           toe raises 2x15    step ups 2x10x6\"            side 2x10x6\"           side/side amb  3 min                                          Other Therapeutic Activities:      Home Exercise Program:  provided 10/20/23    Manual Treatments:      Modalities:      Timed Code Treatment Minutes: Total Treatment Minutes:      Treatment/Activity Tolerance:  [] Patient tolerated treatment well [] Patient limited by fatique  [] Patient limited by pain  [] Patient limited by other medical complications  [] Other:     Prognosis: [] Good [] Fair  [] Poor    Patient Requires Follow-up: [] Yes  [] No    Plan:   [] Continue per plan of care [] Alter current plan (see comments)  [] Plan of care initiated [] Hold pending MD visit [] Discharge  Plan for Next Session:      See Weekly Progress Note: []  Yes  []  No  Next due:        Electronically signed by:   Tatiana Fink, PT

## 2023-10-27 NOTE — PROGRESS NOTES
S:  pt presents to therapy for two of two scheduled visits this week; at week's end she reports that her R hip is doing well and she is getting around better; continues to report little to no pain across LE;  no c/o  buckling or LOB over last week's time; HEP going well per pt    O:  performed the exercises/tasks as written in the flowsheet for the week ending 10/27/23; initiated HEP for home management of condition; L LE strength grossly 4, 4+/5 for all ranges    A:  jermaine tx well; pt able to peform all requested tasks with good form and pacing noted; R hip strength shows improvement across all ranges; gait is slow/guarded but I with NORMAL mechanics noted B LE's/trunk; static/dynamic balance is GOOD; endurance for all prolonged activities is GOOD    P:  cont with POC of strengthening/balance/endurance activities for L hip as pt tolerates

## 2023-11-01 ENCOUNTER — HOSPITAL ENCOUNTER (OUTPATIENT)
Dept: PHYSICAL THERAPY | Age: 73
Setting detail: THERAPIES SERIES
Discharge: HOME | End: 2023-11-01
Payer: COMMERCIAL

## 2023-11-01 PROCEDURE — 97530 THERAPEUTIC ACTIVITIES: CPT | Performed by: PHYSICAL THERAPIST

## 2023-11-01 PROCEDURE — 97110 THERAPEUTIC EXERCISES: CPT | Performed by: PHYSICAL THERAPIST

## 2023-11-01 NOTE — PROGRESS NOTES
[address]                Phone: [phone]   Fax: [phone]    Physical Therapy Daily Treatment Note  Date:  2023    Patient Name:  Thom Davis    :  1950  MRN: [id number]    Evaluating therapist:  JOSE Sloan                    (10/20/23)  Restrictions/Precautions:    Diagnosis:  s/p R SLAVA  Treatment Diagnosis:    Insurance/Certification information:  [address]  Referring Physician:  Flora Franco. Plan of care signed (Y/N):    Visit# / total visits:    Pain level: 0/10   Time In:  1004  Time Out:  1052    Subjective:      Exercises:  Exercise/Equipment Resistance/Repetitions Other comments   StepOne  10 min            seated hip add 2c54s2w                      abd 4r75vrv                      flex 0o41b1uv                knee ext 4o31r9cu           sit/stand 2x10           toe raises 2x15    step ups 2x10x6\"            side 2x10x6\"           side/side amb  3 min                                          Other Therapeutic Activities:      Home Exercise Program:  provided 10/20/23    Manual Treatments:      Modalities:      Timed Code Treatment Minutes: Total Treatment Minutes:      Treatment/Activity Tolerance:  [] Patient tolerated treatment well [] Patient limited by fatique  [] Patient limited by pain  [] Patient limited by other medical complications  [] Other:     Prognosis: [] Good [] Fair  [] Poor    Patient Requires Follow-up: [] Yes  [] No    Plan:   [] Continue per plan of care [] Alter current plan (see comments)  [] Plan of care initiated [] Hold pending MD visit [] Discharge  Plan for Next Session:      See Weekly Progress Note: []  Yes  []  No  Next due:        Electronically signed by:   Ansley Witt PT

## 2023-11-03 ENCOUNTER — HOSPITAL ENCOUNTER (OUTPATIENT)
Dept: PHYSICAL THERAPY | Age: 73
Setting detail: THERAPIES SERIES
Discharge: HOME | End: 2023-11-03
Payer: COMMERCIAL

## 2023-11-03 NOTE — PROGRESS NOTES
[address]                Phone: [phone]  Fax: [phone]    Physical Therapy  Cancellation/No-show Note  Patient Name:  Michael Landers  :  1950   Date:  11/3/2023    For today's appointment patient:  [x]  Cancelled  []  Rescheduled appointment  []  No-show     Reason given by patient:  []  Patient ill  []  Conflicting appointment  []  No transportation    []  Conflict with work  [x]  No reason given  []  Other:     Comments:      Electronically signed by:   Sigifredo Urbina PT

## 2023-11-03 NOTE — PROGRESS NOTES
S:  pt presents to therapy for one of two scheduled visits this week; at week's end she reports that her R hip is doing well and she is getting around better; continues to report little to no pain across LE;  no c/o  buckling or LOB over last week's time; HEP going well per pt    O:  performed the exercises/tasks as written in the flowsheet for the week ending 11/3/23;  L LE strength grossly 4, 4+/5 for all ranges    A:  jermaine tx well; pt able to peform all requested tasks with good form and pacing noted; R hip strength shows improvement across all ranges; gait is slow/guarded but I with NORMAL mechanics noted B LE's/trunk; static/dynamic balance is GOOD; endurance for all prolonged activities is GOOD    P:  cont with POC of strengthening/balance/endurance activities for L hip as pt tolerates

## 2023-11-08 ENCOUNTER — HOSPITAL ENCOUNTER (OUTPATIENT)
Dept: PHYSICAL THERAPY | Age: 73
Setting detail: THERAPIES SERIES
Discharge: HOME | End: 2023-11-08
Payer: COMMERCIAL

## 2023-11-08 PROCEDURE — 97110 THERAPEUTIC EXERCISES: CPT | Performed by: PHYSICAL THERAPIST

## 2023-11-08 PROCEDURE — 97530 THERAPEUTIC ACTIVITIES: CPT | Performed by: PHYSICAL THERAPIST

## 2023-11-08 NOTE — PROGRESS NOTES
[address]                Phone: [phone]   Fax: [phone]    Physical Therapy Daily Treatment Note  Date:  2023    Patient Name:  Kirsty Cam    :  1950  MRN: [id number]    Evaluating therapist:  JOSE Ramos                    (10/20/23)  Restrictions/Precautions:    Diagnosis:  s/p R SLAVA  Treatment Diagnosis:    Insurance/Certification information:  [address]  Referring Physician:  Hilaria Cohen. Plan of care signed (Y/N):    Visit# / total visits:    Pain level: 0/10   Time In:  1006  Time Out:  1050    Subjective:      Exercises:  Exercise/Equipment Resistance/Repetitions Other comments   StepOne  10 min            seated hip add 5x37j3h                      abd 5t80uzb                      flex 9a82d3uy                knee ext 5c72d6dl           sit/stand 2x10           toe raises 2x15    step ups 2x10x6\"            side 2x10x6\"           side/side amb  NT due to fatigue                                           Other Therapeutic Activities:      Home Exercise Program:  provided 10/20/23    Manual Treatments:      Modalities:      Timed Code Treatment Minutes: Total Treatment Minutes:      Treatment/Activity Tolerance:  [] Patient tolerated treatment well [] Patient limited by fatique  [] Patient limited by pain  [] Patient limited by other medical complications  [] Other:     Prognosis: [] Good [] Fair  [] Poor    Patient Requires Follow-up: [] Yes  [] No    Plan:   [] Continue per plan of care [] Alter current plan (see comments)  [] Plan of care initiated [] Hold pending MD visit [] Discharge  Plan for Next Session:      See Weekly Progress Note: []  Yes  []  No  Next due:        Electronically signed by:   Mandy Francisco PT

## 2023-11-10 ENCOUNTER — HOSPITAL ENCOUNTER (OUTPATIENT)
Dept: PHYSICAL THERAPY | Age: 73
Setting detail: THERAPIES SERIES
Discharge: HOME | End: 2023-11-10
Payer: COMMERCIAL

## 2023-11-10 PROCEDURE — 97110 THERAPEUTIC EXERCISES: CPT | Performed by: PHYSICAL THERAPIST

## 2023-11-10 PROCEDURE — 97530 THERAPEUTIC ACTIVITIES: CPT | Performed by: PHYSICAL THERAPIST

## 2023-11-10 NOTE — PROGRESS NOTES
[address]                Phone: [phone]   Fax: [phone]    Physical Therapy Daily Treatment Note  Date:  11/10/2023    Patient Name:  Beverly Snowden    :  1950  MRN: [id number]    Evaluating therapist:  JOSE Lott                    (10/20/23)  Restrictions/Precautions:    Diagnosis:  s/p R SLAVA  Treatment Diagnosis:    Insurance/Certification information:  [address]  Referring Physician:  Izzy Casey. Plan of care signed (Y/N):    Visit# / total visits:    Pain level: 0/10   Time In:  1000  Time Out:  1053    Subjective:      Exercises:  Exercise/Equipment Resistance/Repetitions Other comments   StepOne  10 min            seated hip add 4r95p3b                      abd 9u75bntec                      flex 4b31f1bj                knee ext 5y83t1zt           sit/stand 2x10           toe raises 2x15    step ups 2x10x6\"            side 2x10x6\"           side/side amb  3 min                                            Other Therapeutic Activities:      Home Exercise Program:  provided 10/20/23    Manual Treatments:      Modalities:      Timed Code Treatment Minutes: Total Treatment Minutes:      Treatment/Activity Tolerance:  [] Patient tolerated treatment well [] Patient limited by fatique  [] Patient limited by pain  [] Patient limited by other medical complications  [] Other:     Prognosis: [] Good [] Fair  [] Poor    Patient Requires Follow-up: [] Yes  [] No    Plan:   [] Continue per plan of care [] Alter current plan (see comments)  [] Plan of care initiated [] Hold pending MD visit [] Discharge  Plan for Next Session:      See Weekly Progress Note: []  Yes  []  No  Next due:        Electronically signed by:   Marybel Morales PT

## 2023-11-10 NOTE — PROGRESS NOTES
S:  pt presents to therapy for two of two scheduled visits this week; at week's end she reports that her R hip is doing well and she is getting around better; continues to report little to no pain across LE and LB at level of 4/10;   no c/o  buckling or LOB over last week's time; HEP going well per pt    O:  performed the exercises/tasks as written in the flowsheet for the week ending 11/10/23;  L LE strength grossly 4, 4+/5 for all ranges    A:  jermaine tx well; pt able to peform all requested tasks with good form and pacing noted; required some extended rest periods due to fatigue and aching; R hip strength stable across all ranges since last week; gait is slow/guarded but I with NORMAL mechanics noted B LE's/trunk with no asst device; static/dynamic balance is GOOD; endurance for all prolonged activities is GOOD    P:  cont with POC of strengthening/balance/endurance activities for L hip as pt tolerates

## 2023-11-15 ENCOUNTER — HOSPITAL ENCOUNTER (OUTPATIENT)
Dept: PHYSICAL THERAPY | Age: 73
Setting detail: THERAPIES SERIES
Discharge: HOME | End: 2023-11-15
Payer: COMMERCIAL

## 2023-11-15 NOTE — PROGRESS NOTES
[address]                Phone: [phone]  Fax: [phone]    Physical Therapy  Cancellation/No-show Note  Patient Name:  Tj Varma  :  1950   Date:  11/15/2023    For today's appointment patient:  [x]  Cancelled  []  Rescheduled appointment  []  No-show     Reason given by patient:  [x]  Patient ill  []  Conflicting appointment  []  No transportation    []  Conflict with work  []  No reason given  []  Other:     Comments:      Electronically signed by:   Albino Chaparro, PT

## 2023-11-17 ENCOUNTER — HOSPITAL ENCOUNTER (OUTPATIENT)
Dept: PHYSICAL THERAPY | Age: 73
Setting detail: THERAPIES SERIES
Discharge: HOME | End: 2023-11-17
Payer: COMMERCIAL

## 2023-11-17 NOTE — PROGRESS NOTES
S:  pt presents to therapy for two of two scheduled visits this week; at week's end she reports that her R hip is aching today hampering movement/walking to a degree; pain level given at 4/10 and is limiting at times;   no c/o  buckling or LOB over last week's time; HEP going well per pt    O:  performed the exercises/tasks as written in the flowsheet for the week ending 11/17/23;  L LE strength grossly 4, 4+/5 for all ranges    A:  jermaine tx well; pt able to peform all requested tasks with good form and pacing noted; required some extended rest periods due to fatigue and aching; R hip strength stable across all ranges since last week; gait is slow/guarded but I with NORMAL mechanics noted B LE's/trunk with no asst device; static/dynamic balance is GOOD; endurance for all prolonged activities is GOOD    P:  cont with POC of strengthening/balance/endurance activities for L hip as pt tolerates

## 2023-11-17 NOTE — PROGRESS NOTES
[address]                Phone: [phone]   Fax: [phone]    Physical Therapy Daily Treatment Note  Date:  2023    Patient Name:  Bobby Pastrana    :  1950  MRN: [id number]    Evaluating therapist:  Karolynn Cushing, MPT                    (10/20/23)  Restrictions/Precautions:    Diagnosis:  s/p R SLAVA  Treatment Diagnosis:    Insurance/Certification information:  [address]  Referring Physician:  Katerina Lorenzo. Plan of care signed (Y/N):    Visit# / total visits:    Pain level: 0/10   Time In:  1011  Time Out:  1055    Subjective:      Exercises:  Exercise/Equipment Resistance/Repetitions Other comments   StepOne  10 min            seated hip add [id number]                      abd 2v47axvxw                      flex 2e59n4os                knee ext 5e23d1eu           sit/stand 2x10           toe raises 2x15    step ups 2x10x6\"            side 2x10x6\"           side/side amb  3 min                                            Other Therapeutic Activities:      Home Exercise Program:  provided 10/20/23    Manual Treatments:      Modalities:      Timed Code Treatment Minutes: Total Treatment Minutes:      Treatment/Activity Tolerance:  [] Patient tolerated treatment well [] Patient limited by fatique  [] Patient limited by pain  [] Patient limited by other medical complications  [] Other:     Prognosis: [] Good [] Fair  [] Poor    Patient Requires Follow-up: [] Yes  [] No    Plan:   [] Continue per plan of care [] Alter current plan (see comments)  [] Plan of care initiated [] Hold pending MD visit [] Discharge  Plan for Next Session:      See Weekly Progress Note: []  Yes  []  No  Next due:        Electronically signed by:   Foster Stanley PT

## 2023-11-22 ENCOUNTER — HOSPITAL ENCOUNTER (OUTPATIENT)
Dept: PHYSICAL THERAPY | Age: 73
Setting detail: THERAPIES SERIES
Discharge: HOME | End: 2023-11-22
Payer: COMMERCIAL

## 2023-11-22 PROCEDURE — 97110 THERAPEUTIC EXERCISES: CPT | Performed by: PHYSICAL THERAPIST

## 2023-11-22 PROCEDURE — 97530 THERAPEUTIC ACTIVITIES: CPT | Performed by: PHYSICAL THERAPIST

## 2023-11-22 NOTE — PROGRESS NOTES
[address]                Phone: [phone]   Fax: [phone]    Physical Therapy Daily Treatment Note  Date:  2023    Patient Name:  Adilia Hernandez    :  1950  MRN: [id number]    Evaluating therapist:  JOSE Craig                    (10/20/23)  Restrictions/Precautions:    Diagnosis:  s/p R SLAVA  Treatment Diagnosis:    Insurance/Certification information:  [address]  Referring Physician:  Hawa Baca. Plan of care signed (Y/N):    Visit# / total visits:    Pain level: 0/10   Time In:  959  Time Out:  1040    Subjective:      Exercises:  Exercise/Equipment Resistance/Repetitions Other comments   StepOne  10 min            seated hip add 8m68x2q                      abd 9b02ugmyh                      flex 5v44f9nv                knee ext 5c34q8ay           sit/stand 2x10           toe raises 2x15    step ups 2x10x6\"            side 2x10x6\"           side/side amb  3 min                                            Other Therapeutic Activities:      Home Exercise Program:  provided 10/20/23    Manual Treatments:      Modalities:      Timed Code Treatment Minutes: Total Treatment Minutes:      Treatment/Activity Tolerance:  [] Patient tolerated treatment well [] Patient limited by fatique  [] Patient limited by pain  [] Patient limited by other medical complications  [] Other:     Prognosis: [] Good [] Fair  [] Poor    Patient Requires Follow-up: [] Yes  [] No    Plan:   [] Continue per plan of care [] Alter current plan (see comments)  [] Plan of care initiated [] Hold pending MD visit [] Discharge  Plan for Next Session:      See Weekly Progress Note: []  Yes  []  No  Next due:        Electronically signed by:   Juan العلي PT

## 2024-05-29 ENCOUNTER — HOSPITAL ENCOUNTER (EMERGENCY)
Age: 74
Discharge: HOME | End: 2024-05-29
Payer: COMMERCIAL

## 2024-05-29 VITALS
HEART RATE: 76 BPM | TEMPERATURE: 98.2 F | SYSTOLIC BLOOD PRESSURE: 137 MMHG | DIASTOLIC BLOOD PRESSURE: 66 MMHG | RESPIRATION RATE: 16 BRPM | OXYGEN SATURATION: 97 %

## 2024-05-29 DIAGNOSIS — H92.01 RIGHT EAR PAIN: Primary | ICD-10-CM

## 2024-05-29 DIAGNOSIS — T16.1XXA FOREIGN BODY OF RIGHT EAR, INITIAL ENCOUNTER: ICD-10-CM

## 2024-05-29 PROCEDURE — 99282 EMERGENCY DEPT VISIT SF MDM: CPT

## 2024-05-29 ASSESSMENT — PAIN SCALES - GENERAL: PAINLEVEL_OUTOF10: 5

## 2024-05-29 ASSESSMENT — PAIN - FUNCTIONAL ASSESSMENT: PAIN_FUNCTIONAL_ASSESSMENT: 0-10

## 2024-05-29 ASSESSMENT — LIFESTYLE VARIABLES
HOW MANY STANDARD DRINKS CONTAINING ALCOHOL DO YOU HAVE ON A TYPICAL DAY: PATIENT DOES NOT DRINK
HOW OFTEN DO YOU HAVE A DRINK CONTAINING ALCOHOL: NEVER

## 2024-05-29 ASSESSMENT — PAIN DESCRIPTION - ORIENTATION: ORIENTATION: RIGHT

## 2024-05-29 ASSESSMENT — PAIN DESCRIPTION - LOCATION: LOCATION: EAR

## 2024-05-29 NOTE — DISCHARGE INSTR - COC
Continuity of Care Form    Patient Name: Clau De La Vega   :  1950  MRN:  [id number]    Admit date:  2024  Discharge date:  ***    Code Status Order: Prior   Advance Directives:     Admitting Physician:  No admitting provider for patient encounter.  PCP: Deyvi Lewis MD    Discharging Nurse: ***  Discharging Hospital Unit/Room#: HALL03/BARR-03  Discharging Unit Phone Number: ***    Emergency Contact:   Extended Emergency Contact Information  Primary Emergency Contact: Skinny De La Vega   [facility]  Home Phone: [phone]  Relation: Child  Secondary Emergency Contact: Steve De La Vega [facility]  Home Phone: [phone]  Mobile Phone: [phone]  Relation: Child    Past Surgical History:  Past Surgical History:   Procedure Laterality Date    CARDIOVASCULAR STRESS TEST      normal    COLONOSCOPY      Normal.  Re-referred for diagnostic on 9/10    HYSTERECTOMY (CERVIX STATUS UNKNOWN)      SHOULDER SURGERY Left     SHOULDER SURGERY Right     TOOTH EXTRACTION Right 2014    TUBAL LIGATION          UPPER GASTROINTESTINAL ENDOSCOPY N/A 2023    EGD ESOPHAGOGASTRODUODENOSCOPY DILATATION performed by Estrella Ruiz MD at [facility] ENDOSCOPY       Immunization History:   Immunization History   Administered Date(s) Administered    COVID-19, PFIZER Bivalent, DO NOT Dilute, (age 12y+), IM, 30 mcg/0.3 mL 10/13/2022    COVID-19, PFIZER PURPLE top, DILUTE for use, (age 12 y+), 30mcg/0.3mL 2021, 2021, 2021    COVID-19, PFIZER, (- formula), (age 12y+), IM, 30mcg/0.3mL 10/31/2023    Influenza 10/11/2012    Pneumococcal, PPSV23, PNEUMOVAX 23, (age 2y+), SC/IM, 0.5mL 2011    TDaP, ADACEL (age 10y-64y), BOOSTRIX (age 10y+), IM, 0.5mL 2011       Active Problems:  Patient Active Problem List   Diagnosis Code    GERD (gastroesophageal reflux disease) K21.9    Cervical radiculopathy, chronic M54.12    Hyperlipidemia LDL goal <100 E78.5    Essential

## 2024-05-29 NOTE — ED PROVIDER NOTES
Independent GORAN Visit.           [facility] EMERGENCY DEPARTMENT  ED  Encounter Note  Admit Date/RoomTime: 2024  1:10 PM  ED Room: [facility] [address]  NAME: Clau De La Vega  : 1950  MRN: [id number]  PCP: Deyvi Lewis MD    CHIEF COMPLAINT     Foreign Body in Ear (cotton stuck in right ear, urgent care attempted to get it out but unable so sent her here)    HISTORY OF PRESENT ILLNESS        Clau De La Vega is a 73 y.o. female who presents to the ED by private vehicle for concern for FB right ear, beginning a few day(s) ago. The complaint has been persistent and are mild in severity.  The patient states that she cleaned the ears on Memorial Day weekend and thought that a piece of the cotton remained in her ear canal.  She states that her sister was able to get some of it but felt that there was still some residual foreign matter.  She went to a local urgent care today and was told that they could see something but were not able to remove it with their tweezers.  The patient was directed to the emergency room.   No drainage or significant pain reported      REVIEW OF SYSTEMS     Pertinent positives and negatives are stated within HPI, all other systems reviewed and are negative.    Past Medical History:  has a past medical history of Acid reflux, Analgesic rebound headache, Chronic back pain, Constipation, Headache(784.0), History of sinus problem, Hyperlipidemia, Hypertension, Memory loss, short term, Neuropathy, Osteoarthritis, Pain in neck, Rheumatoid arthritis(714.0), Ringing in ears, and TIA (transient ischemic attack).  Surgical History:  has a past surgical history that includes Colonoscopy (); Hysterectomy; cardiovascular stress test (); Tubal ligation; Tooth Extraction (Right, 2014); shoulder surgery (Left); shoulder surgery (Right); and Upper gastrointestinal endoscopy (N/A, 2023).  Social History:  reports that she has been smoking cigarettes. She

## 2024-06-11 ENCOUNTER — HOSPITAL ENCOUNTER (EMERGENCY)
Age: 74
Discharge: HOME | End: 2024-06-11
Attending: EMERGENCY MEDICINE
Payer: COMMERCIAL

## 2024-06-11 ENCOUNTER — APPOINTMENT (OUTPATIENT)
Dept: CT IMAGING | Age: 74
End: 2024-06-11
Payer: COMMERCIAL

## 2024-06-11 VITALS
OXYGEN SATURATION: 100 % | TEMPERATURE: 98.3 F | RESPIRATION RATE: 16 BRPM | DIASTOLIC BLOOD PRESSURE: 88 MMHG | SYSTOLIC BLOOD PRESSURE: 149 MMHG | HEART RATE: 63 BPM

## 2024-06-11 DIAGNOSIS — T78.3XXA ANGIOEDEMA, INITIAL ENCOUNTER: Primary | ICD-10-CM

## 2024-06-11 DIAGNOSIS — K04.7 TOOTH INFECTION: ICD-10-CM

## 2024-06-11 DIAGNOSIS — Z79.899 ON ANGIOTENSIN-CONVERTING ENZYME (ACE) INHIBITORS: ICD-10-CM

## 2024-06-11 LAB
ALBUMIN SERPL-MCNC: 4.1 G/DL (ref 3.5–5.2)
ALP SERPL-CCNC: 89 U/L (ref 35–104)
ALT SERPL-CCNC: 12 U/L (ref 0–32)
ANION GAP SERPL CALCULATED.3IONS-SCNC: 9 MMOL/L (ref 7–16)
AST SERPL-CCNC: 17 U/L (ref 0–31)
BASOPHILS # BLD: 0.04 K/UL (ref 0–0.2)
BASOPHILS NFR BLD: 1 % (ref 0–2)
BILIRUB SERPL-MCNC: 0.2 MG/DL (ref 0–1.2)
BUN SERPL-MCNC: 23 MG/DL (ref 6–23)
CALCIUM SERPL-MCNC: 9.4 MG/DL (ref 8.6–10.2)
CHLORIDE SERPL-SCNC: 108 MMOL/L (ref 98–107)
CO2 SERPL-SCNC: 21 MMOL/L (ref 22–29)
CREAT SERPL-MCNC: 1.1 MG/DL (ref 0.5–1)
EOSINOPHIL # BLD: 0.21 K/UL (ref 0.05–0.5)
EOSINOPHILS RELATIVE PERCENT: 2 % (ref 0–6)
ERYTHROCYTE [DISTWIDTH] IN BLOOD BY AUTOMATED COUNT: 19.4 % (ref 11.5–15)
GFR, ESTIMATED: 51 ML/MIN/1.73M2
GLUCOSE SERPL-MCNC: 82 MG/DL (ref 74–99)
HCT VFR BLD AUTO: 34.8 % (ref 34–48)
HGB BLD-MCNC: 11.1 G/DL (ref 11.5–15.5)
IMM GRANULOCYTES # BLD AUTO: 0.09 K/UL (ref 0–0.58)
IMM GRANULOCYTES NFR BLD: 1 % (ref 0–5)
LACTATE BLDV-SCNC: 0.8 MMOL/L (ref 0.5–2.2)
LYMPHOCYTES NFR BLD: 2.38 K/UL (ref 1.5–4)
LYMPHOCYTES RELATIVE PERCENT: 28 % (ref 20–42)
MCH RBC QN AUTO: 26.8 PG (ref 26–35)
MCHC RBC AUTO-ENTMCNC: 31.9 G/DL (ref 32–34.5)
MCV RBC AUTO: 84.1 FL (ref 80–99.9)
MONOCYTES NFR BLD: 1.25 K/UL (ref 0.1–0.95)
NEUTROPHILS NFR BLD: 54 % (ref 43–80)
NEUTS SEG NFR BLD: 4.62 K/UL (ref 1.8–7.3)
PLATELET # BLD AUTO: 387 K/UL (ref 130–450)
PMV BLD AUTO: 9.5 FL (ref 7–12)
POTASSIUM SERPL-SCNC: 4.1 MMOL/L (ref 3.5–5)
PROT SERPL-MCNC: 7.5 G/DL (ref 6.4–8.3)
RBC # BLD AUTO: 4.14 M/UL (ref 3.5–5.5)
SODIUM SERPL-SCNC: 138 MMOL/L (ref 132–146)
WBC OTHER # BLD: 8.6 K/UL (ref 4.5–11.5)

## 2024-06-11 PROCEDURE — 2580000003 HC RX 258: Performed by: STUDENT IN AN ORGANIZED HEALTH CARE EDUCATION/TRAINING PROGRAM

## 2024-06-11 PROCEDURE — 6360000002 HC RX W HCPCS: Performed by: STUDENT IN AN ORGANIZED HEALTH CARE EDUCATION/TRAINING PROGRAM

## 2024-06-11 PROCEDURE — 70491 CT SOFT TISSUE NECK W/DYE: CPT

## 2024-06-11 PROCEDURE — 99285 EMERGENCY DEPT VISIT HI MDM: CPT

## 2024-06-11 PROCEDURE — 96375 TX/PRO/DX INJ NEW DRUG ADDON: CPT

## 2024-06-11 PROCEDURE — 96365 THER/PROPH/DIAG IV INF INIT: CPT

## 2024-06-11 PROCEDURE — 85025 COMPLETE CBC W/AUTO DIFF WBC: CPT

## 2024-06-11 PROCEDURE — 80053 COMPREHEN METABOLIC PANEL: CPT

## 2024-06-11 PROCEDURE — 83605 ASSAY OF LACTIC ACID: CPT

## 2024-06-11 PROCEDURE — 6360000004 HC RX CONTRAST MEDICATION: Performed by: RADIOLOGY

## 2024-06-11 PROCEDURE — 2500000003 HC RX 250 WO HCPCS: Performed by: STUDENT IN AN ORGANIZED HEALTH CARE EDUCATION/TRAINING PROGRAM

## 2024-06-11 RX ORDER — EPINEPHRINE 1 MG/ML
0.3 INJECTION, SOLUTION, CONCENTRATE INTRAVENOUS ONCE
Status: DISCONTINUED | OUTPATIENT
Start: 2024-06-11 | End: 2024-06-11

## 2024-06-11 RX ORDER — METHYLPREDNISOLONE SODIUM SUCCINATE 125 MG/2ML
125 INJECTION, POWDER, LYOPHILIZED, FOR SOLUTION INTRAMUSCULAR; INTRAVENOUS ONCE
Status: COMPLETED | OUTPATIENT
Start: 2024-06-11 | End: 2024-06-11

## 2024-06-11 RX ORDER — 0.9 % SODIUM CHLORIDE 0.9 %
1000 INTRAVENOUS SOLUTION INTRAVENOUS ONCE
Status: COMPLETED | OUTPATIENT
Start: 2024-06-11 | End: 2024-06-11

## 2024-06-11 RX ORDER — DIPHENHYDRAMINE HYDROCHLORIDE 50 MG/ML
25 INJECTION INTRAMUSCULAR; INTRAVENOUS ONCE
Status: COMPLETED | OUTPATIENT
Start: 2024-06-11 | End: 2024-06-11

## 2024-06-11 RX ORDER — TRANEXAMIC ACID 10 MG/ML
1000 INJECTION, SOLUTION INTRAVENOUS ONCE
Status: COMPLETED | OUTPATIENT
Start: 2024-06-11 | End: 2024-06-11

## 2024-06-11 RX ADMIN — SODIUM CHLORIDE 1000 ML: 9 INJECTION, SOLUTION INTRAVENOUS at 06:24

## 2024-06-11 RX ADMIN — FAMOTIDINE 20 MG: 10 INJECTION, SOLUTION INTRAVENOUS at 06:39

## 2024-06-11 RX ADMIN — DIPHENHYDRAMINE HYDROCHLORIDE 25 MG: 50 INJECTION INTRAMUSCULAR; INTRAVENOUS at 06:36

## 2024-06-11 RX ADMIN — METHYLPREDNISOLONE SODIUM SUCCINATE 125 MG: 125 INJECTION INTRAMUSCULAR; INTRAVENOUS at 06:37

## 2024-06-11 RX ADMIN — TRANEXAMIC ACID 1000 MG: 10 INJECTION, SOLUTION INTRAVENOUS at 06:55

## 2024-06-11 RX ADMIN — IOPAMIDOL 75 ML: 755 INJECTION, SOLUTION INTRAVENOUS at 08:26

## 2024-06-11 NOTE — ED PROVIDER NOTES
Name: Clau De La Vega    MRN: [id number]     Date / Time Roomed:  6/11/2024  5:56 AM  ED Bed Assignment:  11/11    ------------------ History of Present Illness --------------------  6/11/24, Time: 6:01 AM EDT   Chief Complaint   Patient presents with    Oral Swelling     Pt states she was seen for a tooth infection last week and this week swelling has spread to tongue.   Oxygen sat 100% room air.       HPI    Clau De La Vega is a 73 y.o. female, with hx of hyperlipidemia, hypertension, rheumatoid arthritis, TIA, headaches, chronic back pain, GERD,, who presents to the ED today for concern for tooth infection which is causing tongue swelling as well.  Patient was seen last week for tooth infection at the dentist and was given antibiotic which she has been on for the past week and states she has several days left of this.  She states she has been taking this appropriately.  She states this morning around 2 AM she noticed her tongue was feeling a bit swollen and has worsened over the past several hours.  She is able to swallow denies any shortness of breath however states it does feel a bit strange to swallow.  She denies having had this happen before.  Patient is on lisinopril hydrochlorothiazide combo.  She denies any nausea vomiting, chest pain, shortness of breath, itchiness, rash, abdominal pain, fever, GI or  complaints.  The pt denies other ROS at this time.     PCP: Deyvi Lewis MD.    -------------------- PMH --------------------    Past Medical History:   has a past medical history of Acid reflux, Analgesic rebound headache (1/8/2019), Chronic back pain, Constipation, Headache(784.0), History of sinus problem, Hyperlipidemia, Hypertension, Memory loss, short term, Neuropathy, Osteoarthritis, Pain in neck, Rheumatoid arthritis(714.0), Ringing in ears, and TIA (transient ischemic attack).     Surgical History:  Past Surgical History:   Procedure Laterality Date    CARDIOVASCULAR STRESS TEST  2003    normal

## 2024-06-11 NOTE — DISCHARGE INSTRUCTIONS
Please follow-up with your primary care doctor, Dr. Lewis.  He will be expecting your call and states he can see you in the office later this week.  Please call him to get this scheduled.      Please stop taking your lisinopril hydrochlorothiazide combination medication.     Continue taking your antibiotic medication as scheduled for your tooth infection and follow-up with your dentist on this as scheduled.    If you ever have tongue swelling or lip swelling again, you need to come immediately to the emergency department.

## 2024-06-11 NOTE — ED NOTES
Radiology Procedure Waiver   Name: Clau De La Vega  : 1950  MRN: [id number]    Date:  24    Time: 6:14 AM EDT    Benefits of immediately proceeding with Radiology exam(s) without pre-testing outweigh the risks or are not indicated as specified below and therefore the following is/are being waived:    [] Pregnancy test   [] Patients LMP on-time and regular.   [] Patient had Tubal Ligation or has other Contraception Device.   [] Patient  is Menopausal or Premenarcheal.    [] Patient had Full or Partial Hysterectomy.    [] Protocol for Iodine allergy    [] MRI Questionnaire     [x] BUN/Creatinine   [] Patient age w/no hx of renal dysfunction.   [] Patient on Dialysis.   [x] Recent Normal Labs.  Electronically signed by Jorge Conway DO on 24 at 6:14 AM EDT

## 2024-08-12 ENCOUNTER — APPOINTMENT (OUTPATIENT)
Dept: CT IMAGING | Age: 74
End: 2024-08-12
Payer: COMMERCIAL

## 2024-08-12 ENCOUNTER — APPOINTMENT (OUTPATIENT)
Dept: GENERAL RADIOLOGY | Age: 74
End: 2024-08-12
Payer: COMMERCIAL

## 2024-08-12 ENCOUNTER — HOSPITAL ENCOUNTER (EMERGENCY)
Age: 74
Discharge: HOME | End: 2024-08-12
Attending: EMERGENCY MEDICINE
Payer: COMMERCIAL

## 2024-08-12 VITALS
HEART RATE: 69 BPM | HEIGHT: 58 IN | DIASTOLIC BLOOD PRESSURE: 89 MMHG | OXYGEN SATURATION: 100 % | RESPIRATION RATE: 19 BRPM | TEMPERATURE: 98.5 F | WEIGHT: 134 LBS | SYSTOLIC BLOOD PRESSURE: 152 MMHG | BODY MASS INDEX: 28.13 KG/M2

## 2024-08-12 DIAGNOSIS — R10.84 GENERALIZED ABDOMINAL PAIN: Primary | ICD-10-CM

## 2024-08-12 LAB
ALBUMIN SERPL-MCNC: 4.1 G/DL (ref 3.5–5.2)
ALBUMIN SERPL-MCNC: 4.1 G/DL (ref 3.5–5.2)
ALP SERPL-CCNC: 110 U/L (ref 35–104)
ALP SERPL-CCNC: 111 U/L (ref 35–104)
ALT SERPL-CCNC: 9 U/L (ref 0–32)
ALT SERPL-CCNC: 9 U/L (ref 0–32)
ANION GAP SERPL CALCULATED.3IONS-SCNC: 10 MMOL/L (ref 7–16)
AST SERPL-CCNC: 16 U/L (ref 0–31)
AST SERPL-CCNC: 19 U/L (ref 0–31)
BASOPHILS # BLD: 0.05 K/UL (ref 0–0.2)
BASOPHILS NFR BLD: 1 % (ref 0–2)
BILIRUB DIRECT SERPL-MCNC: <0.2 MG/DL (ref 0–0.3)
BILIRUB INDIRECT SERPL-MCNC: ABNORMAL MG/DL (ref 0–1)
BILIRUB SERPL-MCNC: 0.3 MG/DL (ref 0–1.2)
BILIRUB SERPL-MCNC: 0.3 MG/DL (ref 0–1.2)
BILIRUB UR QL STRIP: NEGATIVE
BNP SERPL-MCNC: 330 PG/ML (ref 0–125)
BUN SERPL-MCNC: 11 MG/DL (ref 6–23)
CALCIUM SERPL-MCNC: 9.4 MG/DL (ref 8.6–10.2)
CHLORIDE SERPL-SCNC: 106 MMOL/L (ref 98–107)
CK SERPL-CCNC: 65 U/L (ref 20–180)
CLARITY UR: CLEAR
CO2 SERPL-SCNC: 23 MMOL/L (ref 22–29)
COLOR UR: YELLOW
CREAT SERPL-MCNC: 0.9 MG/DL (ref 0.5–1)
EOSINOPHIL # BLD: 0.06 K/UL (ref 0.05–0.5)
EOSINOPHILS RELATIVE PERCENT: 1 % (ref 0–6)
ERYTHROCYTE [DISTWIDTH] IN BLOOD BY AUTOMATED COUNT: 16.2 % (ref 11.5–15)
GFR, ESTIMATED: 73 ML/MIN/1.73M2
GLUCOSE SERPL-MCNC: 89 MG/DL (ref 74–99)
GLUCOSE UR STRIP-MCNC: NEGATIVE MG/DL
HCT VFR BLD AUTO: 39.2 % (ref 34–48)
HGB BLD-MCNC: 12.6 G/DL (ref 11.5–15.5)
HGB UR QL STRIP.AUTO: ABNORMAL
IMM GRANULOCYTES # BLD AUTO: 0.05 K/UL (ref 0–0.58)
IMM GRANULOCYTES NFR BLD: 1 % (ref 0–5)
KETONES UR STRIP-MCNC: ABNORMAL MG/DL
LEUKOCYTE ESTERASE UR QL STRIP: ABNORMAL
LIPASE SERPL-CCNC: 18 U/L (ref 13–60)
LYMPHOCYTES NFR BLD: 1.44 K/UL (ref 1.5–4)
LYMPHOCYTES RELATIVE PERCENT: 14 % (ref 20–42)
MCH RBC QN AUTO: 27.2 PG (ref 26–35)
MCHC RBC AUTO-ENTMCNC: 32.1 G/DL (ref 32–34.5)
MCV RBC AUTO: 84.5 FL (ref 80–99.9)
MONOCYTES NFR BLD: 1.1 K/UL (ref 0.1–0.95)
MONOCYTES NFR BLD: 11 % (ref 2–12)
NEUTROPHILS NFR BLD: 74 % (ref 43–80)
NEUTS SEG NFR BLD: 7.48 K/UL (ref 1.8–7.3)
NITRITE UR QL STRIP: NEGATIVE
PH UR STRIP: 6 [PH] (ref 5–9)
PLATELET # BLD AUTO: 492 K/UL (ref 130–450)
PMV BLD AUTO: 9.6 FL (ref 7–12)
POTASSIUM SERPL-SCNC: 4 MMOL/L (ref 3.5–5)
PROT SERPL-MCNC: 8.3 G/DL (ref 6.4–8.3)
PROT SERPL-MCNC: 8.3 G/DL (ref 6.4–8.3)
PROT UR STRIP-MCNC: NEGATIVE MG/DL
RBC # BLD AUTO: 4.64 M/UL (ref 3.5–5.5)
RBC #/AREA URNS HPF: ABNORMAL /HPF
SODIUM SERPL-SCNC: 139 MMOL/L (ref 132–146)
SP GR UR STRIP: 1.01 (ref 1–1.03)
TROPONIN I SERPL HS-MCNC: 11 NG/L (ref 0–9)
TROPONIN I SERPL HS-MCNC: 12 NG/L (ref 0–9)
UROBILINOGEN UR STRIP-ACNC: 0.2 EU/DL (ref 0–1)
WBC #/AREA URNS HPF: ABNORMAL /HPF
WBC OTHER # BLD: 10.2 K/UL (ref 4.5–11.5)

## 2024-08-12 PROCEDURE — 74177 CT ABD & PELVIS W/CONTRAST: CPT

## 2024-08-12 PROCEDURE — 87086 URINE CULTURE/COLONY COUNT: CPT

## 2024-08-12 PROCEDURE — 99285 EMERGENCY DEPT VISIT HI MDM: CPT

## 2024-08-12 PROCEDURE — 83880 ASSAY OF NATRIURETIC PEPTIDE: CPT

## 2024-08-12 PROCEDURE — 84484 ASSAY OF TROPONIN QUANT: CPT

## 2024-08-12 PROCEDURE — 80076 HEPATIC FUNCTION PANEL: CPT

## 2024-08-12 PROCEDURE — 81001 URINALYSIS AUTO W/SCOPE: CPT

## 2024-08-12 PROCEDURE — 6370000000 HC RX 637 (ALT 250 FOR IP)

## 2024-08-12 PROCEDURE — 93005 ELECTROCARDIOGRAM TRACING: CPT

## 2024-08-12 PROCEDURE — 85025 COMPLETE CBC W/AUTO DIFF WBC: CPT

## 2024-08-12 PROCEDURE — 82550 ASSAY OF CK (CPK): CPT

## 2024-08-12 PROCEDURE — 83690 ASSAY OF LIPASE: CPT

## 2024-08-12 PROCEDURE — 6360000004 HC RX CONTRAST MEDICATION: Performed by: RADIOLOGY

## 2024-08-12 PROCEDURE — 80053 COMPREHEN METABOLIC PANEL: CPT

## 2024-08-12 PROCEDURE — 71045 X-RAY EXAM CHEST 1 VIEW: CPT

## 2024-08-12 PROCEDURE — 2580000003 HC RX 258

## 2024-08-12 RX ORDER — LIDOCAINE 4 G/G
1 PATCH TOPICAL ONCE
Status: DISCONTINUED | OUTPATIENT
Start: 2024-08-12 | End: 2024-08-12 | Stop reason: HOSPADM

## 2024-08-12 RX ORDER — MORPHINE SULFATE 2 MG/ML
2 INJECTION, SOLUTION INTRAMUSCULAR; INTRAVENOUS ONCE
Status: DISCONTINUED | OUTPATIENT
Start: 2024-08-12 | End: 2024-08-12

## 2024-08-12 RX ORDER — LIDOCAINE 4 G/G
1 PATCH TOPICAL DAILY
Qty: 7 PATCH | Refills: 0 | Status: SHIPPED | OUTPATIENT
Start: 2024-08-12 | End: 2024-08-19

## 2024-08-12 RX ORDER — DICYCLOMINE HYDROCHLORIDE 10 MG/1
10 CAPSULE ORAL ONCE
Status: DISCONTINUED | OUTPATIENT
Start: 2024-08-12 | End: 2024-08-12

## 2024-08-12 RX ORDER — SODIUM CHLORIDE 9 MG/ML
INJECTION, SOLUTION INTRAVENOUS ONCE
Status: COMPLETED | OUTPATIENT
Start: 2024-08-12 | End: 2024-08-12

## 2024-08-12 RX ADMIN — SODIUM CHLORIDE: 9 INJECTION, SOLUTION INTRAVENOUS at 16:06

## 2024-08-12 RX ADMIN — IOPAMIDOL 75 ML: 755 INJECTION, SOLUTION INTRAVENOUS at 18:15

## 2024-08-12 ASSESSMENT — LIFESTYLE VARIABLES
HOW OFTEN DO YOU HAVE A DRINK CONTAINING ALCOHOL: NEVER
HOW MANY STANDARD DRINKS CONTAINING ALCOHOL DO YOU HAVE ON A TYPICAL DAY: PATIENT DOES NOT DRINK
HOW MANY STANDARD DRINKS CONTAINING ALCOHOL DO YOU HAVE ON A TYPICAL DAY: PATIENT DOES NOT DRINK
HOW OFTEN DO YOU HAVE A DRINK CONTAINING ALCOHOL: NEVER

## 2024-08-12 ASSESSMENT — PAIN SCALES - GENERAL: PAINLEVEL_OUTOF10: 8

## 2024-08-12 ASSESSMENT — PAIN DESCRIPTION - LOCATION: LOCATION: BACK

## 2024-08-12 ASSESSMENT — PAIN DESCRIPTION - ORIENTATION: ORIENTATION: LOWER

## 2024-08-12 ASSESSMENT — PAIN - FUNCTIONAL ASSESSMENT: PAIN_FUNCTIONAL_ASSESSMENT: 0-10

## 2024-08-12 NOTE — ED TRIAGE NOTES
FIRST PROVIDER CONTACT ASSESSMENT NOTE       Department of Emergency Medicine                 First Provider Note            24  2:43 PM EDT    Date of Encounter: No admission date for patient encounter.    Patient Name: Clau De La Vega  : 1950  MRN: [id number]    Chief Complaint: No chief complaint on file.      History of Present Illness:   Clau De La Vega is a 73 y.o. female who presents to the ED for patient has had upset stomach since Friday.  Patient denies vomiting and has constipation.  Patient had a syncopal episode in triage.  Patient states she felt lightheaded.  Nurse caught her and she never hit the ground.       Focused Physical Exam:  VS:    ED Triage Vitals   BP Systolic BP Percentile Diastolic BP Percentile Temp Temp src Pulse Resp SpO2   -- -- -- -- -- -- -- --      Height Weight         -- --              Physical Ex: Constitutional: Alert and non-toxic.    Medical History:  has a past medical history of Acid reflux, Analgesic rebound headache, Chronic back pain, Constipation, Headache(784.0), History of sinus problem, Hyperlipidemia, Hypertension, Memory loss, short term, Neuropathy, Osteoarthritis, Pain in neck, Rheumatoid arthritis(714.0), Ringing in ears, and TIA (transient ischemic attack).  Surgical History:  has a past surgical history that includes Colonoscopy (); Hysterectomy; cardiovascular stress test (); Tubal ligation; Tooth Extraction (Right, 2014); shoulder surgery (Left); shoulder surgery (Right); and Upper gastrointestinal endoscopy (N/A, 2023).  Social History:  reports that she has been smoking cigarettes. She has a 20 pack-year smoking history. She has never used smokeless tobacco. She reports that she does not drink alcohol and does not use drugs.  Family History: family history includes Alzheimer's Disease in her mother; Bleeding Prob in her mother; Cancer in her father; High Cholesterol in her mother; Other in her mother.    Allergies: Latex,

## 2024-08-12 NOTE — ED PROVIDER NOTES
[facility] EMERGENCY DEPARTMENT  EMERGENCY DEPARTMENT ENCOUNTER        Pt Name: Clau De La Vega  MRN: [id number]  Birthdate 1950  Date of evaluation: 8/12/2024  Provider: Kasey Izquierdo DO  PCP: Deyvi Lewis MD  Note Started: 4:18 PM EDT 8/12/24    CHIEF COMPLAINT       No chief complaint on file.      HISTORY OF PRESENT ILLNESS: 1 or more Elements   History From: Patient    Limitations to history : None    Clau De La Vega is a 73 y.o. female who presents with concern for generalized abdominal pain.  She does note that symptoms have been ongoing since Friday, nothing is made it better or worse.  She has had episodes of nausea and vomiting as well.  She says that she intermittently feels lightheaded when the symptoms occur but she has not fallen or hit her head.  No numbness, weakness, tingling, no fevers or chills, no chest pain or difficulty breathing.  She is not having any urinary complaints.  No other associations.  Of note, she has not been eating or drinking much over the past few days but notes that she does feel improved at this time.      EXTERNAL NOTE REVIEW:      On chart review was last admitted to the hospital for acute on chronic anemia on 8/15/2023.    REVIEW OF SYSTEMS :      Positives and Pertinent negatives as per HPI.     SURGICAL HISTORY     Past Surgical History:   Procedure Laterality Date    CARDIOVASCULAR STRESS TEST  2003    normal    COLONOSCOPY  2006    Normal.  Re-referred for diagnostic on 9/10    HYSTERECTOMY (CERVIX STATUS UNKNOWN)      SHOULDER SURGERY Left     SHOULDER SURGERY Right     TOOTH EXTRACTION Right October 2014    TUBAL LIGATION      1978    UPPER GASTROINTESTINAL ENDOSCOPY N/A 8/16/2023    EGD ESOPHAGOGASTRODUODENOSCOPY DILATATION performed by Estrella Ruiz MD at [facility] ENDOSCOPY       CURRENTMEDICATIONS       Discharge Medication List as of 8/12/2024  7:51 PM        CONTINUE these medications which have NOT CHANGED    Details
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
## 2024-08-14 LAB
MICROORGANISM SPEC CULT: ABNORMAL
SERVICE CMNT-IMP: ABNORMAL
SPECIMEN DESCRIPTION: ABNORMAL

## 2024-08-15 LAB
EKG ATRIAL RATE: 63 BPM
EKG P AXIS: 70 DEGREES
EKG P-R INTERVAL: 156 MS
EKG Q-T INTERVAL: 442 MS
EKG QRS DURATION: 66 MS
EKG QTC CALCULATION (BAZETT): 452 MS
EKG R AXIS: -13 DEGREES
EKG T AXIS: -52 DEGREES
EKG VENTRICULAR RATE: 63 BPM

## 2024-08-15 PROCEDURE — 93010 ELECTROCARDIOGRAM REPORT: CPT | Performed by: INTERNAL MEDICINE

## 2024-08-20 RX ORDER — TRIAMCINOLONE ACETONIDE 0.25 MG/G
CREAM TOPICAL
Qty: 80 G | OUTPATIENT
Start: 2024-08-20

## 2024-08-26 RX ORDER — FERROUS SULFATE 325(65) MG
1 TABLET ORAL EVERY OTHER DAY
Qty: 180 TABLET | Refills: 1 | Status: SHIPPED | OUTPATIENT
Start: 2024-08-26

## 2024-11-18 RX ORDER — PREDNISONE 2.5 MG/1
TABLET ORAL
Qty: 90 TABLET | OUTPATIENT
Start: 2024-11-18

## 2024-11-18 RX ORDER — METHOTREXATE 2.5 MG/1
TABLET ORAL
Qty: 96 TABLET | OUTPATIENT
Start: 2024-11-18

## 2024-11-28 ENCOUNTER — HOSPITAL ENCOUNTER (EMERGENCY)
Age: 74
Discharge: HOME | End: 2024-11-28
Attending: EMERGENCY MEDICINE
Payer: COMMERCIAL

## 2024-11-28 VITALS
TEMPERATURE: 98.4 F | RESPIRATION RATE: 18 BRPM | DIASTOLIC BLOOD PRESSURE: 60 MMHG | WEIGHT: 135 LBS | OXYGEN SATURATION: 98 % | BODY MASS INDEX: 28.22 KG/M2 | SYSTOLIC BLOOD PRESSURE: 178 MMHG | HEART RATE: 74 BPM

## 2024-11-28 DIAGNOSIS — T30.0 BURN: Primary | ICD-10-CM

## 2024-11-28 PROCEDURE — 99284 EMERGENCY DEPT VISIT MOD MDM: CPT

## 2024-11-28 PROCEDURE — 90471 IMMUNIZATION ADMIN: CPT | Performed by: EMERGENCY MEDICINE

## 2024-11-28 PROCEDURE — 99283 EMERGENCY DEPT VISIT LOW MDM: CPT

## 2024-11-28 PROCEDURE — 6370000000 HC RX 637 (ALT 250 FOR IP): Performed by: EMERGENCY MEDICINE

## 2024-11-28 PROCEDURE — 90714 TD VACC NO PRESV 7 YRS+ IM: CPT | Performed by: EMERGENCY MEDICINE

## 2024-11-28 PROCEDURE — 6360000002 HC RX W HCPCS: Performed by: EMERGENCY MEDICINE

## 2024-11-28 RX ORDER — CEPHALEXIN 500 MG/1
500 CAPSULE ORAL 3 TIMES DAILY
Qty: 21 CAPSULE | Refills: 0 | Status: SHIPPED | OUTPATIENT
Start: 2024-11-28 | End: 2024-12-05

## 2024-11-28 RX ORDER — CEPHALEXIN 500 MG/1
500 CAPSULE ORAL ONCE
Status: COMPLETED | OUTPATIENT
Start: 2024-11-28 | End: 2024-11-28

## 2024-11-28 RX ADMIN — CEPHALEXIN 500 MG: 500 CAPSULE ORAL at 13:05

## 2024-11-28 RX ADMIN — CLOSTRIDIUM TETANI TOXOID ANTIGEN (FORMALDEHYDE INACTIVATED) AND CORYNEBACTERIUM DIPHTHERIAE TOXOID ANTIGEN (FORMALDEHYDE INACTIVATED) 0.5 ML: 5; 2 INJECTION, SUSPENSION INTRAMUSCULAR at 13:05

## 2024-11-28 ASSESSMENT — PAIN - FUNCTIONAL ASSESSMENT: PAIN_FUNCTIONAL_ASSESSMENT: 0-10

## 2024-11-28 ASSESSMENT — PAIN DESCRIPTION - DESCRIPTORS: DESCRIPTORS: BURNING

## 2024-11-28 ASSESSMENT — PAIN DESCRIPTION - ORIENTATION: ORIENTATION: RIGHT;LEFT

## 2024-11-28 ASSESSMENT — PAIN DESCRIPTION - LOCATION: LOCATION: FOOT

## 2024-11-28 ASSESSMENT — PAIN SCALES - GENERAL: PAINLEVEL_OUTOF10: 10
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
## 2025-02-26 RX ORDER — TOFACITINIB 5 MG/1
1 TABLET, FILM COATED ORAL 2 TIMES DAILY
Qty: 60 TABLET | Refills: 3 | OUTPATIENT
Start: 2025-02-26

## 2025-03-18 RX ORDER — PREDNISONE 2.5 MG/1
2.5 TABLET ORAL DAILY
Qty: 90 TABLET | OUTPATIENT
Start: 2025-03-18

## 2025-04-18 RX ORDER — METHOTREXATE 2.5 MG/1
TABLET ORAL
Qty: 96 TABLET | OUTPATIENT
Start: 2025-04-18

## 2025-04-28 ENCOUNTER — TELEPHONE (OUTPATIENT)
Dept: FAMILY MEDICINE CLINIC | Age: 75
End: 2025-04-28

## 2025-04-28 NOTE — TELEPHONE ENCOUNTER
----- Message from Ronel DOS SANTOS sent at 4/28/2025 12:57 PM EDT -----  Regarding: ECC Message to Provider  ECC Message to Provider    Relationship to Patient: Self     Additional Information Patient is requesting sooner appointment than 06/17/2025 because she [place] due to her cholesterol pill from the previous pcp   --------------------------------------------------------------------------------------------------------------------------    Call Back Information: OK to leave message on voicemail  Preferred Call Back Number: Phone [phone] (home)

## 2025-05-06 ENCOUNTER — OFFICE VISIT (OUTPATIENT)
Dept: FAMILY MEDICINE CLINIC | Age: 75
End: 2025-05-06

## 2025-05-06 VITALS
WEIGHT: 138 LBS | RESPIRATION RATE: 18 BRPM | TEMPERATURE: 97.2 F | SYSTOLIC BLOOD PRESSURE: 171 MMHG | BODY MASS INDEX: 28.97 KG/M2 | DIASTOLIC BLOOD PRESSURE: 72 MMHG | HEART RATE: 83 BPM | HEIGHT: 58 IN | OXYGEN SATURATION: 94 %

## 2025-05-06 DIAGNOSIS — E78.2 MIXED HYPERLIPIDEMIA: ICD-10-CM

## 2025-05-06 DIAGNOSIS — Z12.31 SCREENING MAMMOGRAM FOR BREAST CANCER: ICD-10-CM

## 2025-05-06 DIAGNOSIS — I10 ESSENTIAL HYPERTENSION: Primary | Chronic | ICD-10-CM

## 2025-05-06 DIAGNOSIS — M25.551 PAIN OF RIGHT HIP: ICD-10-CM

## 2025-05-06 DIAGNOSIS — Z12.11 ENCOUNTER FOR SCREENING COLONOSCOPY: ICD-10-CM

## 2025-05-06 DIAGNOSIS — M06.9 RHEUMATOID ARTHRITIS INVOLVING BOTH HANDS, UNSPECIFIED WHETHER RHEUMATOID FACTOR PRESENT (HCC): ICD-10-CM

## 2025-05-06 RX ORDER — ATORVASTATIN CALCIUM 20 MG/1
20 TABLET, FILM COATED ORAL DAILY
Qty: 30 TABLET | Refills: 5 | Status: SHIPPED | OUTPATIENT
Start: 2025-05-06

## 2025-05-06 RX ORDER — TRIAMCINOLONE ACETONIDE 0.25 MG/G
CREAM TOPICAL 2 TIMES DAILY
COMMUNITY
Start: 2025-05-05

## 2025-05-06 RX ORDER — AMLODIPINE BESYLATE 5 MG/1
5 TABLET ORAL DAILY
COMMUNITY
End: 2025-05-06 | Stop reason: SDUPTHER

## 2025-05-06 RX ORDER — AMLODIPINE BESYLATE 5 MG/1
10 TABLET ORAL DAILY
Qty: 180 TABLET | Refills: 1 | Status: SHIPPED | OUTPATIENT
Start: 2025-05-06 | End: 2025-11-02

## 2025-05-06 SDOH — ECONOMIC STABILITY: FOOD INSECURITY: WITHIN THE PAST 12 MONTHS, YOU WORRIED THAT YOUR FOOD WOULD RUN OUT BEFORE YOU GOT MONEY TO BUY MORE.: SOMETIMES TRUE

## 2025-05-06 SDOH — ECONOMIC STABILITY: FOOD INSECURITY: WITHIN THE PAST 12 MONTHS, THE FOOD YOU BOUGHT JUST DIDN'T LAST AND YOU DIDN'T HAVE MONEY TO GET MORE.: SOMETIMES TRUE

## 2025-05-06 ASSESSMENT — PATIENT HEALTH QUESTIONNAIRE - PHQ9
SUM OF ALL RESPONSES TO PHQ QUESTIONS 1-9: 0
1. LITTLE INTEREST OR PLEASURE IN DOING THINGS: NOT AT ALL
SUM OF ALL RESPONSES TO PHQ QUESTIONS 1-9: 0
SUM OF ALL RESPONSES TO PHQ QUESTIONS 1-9: 0
2. FEELING DOWN, DEPRESSED OR HOPELESS: NOT AT ALL
SUM OF ALL RESPONSES TO PHQ QUESTIONS 1-9: 0

## 2025-05-06 NOTE — PROGRESS NOTES
74-year-old female establishing care today.  She has a history of rheumatoid arthritis, hypertension, hyperlipidemia.  Right hip osteoarthritis status post right hip arthroscopy plasty.  Today she presents with concern of right hip pain that has been ongoing for several months.  She endorses swelling over affected joint area.  Pain is exacerbated by ambulation.  She denies any fevers or chills.  There is no warmth either.  She denies numbness, weakness or falls.  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  She underwent  washout of the joint.  And then has been following with pain management and orthopedic surgery at [facility].  Patient concerned because pain  and swelling are  worsening.   Patient states at follow-up with Ortho last year she was told she had stress fracture of the right hip.  A CT abdomen pelvis in August 2024 suggest that small fluid collection in the right hip joint space suspected to be a seroma. no prior DEXA scan on file.  She is not on calcium or vitamin D supplementation.  No bisphosphonates either.    Blood pressure (!) 171/72, pulse 83, temperature 97.2 °F (36.2 °C), temperature source Temporal, resp. rate 18, height 1.473 m (4' 10\"), weight 62.6 kg (138 lb), SpO2 94%, not currently breastfeeding.    HEENT WNL     Heart regular    Lungs clear    abd non-tender      No edema   mild swelling about right hip, no erythema appreciated, exquisite tenderness to palpation over lateral right hip as well as right knee joints line.  Positive logroll test.  Exam limited secondary to pain.  Pulses intact.    A/P:  Right hip pain, history of rheumatoid arthritis: Will order CT of the hip to rule out any acute processes.  Follow-up with orthopedic as scheduled.  Discussed physical therapy but patient not amenable at this time.  Will continue Voltaren gel topically pending results of CT.  Will order CRP, ESR, Pro-Srinivasan, rheumatoid factor.  Either referral back 
kg/m²   Physical Exam     GEN: Well appearing, NAD  HEENT: normocephalic, atraumatic, PERRL, no cervical Lymphadenopathy, mucosa pink and moist, no oropharyngeal erythema, no tonsillar edema or exudates  CVS: RRR, S1,S2, non-displaced PMI, no murmur, gallops or rubs  RESP: CTAB, no wheezing, rhonchi or crackles  ABD: Soft, non-TTP, normal bowel sounds, no rigidity, no rebound, no guarding, no organomegaly appreciated  EXT: no ulcers, rash, bruising or peripheral edema, pulses palpable, Full ROM, normal muscle tone and bulk, 5/5 muscle strength in B/L UE and LE  NEURO: AAOX4, follows commands, CN II - XII intact, no gross neurologic deficits, reflexes intact and symmetrical, sensation intact B/L  ___________________________________________________________________    Assessment & Plan:  Pain of right hip  S/p Rt SLAVA and prior hip infection  Concern for Rt hip stress fracture?   - CT HIP RIGHT W WO CONTRAST; Future  - DEXA BONE DENSITY AXIAL SKELETON; Future  - C-Reactive Protein  - Procalcitonin  - Continue Voltaren gel  - Continue follow up with orthopedic surgery and Pain management.   -Further management pending CT results.  Is on prednisone chronically in setting of RA which can predispose to osteoporosis and stress fractures. Discuss tapering off if able.    Essential hypertension  - CBC with Auto Differential  - Comprehensive Metabolic Panel  - Continue Norvasc. Will augment BP regimen as indicated at f/u  - Encouraged to keep BP log    Rheumatoid arthritis involving both hands, unspecified whether rheumatoid factor present (HCC)  - Sedimentation Rate  - C-Reactive Protein  - Procalcitonin  - Rheumatoid Factor  - Follows with Rheumatology.   - Continue Xeljanz, Methotrexate.    Mixed hyperlipidemia  - Lipid Panel  - Continue Lipitor    Screening mammogram for breast cancer  - YOLANDA DIGITAL SCREEN BILATERAL PER PROTOCOL; Future    Encounter for screening colonoscopy  - Juan David Harley MD, General Surgery,

## 2025-05-06 NOTE — PATIENT INSTRUCTIONS
30 miles from residence) and non-medical (12 miles from residence)  Phone:  [phone]                                    [facility] HEALTHY AGING PROGRAM  What they offer: Transportation to medical appointments for [facility] residents for doctor appointments only; requires 2 business day notice      No cost   May be limited due to funding  Phone:  [phone] ([facility])                             INDIVIDUALS WITH MEDICAID MANAGED CARE PLANS ([facility], [place], [place] [facility], [employer], [employer], [employer], [facility]): call the member services phone number on your medical card and request transportation at least 3 days in advance                           INDIVIDUALS WITH SOME MEDICARE ADVANTAGE PLANS OR MEDICARE AND MEDICAID PLANS ALSO HAVE TRANSPORTATION AVAILABLE: CALL THE MEMBER SERVICES NUMBER ON YOUR INSURANCE CARD AND INQUIRE IF HAVE TRANSPORTATION BENEFIT

## 2025-05-11 ENCOUNTER — HOSPITAL ENCOUNTER (EMERGENCY)
Age: 75
Discharge: HOME | End: 2025-05-11
Attending: EMERGENCY MEDICINE
Payer: COMMERCIAL

## 2025-05-11 ENCOUNTER — APPOINTMENT (OUTPATIENT)
Dept: CT IMAGING | Age: 75
End: 2025-05-11
Payer: COMMERCIAL

## 2025-05-11 VITALS
BODY MASS INDEX: 28.97 KG/M2 | WEIGHT: 138 LBS | SYSTOLIC BLOOD PRESSURE: 158 MMHG | HEIGHT: 58 IN | OXYGEN SATURATION: 96 % | HEART RATE: 88 BPM | TEMPERATURE: 99.7 F | RESPIRATION RATE: 19 BRPM | DIASTOLIC BLOOD PRESSURE: 66 MMHG

## 2025-05-11 DIAGNOSIS — M25.551 RIGHT HIP PAIN: Primary | ICD-10-CM

## 2025-05-11 LAB
ALBUMIN SERPL-MCNC: 4.1 G/DL (ref 3.5–5.2)
ALP SERPL-CCNC: 104 U/L (ref 35–104)
ALT SERPL-CCNC: 9 U/L (ref 0–32)
ANION GAP SERPL CALCULATED.3IONS-SCNC: 12 MMOL/L (ref 7–16)
AST SERPL-CCNC: 17 U/L (ref 0–31)
BASOPHILS # BLD: 0.03 K/UL (ref 0–0.2)
BASOPHILS NFR BLD: 0 % (ref 0–2)
BILIRUB SERPL-MCNC: 0.6 MG/DL (ref 0–1.2)
BUN SERPL-MCNC: 11 MG/DL (ref 6–23)
CALCIUM SERPL-MCNC: 9.8 MG/DL (ref 8.6–10.2)
CHLORIDE SERPL-SCNC: 103 MMOL/L (ref 98–107)
CO2 SERPL-SCNC: 22 MMOL/L (ref 22–29)
CREAT SERPL-MCNC: 0.7 MG/DL (ref 0.5–1)
EOSINOPHIL # BLD: 0 K/UL (ref 0.05–0.5)
EOSINOPHILS RELATIVE PERCENT: 0 % (ref 0–6)
ERYTHROCYTE [DISTWIDTH] IN BLOOD BY AUTOMATED COUNT: 18.6 % (ref 11.5–15)
GFR, ESTIMATED: 86 ML/MIN/1.73M2
GLUCOSE SERPL-MCNC: 106 MG/DL (ref 74–99)
HCT VFR BLD AUTO: 35.1 % (ref 34–48)
HGB BLD-MCNC: 11.3 G/DL (ref 11.5–15.5)
IMM GRANULOCYTES # BLD AUTO: 0.09 K/UL (ref 0–0.58)
IMM GRANULOCYTES NFR BLD: 1 % (ref 0–5)
LYMPHOCYTES NFR BLD: 0.74 K/UL (ref 1.5–4)
LYMPHOCYTES RELATIVE PERCENT: 6 % (ref 20–42)
MCH RBC QN AUTO: 26.7 PG (ref 26–35)
MCHC RBC AUTO-ENTMCNC: 32.2 G/DL (ref 32–34.5)
MCV RBC AUTO: 83 FL (ref 80–99.9)
MONOCYTES NFR BLD: 0.86 K/UL (ref 0.1–0.95)
MONOCYTES NFR BLD: 7 % (ref 2–12)
NEUTROPHILS NFR BLD: 87 % (ref 43–80)
NEUTS SEG NFR BLD: 11.22 K/UL (ref 1.8–7.3)
PLATELET # BLD AUTO: 538 K/UL (ref 130–450)
PMV BLD AUTO: 9.6 FL (ref 7–12)
POTASSIUM SERPL-SCNC: 3.7 MMOL/L (ref 3.5–5)
PROT SERPL-MCNC: 8.4 G/DL (ref 6.4–8.3)
RBC # BLD AUTO: 4.23 M/UL (ref 3.5–5.5)
SODIUM SERPL-SCNC: 137 MMOL/L (ref 132–146)
WBC OTHER # BLD: 12.9 K/UL (ref 4.5–11.5)

## 2025-05-11 PROCEDURE — 6360000002 HC RX W HCPCS

## 2025-05-11 PROCEDURE — 73700 CT LOWER EXTREMITY W/O DYE: CPT

## 2025-05-11 PROCEDURE — 80053 COMPREHEN METABOLIC PANEL: CPT

## 2025-05-11 PROCEDURE — 6360000002 HC RX W HCPCS: Performed by: EMERGENCY MEDICINE

## 2025-05-11 PROCEDURE — 96376 TX/PRO/DX INJ SAME DRUG ADON: CPT

## 2025-05-11 PROCEDURE — 99284 EMERGENCY DEPT VISIT MOD MDM: CPT

## 2025-05-11 PROCEDURE — 85025 COMPLETE CBC W/AUTO DIFF WBC: CPT

## 2025-05-11 PROCEDURE — 96375 TX/PRO/DX INJ NEW DRUG ADDON: CPT

## 2025-05-11 PROCEDURE — 96374 THER/PROPH/DIAG INJ IV PUSH: CPT

## 2025-05-11 RX ORDER — FENTANYL CITRATE 50 UG/ML
50 INJECTION, SOLUTION INTRAMUSCULAR; INTRAVENOUS ONCE
Status: COMPLETED | OUTPATIENT
Start: 2025-05-11 | End: 2025-05-11

## 2025-05-11 RX ADMIN — Medication 1 MG: at 20:19

## 2025-05-11 RX ADMIN — HYDROMORPHONE HYDROCHLORIDE 1 MG: 1 INJECTION, SOLUTION INTRAMUSCULAR; INTRAVENOUS; SUBCUTANEOUS at 16:18

## 2025-05-11 RX ADMIN — HYDROMORPHONE HYDROCHLORIDE 1 MG: 1 INJECTION, SOLUTION INTRAMUSCULAR; INTRAVENOUS; SUBCUTANEOUS at 20:19

## 2025-05-11 RX ADMIN — FENTANYL CITRATE 50 MCG: 50 INJECTION INTRAMUSCULAR; INTRAVENOUS at 15:20

## 2025-05-11 ASSESSMENT — PAIN DESCRIPTION - ORIENTATION: ORIENTATION: RIGHT

## 2025-05-11 ASSESSMENT — PAIN SCALES - GENERAL
PAINLEVEL_OUTOF10: 10
PAINLEVEL_OUTOF10: 3
PAINLEVEL_OUTOF10: 8

## 2025-05-11 ASSESSMENT — PAIN DESCRIPTION - DESCRIPTORS: DESCRIPTORS: SHARP;SHOOTING;TIGHTNESS

## 2025-05-11 ASSESSMENT — PAIN DESCRIPTION - LOCATION: LOCATION: HIP

## 2025-05-11 NOTE — ED PROVIDER NOTES
[facility] EMERGENCY DEPARTMENT  EMERGENCY DEPARTMENT ENCOUNTER        Pt Name: Clau De La Vega  MRN: [id number]  Birthdate 1950  Date of evaluation: 5/11/2025  Provider: Roger Vance DO  PCP: Kelly Shaw MD  Note Started: 1:55 PM EDT 5/11/25    CHIEF COMPLAINT       Chief Complaint   Patient presents with    Hip Pain     Right hip pain. Patient had hip replacement in 2023 and had staph infection post-surgery.       HISTORY OF PRESENT ILLNESS: 1 or more Elements   History From: Patient        Clau De La Vega is a 74 y.o. female who presents to the ER from home with chief complaint of right-sided hip pain.  Patient states the pain is chronic present since 2023 at that time she underwent right total hip replacement.  Since then patient has had chronic pain followed by pain management and orthopedic surgery at [facility].  Her medications include gabapentin 400 mg 3 times daily, Norco  mg up to 4 times daily.  Has required 4 times daily since last week.  Patient states she saw her primary care physician last week shortly afterwards she started developing acute worsening of her pain.  Denies any recent trauma related to this increased pain.  He is finding it difficult to ambulate as a result.  Pain 10 out of 10 located to her right hip and groin radiating down her leg towards her knee.  Pain is worsened by movements, patient also attempts to improve pain with icing and heating with limited success.  Patient reports no other acute concerns denies any chest pain shortness of breath abdominal pain nausea vomiting diarrhea fevers chills night sweats.    Nursing Notes were all reviewed and agreed with or any disagreements were addressed in the HPI.        REVIEW OF EXTERNAL NOTES :         5/6/25: PCP note,   Right hip osteoarthritis status post right hip arthroscopy plasty.  Today she presents with concern of right hip pain that has been ongoing for several months.

## 2025-05-23 ENCOUNTER — TELEPHONE (OUTPATIENT)
Dept: FAMILY MEDICINE CLINIC | Age: 75
End: 2025-05-23

## 2025-05-23 NOTE — TELEPHONE ENCOUNTER
Clau called in and she wanted to let you know that Dr. Page has put her on a morphine patch to try and help with her hip pain.  This was added on to her current regiment.    Thank you

## 2025-05-27 ENCOUNTER — HOSPITAL ENCOUNTER (INPATIENT)
Age: 75
LOS: 2 days | Discharge: ANOTHER HOSPITAL | DRG: 349 | End: 2025-05-30
Attending: STUDENT IN AN ORGANIZED HEALTH CARE EDUCATION/TRAINING PROGRAM | Admitting: FAMILY MEDICINE
Payer: COMMERCIAL

## 2025-05-27 ENCOUNTER — APPOINTMENT (OUTPATIENT)
Dept: GENERAL RADIOLOGY | Age: 75
DRG: 349 | End: 2025-05-27
Payer: COMMERCIAL

## 2025-05-27 DIAGNOSIS — B95.61 STAPHYLOCOCCUS AUREUS BACTEREMIA: ICD-10-CM

## 2025-05-27 DIAGNOSIS — T84.51XA INFECTION ASSOCIATED WITH INTERNAL RIGHT HIP PROSTHESIS, INITIAL ENCOUNTER: Primary | ICD-10-CM

## 2025-05-27 DIAGNOSIS — R01.1 MURMUR: ICD-10-CM

## 2025-05-27 DIAGNOSIS — R78.81 STAPHYLOCOCCUS AUREUS BACTEREMIA: ICD-10-CM

## 2025-05-27 PROCEDURE — 99285 EMERGENCY DEPT VISIT HI MDM: CPT

## 2025-05-27 PROCEDURE — 96374 THER/PROPH/DIAG INJ IV PUSH: CPT

## 2025-05-27 PROCEDURE — 73502 X-RAY EXAM HIP UNI 2-3 VIEWS: CPT

## 2025-05-27 PROCEDURE — 6360000002 HC RX W HCPCS: Performed by: STUDENT IN AN ORGANIZED HEALTH CARE EDUCATION/TRAINING PROGRAM

## 2025-05-27 RX ORDER — FENTANYL CITRATE 50 UG/ML
50 INJECTION, SOLUTION INTRAMUSCULAR; INTRAVENOUS ONCE
Status: COMPLETED | OUTPATIENT
Start: 2025-05-27 | End: 2025-05-27

## 2025-05-27 RX ADMIN — FENTANYL CITRATE 50 MCG: 50 INJECTION INTRAMUSCULAR; INTRAVENOUS at 20:40

## 2025-05-27 ASSESSMENT — PAIN DESCRIPTION - ORIENTATION
ORIENTATION: RIGHT
ORIENTATION: RIGHT

## 2025-05-27 ASSESSMENT — PAIN DESCRIPTION - DESCRIPTORS
DESCRIPTORS: SHARP
DESCRIPTORS: ACHING;JABBING;SHOOTING

## 2025-05-27 ASSESSMENT — PAIN DESCRIPTION - LOCATION
LOCATION: HIP
LOCATION: HIP

## 2025-05-27 ASSESSMENT — ENCOUNTER SYMPTOMS
ABDOMINAL PAIN: 0
COLOR CHANGE: 0
NAUSEA: 0
COUGH: 0
BACK PAIN: 0
DIARRHEA: 0
VOMITING: 0
SHORTNESS OF BREATH: 0

## 2025-05-27 ASSESSMENT — PAIN SCALES - GENERAL
PAINLEVEL_OUTOF10: 10
PAINLEVEL_OUTOF10: 10

## 2025-05-27 ASSESSMENT — PAIN - FUNCTIONAL ASSESSMENT: PAIN_FUNCTIONAL_ASSESSMENT: 0-10

## 2025-05-28 ENCOUNTER — APPOINTMENT (OUTPATIENT)
Dept: CT IMAGING | Age: 75
DRG: 349 | End: 2025-05-28
Attending: STUDENT IN AN ORGANIZED HEALTH CARE EDUCATION/TRAINING PROGRAM
Payer: COMMERCIAL

## 2025-05-28 PROBLEM — M86.9 OSTEOMYELITIS (HCC): Status: ACTIVE | Noted: 2025-05-28

## 2025-05-28 PROBLEM — T84.51XA INFECTION OF RIGHT PROSTHETIC HIP JOINT: Status: ACTIVE | Noted: 2025-05-28

## 2025-05-28 LAB
25(OH)D3 SERPL-MCNC: 34.8 NG/ML (ref 30–100)
ALBUMIN SERPL-MCNC: 2.6 G/DL (ref 3.5–5.2)
ALP SERPL-CCNC: 154 U/L (ref 35–104)
ALT SERPL-CCNC: 140 U/L (ref 0–35)
ANION GAP SERPL CALCULATED.3IONS-SCNC: 16 MMOL/L (ref 7–16)
AST SERPL-CCNC: 156 U/L (ref 0–35)
BACTERIA URNS QL MICRO: ABNORMAL
BASOPHILS # BLD: 0 K/UL (ref 0–0.2)
BASOPHILS # BLD: 0 K/UL (ref 0–0.2)
BASOPHILS NFR BLD: 0 % (ref 0–2)
BASOPHILS NFR BLD: 0 % (ref 0–2)
BILIRUB SERPL-MCNC: 0.6 MG/DL (ref 0–1.2)
BILIRUB UR QL STRIP: NEGATIVE
BUN SERPL-MCNC: 29 MG/DL (ref 8–23)
CALCIUM SERPL-MCNC: 10.5 MG/DL (ref 8.8–10.2)
CHLORIDE SERPL-SCNC: 106 MMOL/L (ref 98–107)
CHOLEST SERPL-MCNC: 81 MG/DL
CK SERPL-CCNC: 65 U/L (ref 0–170)
CLARITY UR: CLEAR
CO2 SERPL-SCNC: 21 MMOL/L (ref 22–29)
COLOR UR: YELLOW
CREAT SERPL-MCNC: 1.1 MG/DL (ref 0.5–1)
CREAT UR-MCNC: 75.7 MG/DL (ref 29–226)
CRP SERPL HS-MCNC: 443 MG/L (ref 0–5)
EOSINOPHIL # BLD: 0 K/UL (ref 0.05–0.5)
EOSINOPHIL # BLD: 0 K/UL (ref 0.05–0.5)
EOSINOPHILS RELATIVE PERCENT: 0 % (ref 0–6)
EOSINOPHILS RELATIVE PERCENT: 0 % (ref 0–6)
EPI CELLS #/AREA URNS HPF: ABNORMAL /HPF
ERYTHROCYTE [DISTWIDTH] IN BLOOD BY AUTOMATED COUNT: 17.8 % (ref 11.5–15)
ERYTHROCYTE [DISTWIDTH] IN BLOOD BY AUTOMATED COUNT: 18.3 % (ref 11.5–15)
ERYTHROCYTE [SEDIMENTATION RATE] IN BLOOD BY WESTERGREN METHOD: 136 MM/HR (ref 0–20)
FERRITIN SERPL-MCNC: 1403 NG/ML
FOLATE SERPL-MCNC: 18.6 NG/ML (ref 4.6–34.8)
GFR, ESTIMATED: 56 ML/MIN/1.73M2
GGT SERPL-CCNC: 76 U/L (ref 5–36)
GLUCOSE SERPL-MCNC: 108 MG/DL (ref 74–99)
GLUCOSE UR STRIP-MCNC: NEGATIVE MG/DL
HAV IGM SERPL QL IA: NONREACTIVE
HBA1C MFR BLD: 5.8 % (ref 4–5.6)
HBV CORE IGM SERPL QL IA: NONREACTIVE
HBV SURFACE AG SERPL QL IA: NONREACTIVE
HCT VFR BLD AUTO: 23.7 % (ref 34–48)
HCT VFR BLD AUTO: 26.4 % (ref 34–48)
HCV AB SERPL QL IA: NONREACTIVE
HDLC SERPL-MCNC: 19 MG/DL
HGB BLD-MCNC: 8.3 G/DL (ref 11.5–15.5)
HGB BLD-MCNC: 9.1 G/DL (ref 11.5–15.5)
HGB UR QL STRIP.AUTO: ABNORMAL
INR PPP: 1.6
IRON SATN MFR SERPL: 9 % (ref 15–50)
IRON SERPL-MCNC: 11 UG/DL (ref 37–145)
KETONES UR STRIP-MCNC: ABNORMAL MG/DL
LDLC SERPL CALC-MCNC: 38 MG/DL
LEUKOCYTE ESTERASE UR QL STRIP: ABNORMAL
LYMPHOCYTES NFR BLD: 0.82 K/UL (ref 1.5–4)
LYMPHOCYTES NFR BLD: 1.79 K/UL (ref 1.5–4)
LYMPHOCYTES RELATIVE PERCENT: 4 % (ref 20–42)
LYMPHOCYTES RELATIVE PERCENT: 9 % (ref 20–42)
MCH RBC QN AUTO: 25.3 PG (ref 26–35)
MCH RBC QN AUTO: 25.4 PG (ref 26–35)
MCHC RBC AUTO-ENTMCNC: 34.5 G/DL (ref 32–34.5)
MCHC RBC AUTO-ENTMCNC: 35 G/DL (ref 32–34.5)
MCV RBC AUTO: 72.5 FL (ref 80–99.9)
MCV RBC AUTO: 73.3 FL (ref 80–99.9)
MONOCYTES NFR BLD: 16 % (ref 2–12)
MONOCYTES NFR BLD: 19 % (ref 2–12)
MONOCYTES NFR BLD: 3.22 K/UL (ref 0.1–0.95)
MONOCYTES NFR BLD: 3.91 K/UL (ref 0.1–0.95)
MYELOCYTES ABSOLUTE COUNT: 0.36 K/UL
MYELOCYTES: 2 %
NEUTROPHILS NFR BLD: 74 % (ref 43–80)
NEUTROPHILS NFR BLD: 77 % (ref 43–80)
NEUTS SEG NFR BLD: 15.23 K/UL (ref 1.8–7.3)
NEUTS SEG NFR BLD: 15.86 K/UL (ref 1.8–7.3)
NITRITE UR QL STRIP: NEGATIVE
NUCLEATED RED BLOOD CELLS: 5 PER 100 WBC
OSMOLALITY UR: 389 MOSM/KG (ref 300–900)
PH UR STRIP: 5.5 [PH] (ref 5–8)
PLATELET # BLD AUTO: 893 K/UL (ref 130–450)
PLATELET # BLD AUTO: 967 K/UL (ref 130–450)
PMV BLD AUTO: 10.6 FL (ref 7–12)
PMV BLD AUTO: 9.8 FL (ref 7–12)
POTASSIUM SERPL-SCNC: 4.7 MMOL/L (ref 3.5–5.1)
PROT SERPL-MCNC: 7.6 G/DL (ref 6.4–8.3)
PROT UR STRIP-MCNC: ABNORMAL MG/DL
PROTHROMBIN TIME: 17.5 SEC (ref 9.3–12.4)
RBC # BLD AUTO: 3.27 M/UL (ref 3.5–5.5)
RBC # BLD AUTO: 3.6 M/UL (ref 3.5–5.5)
RBC # BLD: ABNORMAL 10*6/UL
RBC #/AREA URNS HPF: ABNORMAL /HPF
SODIUM SERPL-SCNC: 143 MMOL/L (ref 136–145)
SODIUM UR-SCNC: <20 MMOL/L
SP GR UR STRIP: 1.01 (ref 1–1.03)
TIBC SERPL-MCNC: 133 UG/DL (ref 250–450)
TRIGL SERPL-MCNC: 121 MG/DL
UROBILINOGEN UR STRIP-ACNC: 1 EU/DL (ref 0–1)
VIT B12 SERPL-MCNC: 1342 PG/ML (ref 232–1245)
VLDLC SERPL CALC-MCNC: 24 MG/DL
WBC # BLD: ABNORMAL 10*3/UL
WBC #/AREA URNS HPF: ABNORMAL /HPF
WBC OTHER # BLD: 20.6 K/UL (ref 4.5–11.5)
WBC OTHER # BLD: 20.6 K/UL (ref 4.5–11.5)

## 2025-05-28 PROCEDURE — 99222 1ST HOSP IP/OBS MODERATE 55: CPT | Performed by: FAMILY MEDICINE

## 2025-05-28 PROCEDURE — 82570 ASSAY OF URINE CREATININE: CPT

## 2025-05-28 PROCEDURE — 83540 ASSAY OF IRON: CPT

## 2025-05-28 PROCEDURE — 80061 LIPID PANEL: CPT

## 2025-05-28 PROCEDURE — 82607 VITAMIN B-12: CPT

## 2025-05-28 PROCEDURE — 85025 COMPLETE CBC W/AUTO DIFF WBC: CPT

## 2025-05-28 PROCEDURE — 2500000003 HC RX 250 WO HCPCS

## 2025-05-28 PROCEDURE — 84080 ASSAY ALKALINE PHOSPHATASES: CPT

## 2025-05-28 PROCEDURE — 82977 ASSAY OF GGT: CPT

## 2025-05-28 PROCEDURE — 80053 COMPREHEN METABOLIC PANEL: CPT

## 2025-05-28 PROCEDURE — 84075 ASSAY ALKALINE PHOSPHATASE: CPT

## 2025-05-28 PROCEDURE — 6370000000 HC RX 637 (ALT 250 FOR IP)

## 2025-05-28 PROCEDURE — 87150 DNA/RNA AMPLIFIED PROBE: CPT

## 2025-05-28 PROCEDURE — 80074 ACUTE HEPATITIS PANEL: CPT

## 2025-05-28 PROCEDURE — 82550 ASSAY OF CK (CPK): CPT

## 2025-05-28 PROCEDURE — 86403 PARTICLE AGGLUT ANTBDY SCRN: CPT

## 2025-05-28 PROCEDURE — 86140 C-REACTIVE PROTEIN: CPT

## 2025-05-28 PROCEDURE — 73700 CT LOWER EXTREMITY W/O DYE: CPT

## 2025-05-28 PROCEDURE — 85610 PROTHROMBIN TIME: CPT

## 2025-05-28 PROCEDURE — 82306 VITAMIN D 25 HYDROXY: CPT

## 2025-05-28 PROCEDURE — 83550 IRON BINDING TEST: CPT

## 2025-05-28 PROCEDURE — 6370000000 HC RX 637 (ALT 250 FOR IP): Performed by: STUDENT IN AN ORGANIZED HEALTH CARE EDUCATION/TRAINING PROGRAM

## 2025-05-28 PROCEDURE — 83935 ASSAY OF URINE OSMOLALITY: CPT

## 2025-05-28 PROCEDURE — 84300 ASSAY OF URINE SODIUM: CPT

## 2025-05-28 PROCEDURE — 87086 URINE CULTURE/COLONY COUNT: CPT

## 2025-05-28 PROCEDURE — 2580000003 HC RX 258

## 2025-05-28 PROCEDURE — 87040 BLOOD CULTURE FOR BACTERIA: CPT

## 2025-05-28 PROCEDURE — 83036 HEMOGLOBIN GLYCOSYLATED A1C: CPT

## 2025-05-28 PROCEDURE — 81001 URINALYSIS AUTO W/SCOPE: CPT

## 2025-05-28 PROCEDURE — 82728 ASSAY OF FERRITIN: CPT

## 2025-05-28 PROCEDURE — 82746 ASSAY OF FOLIC ACID SERUM: CPT

## 2025-05-28 PROCEDURE — 1200000000 HC SEMI PRIVATE

## 2025-05-28 PROCEDURE — 87077 CULTURE AEROBIC IDENTIFY: CPT

## 2025-05-28 PROCEDURE — 85652 RBC SED RATE AUTOMATED: CPT

## 2025-05-28 RX ORDER — METHOTREXATE 2.5 MG/1
20 TABLET ORAL WEEKLY
Status: DISCONTINUED | OUTPATIENT
Start: 2025-06-03 | End: 2025-05-30 | Stop reason: HOSPADM

## 2025-05-28 RX ORDER — SODIUM CHLORIDE 0.9 % (FLUSH) 0.9 %
5-40 SYRINGE (ML) INJECTION PRN
Status: DISCONTINUED | OUTPATIENT
Start: 2025-05-28 | End: 2025-05-30 | Stop reason: HOSPADM

## 2025-05-28 RX ORDER — HYDROCODONE BITARTRATE AND ACETAMINOPHEN 5; 325 MG/1; MG/1
1 TABLET ORAL ONCE
Status: COMPLETED | OUTPATIENT
Start: 2025-05-28 | End: 2025-05-28

## 2025-05-28 RX ORDER — 0.9 % SODIUM CHLORIDE 0.9 %
500 INTRAVENOUS SOLUTION INTRAVENOUS ONCE
Status: COMPLETED | OUTPATIENT
Start: 2025-05-28 | End: 2025-05-28

## 2025-05-28 RX ORDER — ONDANSETRON 2 MG/ML
4 INJECTION INTRAMUSCULAR; INTRAVENOUS EVERY 6 HOURS PRN
Status: DISCONTINUED | OUTPATIENT
Start: 2025-05-28 | End: 2025-05-30 | Stop reason: HOSPADM

## 2025-05-28 RX ORDER — PANTOPRAZOLE SODIUM 40 MG/1
40 TABLET, DELAYED RELEASE ORAL
Status: DISCONTINUED | OUTPATIENT
Start: 2025-05-28 | End: 2025-05-30 | Stop reason: HOSPADM

## 2025-05-28 RX ORDER — SENNA AND DOCUSATE SODIUM 50; 8.6 MG/1; MG/1
1 TABLET, FILM COATED ORAL EVERY EVENING
Status: DISCONTINUED | OUTPATIENT
Start: 2025-05-28 | End: 2025-05-30 | Stop reason: HOSPADM

## 2025-05-28 RX ORDER — ACETAMINOPHEN 650 MG/1
650 SUPPOSITORY RECTAL EVERY 6 HOURS PRN
Status: DISCONTINUED | OUTPATIENT
Start: 2025-05-28 | End: 2025-05-30 | Stop reason: HOSPADM

## 2025-05-28 RX ORDER — HYDROCODONE BITARTRATE AND ACETAMINOPHEN 5; 325 MG/1; MG/1
2 TABLET ORAL EVERY 4 HOURS PRN
Status: DISCONTINUED | OUTPATIENT
Start: 2025-05-28 | End: 2025-05-30 | Stop reason: HOSPADM

## 2025-05-28 RX ORDER — POLYETHYLENE GLYCOL 3350 17 G/17G
17 POWDER, FOR SOLUTION ORAL DAILY
Status: DISCONTINUED | OUTPATIENT
Start: 2025-05-28 | End: 2025-05-28

## 2025-05-28 RX ORDER — ENOXAPARIN SODIUM 100 MG/ML
40 INJECTION SUBCUTANEOUS DAILY
Status: DISCONTINUED | OUTPATIENT
Start: 2025-05-28 | End: 2025-05-30 | Stop reason: HOSPADM

## 2025-05-28 RX ORDER — PREDNISONE 5 MG/1
2.5 TABLET ORAL DAILY
Status: DISCONTINUED | OUTPATIENT
Start: 2025-05-28 | End: 2025-05-30 | Stop reason: HOSPADM

## 2025-05-28 RX ORDER — FOLIC ACID 1 MG/1
1 TABLET ORAL DAILY
Status: DISCONTINUED | OUTPATIENT
Start: 2025-05-28 | End: 2025-05-30 | Stop reason: HOSPADM

## 2025-05-28 RX ORDER — GABAPENTIN 300 MG/1
300 CAPSULE ORAL 2 TIMES DAILY
Status: DISCONTINUED | OUTPATIENT
Start: 2025-05-28 | End: 2025-05-30 | Stop reason: HOSPADM

## 2025-05-28 RX ORDER — ACETAMINOPHEN 325 MG/1
650 TABLET ORAL EVERY 6 HOURS PRN
Status: DISCONTINUED | OUTPATIENT
Start: 2025-05-28 | End: 2025-05-30 | Stop reason: HOSPADM

## 2025-05-28 RX ORDER — LIDOCAINE 4 G/G
1 PATCH TOPICAL DAILY
Status: DISCONTINUED | OUTPATIENT
Start: 2025-05-28 | End: 2025-05-30 | Stop reason: HOSPADM

## 2025-05-28 RX ORDER — ONDANSETRON 4 MG/1
4 TABLET, ORALLY DISINTEGRATING ORAL EVERY 8 HOURS PRN
Status: DISCONTINUED | OUTPATIENT
Start: 2025-05-28 | End: 2025-05-30 | Stop reason: HOSPADM

## 2025-05-28 RX ORDER — M-VIT,TX,IRON,MINS/CALC/FOLIC 27MG-0.4MG
1 TABLET ORAL DAILY
COMMUNITY

## 2025-05-28 RX ORDER — MORPHINE SULFATE 15 MG/1
15 TABLET ORAL 2 TIMES DAILY
Status: ON HOLD | COMMUNITY
End: 2025-05-30 | Stop reason: HOSPADM

## 2025-05-28 RX ORDER — FERROUS SULFATE 325(65) MG
325 TABLET ORAL EVERY OTHER DAY
Status: DISCONTINUED | OUTPATIENT
Start: 2025-05-28 | End: 2025-05-30 | Stop reason: HOSPADM

## 2025-05-28 RX ORDER — METHOTREXATE 2.5 MG/1
20 TABLET ORAL
Status: DISCONTINUED | OUTPATIENT
Start: 2025-06-04 | End: 2025-05-28 | Stop reason: DRUGHIGH

## 2025-05-28 RX ORDER — POLYETHYLENE GLYCOL 3350 17 G/17G
17 POWDER, FOR SOLUTION ORAL DAILY PRN
Status: DISCONTINUED | OUTPATIENT
Start: 2025-05-28 | End: 2025-05-30 | Stop reason: HOSPADM

## 2025-05-28 RX ORDER — SODIUM CHLORIDE 0.9 % (FLUSH) 0.9 %
5-40 SYRINGE (ML) INJECTION EVERY 12 HOURS SCHEDULED
Status: DISCONTINUED | OUTPATIENT
Start: 2025-05-28 | End: 2025-05-30 | Stop reason: HOSPADM

## 2025-05-28 RX ORDER — HYDROCODONE BITARTRATE AND ACETAMINOPHEN 5; 325 MG/1; MG/1
1 TABLET ORAL EVERY 4 HOURS PRN
Status: DISCONTINUED | OUTPATIENT
Start: 2025-05-28 | End: 2025-05-30 | Stop reason: HOSPADM

## 2025-05-28 RX ORDER — ATORVASTATIN CALCIUM 20 MG/1
20 TABLET, FILM COATED ORAL NIGHTLY
Status: DISCONTINUED | OUTPATIENT
Start: 2025-05-28 | End: 2025-05-30 | Stop reason: HOSPADM

## 2025-05-28 RX ORDER — SODIUM CHLORIDE 9 MG/ML
INJECTION, SOLUTION INTRAVENOUS PRN
Status: DISCONTINUED | OUTPATIENT
Start: 2025-05-28 | End: 2025-05-30 | Stop reason: HOSPADM

## 2025-05-28 RX ORDER — AMLODIPINE BESYLATE 10 MG/1
10 TABLET ORAL DAILY
Status: DISCONTINUED | OUTPATIENT
Start: 2025-05-28 | End: 2025-05-30 | Stop reason: HOSPADM

## 2025-05-28 RX ADMIN — HYDROCODONE BITARTRATE AND ACETAMINOPHEN 1 TABLET: 5; 325 TABLET ORAL at 03:43

## 2025-05-28 RX ADMIN — PANTOPRAZOLE SODIUM 40 MG: 40 TABLET, DELAYED RELEASE ORAL at 16:47

## 2025-05-28 RX ADMIN — GABAPENTIN 300 MG: 300 CAPSULE ORAL at 19:58

## 2025-05-28 RX ADMIN — SODIUM CHLORIDE 500 ML: 0.9 INJECTION, SOLUTION INTRAVENOUS at 12:39

## 2025-05-28 RX ADMIN — ACETAMINOPHEN 650 MG: 325 TABLET ORAL at 19:58

## 2025-05-28 RX ADMIN — SODIUM CHLORIDE, PRESERVATIVE FREE 10 ML: 5 INJECTION INTRAVENOUS at 10:14

## 2025-05-28 RX ADMIN — HYDROCODONE BITARTRATE AND ACETAMINOPHEN 1 TABLET: 5; 325 TABLET ORAL at 12:33

## 2025-05-28 RX ADMIN — HYDROCODONE BITARTRATE AND ACETAMINOPHEN 1 TABLET: 5; 325 TABLET ORAL at 16:47

## 2025-05-28 RX ADMIN — SODIUM CHLORIDE, PRESERVATIVE FREE 10 ML: 5 INJECTION INTRAVENOUS at 19:59

## 2025-05-28 ASSESSMENT — PAIN DESCRIPTION - PAIN TYPE: TYPE: ACUTE PAIN

## 2025-05-28 ASSESSMENT — PAIN DESCRIPTION - LOCATION
LOCATION: HIP

## 2025-05-28 ASSESSMENT — PAIN SCALES - GENERAL
PAINLEVEL_OUTOF10: 0
PAINLEVEL_OUTOF10: 10
PAINLEVEL_OUTOF10: 8
PAINLEVEL_OUTOF10: 10
PAINLEVEL_OUTOF10: 3
PAINLEVEL_OUTOF10: 5

## 2025-05-28 ASSESSMENT — PAIN DESCRIPTION - ORIENTATION
ORIENTATION: RIGHT

## 2025-05-28 ASSESSMENT — PAIN - FUNCTIONAL ASSESSMENT
PAIN_FUNCTIONAL_ASSESSMENT: 0-10
PAIN_FUNCTIONAL_ASSESSMENT: ACTIVITIES ARE NOT PREVENTED

## 2025-05-28 ASSESSMENT — ENCOUNTER SYMPTOMS
CHEST TIGHTNESS: 0
CHOKING: 0
ABDOMINAL PAIN: 0
CONSTIPATION: 0
SHORTNESS OF BREATH: 0
TROUBLE SWALLOWING: 1
COUGH: 0
BLOOD IN STOOL: 0
DIARRHEA: 0

## 2025-05-28 ASSESSMENT — PAIN DESCRIPTION - DESCRIPTORS
DESCRIPTORS: ACHING
DESCRIPTORS: SHARP
DESCRIPTORS: ACHING;NAGGING;SORE
DESCRIPTORS: SHARP
DESCRIPTORS: CRUSHING;DISCOMFORT;SHARP

## 2025-05-28 ASSESSMENT — PAIN DESCRIPTION - FREQUENCY: FREQUENCY: CONTINUOUS

## 2025-05-28 NOTE — ED PROVIDER NOTES
HPI   This is a 74-year-old female patient presents to emergency department for evaluation of right hip pain.  Patient reporting that she was attempting to transfer out of her bed to her bedpan when she felt a sudden pop in the right hip.  She did not fall.  No head injury.  She is not able to bear weight and has associated pain with movement of the right hip.  She had previous hip replacement in 2023 by Dr. Berg at [facility].  She denies any numbness.  Review of Systems   Constitutional:  Negative for chills and fever.   HENT:  Negative for congestion.    Respiratory:  Negative for cough and shortness of breath.    Cardiovascular:  Negative for chest pain.   Gastrointestinal:  Negative for abdominal pain, diarrhea, nausea and vomiting.   Genitourinary:  Negative for difficulty urinating, dysuria and hematuria.   Musculoskeletal:  Negative for back pain.        Right hip pain   Skin:  Negative for color change.   All other systems reviewed and are negative.       Physical Exam  Vitals and nursing note reviewed.   Constitutional:       General: She is not in acute distress.  HENT:      Head: Normocephalic.      Nose: Nose normal.      Mouth/Throat:      Mouth: Mucous membranes are moist.      Pharynx: Oropharynx is clear.   Eyes:      Conjunctiva/sclera: Conjunctivae normal.   Cardiovascular:      Rate and Rhythm: Normal rate and regular rhythm.      Pulses: Normal pulses.   Pulmonary:      Effort: Pulmonary effort is normal.   Abdominal:      General: There is no distension.   Musculoskeletal:      Cervical back: Neck supple.      Right lower leg: No edema.      Left lower leg: No edema.      Comments: Right hip incision noted, well-healed, tender to palpation and pain with range of movement.  2+ DP pulses and PT pulses bilaterally   Skin:     General: Skin is warm.      Capillary Refill: Capillary refill takes less than 2 seconds.   Neurological:      General: No focal deficit present.      Mental Status: She

## 2025-05-28 NOTE — H&P
SE - Family Medicine Resident Inpatient  History and Physical    CC: right hip pain    HPI: History obtained from patient, electronic medical record.  Clau De La Vega is a 74 y.o. female with a PMH of rheumatoid arthritis, HTN, HLD, anemia and R hip OA s/p SLAVA 2023 who presents to ED for Hip Pain (Pt complaint of right hip pain when she got up to go to the bathroom and felt a pop)  .   Patient states she was transferring from the bed to bed pan when she heard a pop in her right hip and severe pain. Denies any fall or injury to her head. She has a history of right hip arthroplasty in 2023 due to severe OA after which she developed a staph infection. She has been having pain in her right hip for about a year which has been worsening lately. She goes to pain medicine and follows with orthopedic surgery at [facility]. Also endorses some pain when swallowing water but not when eating solid food or thicker liquid. Denies any SOB, chest pain, fevers, chills, weight loss, abdominal pain, constipation, diarrhea, dizziness or lightheadedness. She is a smoker for about 60 years. She states she cut back recently and only smokes 1 pack in 3-4 days. Denies alcohol or drug use. Orthopedic surgery and ID were consulted. IR was consulted for R hip aspiration. Patient would like to remain full code    ED Course:     Patient is normotensive and tachycardic, breathing well on room air. Does not appear to be in acute distress.    CMP: Cr 1.1, BUN 29, eGFR 56  CBC: WBC 20.6, Hgb 9.1, Platelets 967  Blood cx, UA, urine cx, ESR -  pending     CT right hip  1. New/increased erosive change and periosteal reaction about the proximal  right femur, concerning for osteomyelitis.  2. New avulsion fracture of the lesser trochanter.  3. Increased soft tissue asymmetry about the right hip and proximal femur,  suggesting enlargement of underlying fluid collection.    Orthopedic surgery was consulted and patient is admitted for right hip fracture

## 2025-05-28 NOTE — ED NOTES
Assisted patient to use bed pan.    [facility]  [address] [place] [address], [address]  [place] [address]    [phone]    Telephone Intake: Geriatric Assessment     - Chart Review  Has this patient been seen by our department in the last 3 years? No  Please route to provider for chart review prior to scheduling and let the caller know that this phone intake will be reviewed IF -  Pt was recently hospitalized  Pt is prescribed medications for behavior management or has a history of psychiatric hospitalization  Pt plans to attend alone    Referral source: Echo Fournier, Neurologist.    Caller who is scheduling/relationship to pt: Delfina Jett, daughter  Caller's phone number: [phone]    Reason for referral: Patient concerns , Family member concerns, and Provider concerns regarding memory concerns and behavior changes/concerns.  If there are behavioral concerns, is the pt prescribed medications to manage these? no   If so, how many? none   Has the patient ever had an inpatient psychiatric hospitalization? No,   What is the goal of the visit? Initial assessment and diagnosis;      Has the patient been seen by a Neurologist or Geriatrician? Yes; Neurologist referred to this office.   If yes, is this appointment for a second opinion? No  Has the patient ever been diagnosed with dementia? Yes; Neuro dx cognitive decline  Has the patient had an MRI NeuroQuant within the last 1 year? No, but CT is scheduled for 1/4/24.(If so, please route to provider to determine if assessment + conference are needed or if only assessment should be scheduled)      Preferred language? English  Highest education level? High School Graduate  Does the patient wear glasses? Yes   Does the patient use hearing aids? Yes      Is there a living will/healthcare POA in place/If so, who? Yes , Proxy, Delfina Jett, daughter    Does the pt/caregiver have access for a virtual visit (computer/smart phone with audio/video)? No    Caller was informed:    Please make sure the pt is accompanied by someone who knows them well / caregiver / family member to participate in this appointment.   Who will accompany the pt (name and relationship)? Delfina Jett, daughter  Phone number of person accompanying pt: [phone]  Please make sure the pt attends all appointments, including the assessment, care conference, follow-up, whether in-person or virtual.  For virtual visits, pt must be physically present in [facility].     Office packet mailed out to: [address] [facility] [id number]  Added to wait list for sooner appointments/notified that calls can be short notice (same day/day prior)? Yes

## 2025-05-28 NOTE — CONSULTS
This a very pleasant 74-year-old female with a history of rheumatoid arthritis and is approximately 3-1/2 years status post right total hip arthroplasty.  She states she has had increasing pain in her right hip over the last few months she denies having any trauma she does describe being generally debilitated over the last few months and using a bedpan in her home to go to the restroom because she cannot really get out of bed and walk to the regular restroom in her home.  She states she just feels terrible all over denies having any fever or chills.  Has had no change in her medication she states no trauma and were no falls.    Physical exam he is a very pleasant gaunt appearing 74-year-old female in no apparent distress here in the emergency department.    Again she is alert and oriented x 3 she has no pain to palpation in the cervical spine  No pain to palpation of the thoracic spine.  She is neurologically intact C4-T1.  Lumbar spine there is no pain to palpation she has a negative negative straight leg raise although she does have leg and thigh pain with extension she states coming from her hip.  On the right.    Examination the right hip has pain with flexion at 40 degrees internal rotation at 0 and external Tatian at 5 degrees.  Left hip has full painless range of motion.    Right knee has pain to palpation in the suprapatellar area there is no knee effusion her range of motion full extension 130 degrees of flexion.  Left knee range of motion full extension 125 degrees of flexion.  Again neurologically  She is intact L1-S1.    CT of the pelvis again shows erosive changes around the femoral implant with fractures of the lesser trochanter and erosion of the greater trochanter.  And lucency around the stem.    Assessment and plan  This is a 74-year-old female with multiple medical problems including rheumatoid arthritis who is about 3-1/2-year status post right total hip with probable right hip infection of the

## 2025-05-28 NOTE — PROGRESS NOTES
Spoke to bedside RN about patients ordered R hip asp. Not able to be done today d/t case load in IR. Patient to be NPO after midnight, and if ok with ordering provider to continue to hold blood thinners. No new PT/INR needed at this time. Will call to review case with bedside RN in the morning.

## 2025-05-29 ENCOUNTER — APPOINTMENT (OUTPATIENT)
Age: 75
DRG: 349 | End: 2025-05-29
Payer: COMMERCIAL

## 2025-05-29 ENCOUNTER — APPOINTMENT (OUTPATIENT)
Dept: INTERVENTIONAL RADIOLOGY/VASCULAR | Age: 75
DRG: 349 | End: 2025-05-29
Payer: COMMERCIAL

## 2025-05-29 LAB
ALBUMIN SERPL-MCNC: 2.3 G/DL (ref 3.5–5.2)
ALP SERPL-CCNC: 146 U/L (ref 35–104)
ALT SERPL-CCNC: 124 U/L (ref 0–35)
ANION GAP SERPL CALCULATED.3IONS-SCNC: 13 MMOL/L (ref 7–16)
APPEARANCE: ABNORMAL
AST SERPL-CCNC: 125 U/L (ref 0–35)
BASOPHILS # BLD: 0 K/UL (ref 0–0.2)
BASOPHILS NFR BLD: 0 % (ref 0–2)
BILIRUB SERPL-MCNC: 0.4 MG/DL (ref 0–1.2)
BODY FLD TYPE: ABNORMAL
BUN SERPL-MCNC: 31 MG/DL (ref 8–23)
CALCIUM SERPL-MCNC: 9.5 MG/DL (ref 8.8–10.2)
CHLORIDE SERPL-SCNC: 108 MMOL/L (ref 98–107)
CLOT CHECK: ABNORMAL
CO2 SERPL-SCNC: 20 MMOL/L (ref 22–29)
COLOR FLUID: ABNORMAL
CREAT SERPL-MCNC: 0.9 MG/DL (ref 0.5–1)
EOSINOPHIL # BLD: 0 K/UL (ref 0.05–0.5)
EOSINOPHILS RELATIVE PERCENT: 0 % (ref 0–6)
ERYTHROCYTE [DISTWIDTH] IN BLOOD BY AUTOMATED COUNT: 18 % (ref 11.5–15)
GFR, ESTIMATED: 66 ML/MIN/1.73M2
GLUCOSE SERPL-MCNC: 113 MG/DL (ref 74–99)
HCT VFR BLD AUTO: 24.1 % (ref 34–48)
HGB BLD-MCNC: 8.3 G/DL (ref 11.5–15.5)
INR PPP: 1.6
LYMPHOCYTE, SYNOVIAL FLUID: 0 %
LYMPHOCYTES NFR BLD: 1.93 K/UL (ref 1.5–4)
LYMPHOCYTES RELATIVE PERCENT: 10 % (ref 20–42)
MCH RBC QN AUTO: 25.2 PG (ref 26–35)
MCHC RBC AUTO-ENTMCNC: 34.4 G/DL (ref 32–34.5)
MCV RBC AUTO: 73 FL (ref 80–99.9)
MONO/MACROPHAGE FLUID: 10 %
MONOCYTES NFR BLD: 11 % (ref 2–12)
MONOCYTES NFR BLD: 2.28 K/UL (ref 0.1–0.95)
NEUTROPHIL, FLUID: 90 %
NEUTROPHILS NFR BLD: 79 % (ref 43–80)
NEUTS SEG NFR BLD: 15.98 K/UL (ref 1.8–7.3)
NUCLEATED RED BLOOD CELLS: 9 PER 100 WBC
PATH REV BLD -IMP: NORMAL
PLATELET # BLD AUTO: 861 K/UL (ref 130–450)
PMV BLD AUTO: 9.9 FL (ref 7–12)
POTASSIUM SERPL-SCNC: 4.2 MMOL/L (ref 3.5–5.1)
PROT SERPL-MCNC: 7 G/DL (ref 6.4–8.3)
PROTHROMBIN TIME: 17.1 SEC (ref 9.3–12.4)
RBC # BLD AUTO: 3.3 M/UL (ref 3.5–5.5)
RBC # BLD: ABNORMAL 10*6/UL
SODIUM SERPL-SCNC: 141 MMOL/L (ref 136–145)
TOTAL NUCLEATED CELLS SYNOVIAL: ABNORMAL CELLS/UL
WBC COUNT, SYNOVIAL FLUID: ABNORMAL CELLS/UL
WBC OTHER # BLD: 20.2 K/UL (ref 4.5–11.5)

## 2025-05-29 PROCEDURE — 6370000000 HC RX 637 (ALT 250 FOR IP)

## 2025-05-29 PROCEDURE — 2709999900 IR ASP ABSCESS/HEMATOMA/BULLA/CYST

## 2025-05-29 PROCEDURE — 87070 CULTURE OTHR SPECIMN AEROBIC: CPT

## 2025-05-29 PROCEDURE — 86850 RBC ANTIBODY SCREEN: CPT

## 2025-05-29 PROCEDURE — 2500000003 HC RX 250 WO HCPCS: Performed by: INTERNAL MEDICINE

## 2025-05-29 PROCEDURE — 36415 COLL VENOUS BLD VENIPUNCTURE: CPT

## 2025-05-29 PROCEDURE — 85025 COMPLETE CBC W/AUTO DIFF WBC: CPT

## 2025-05-29 PROCEDURE — 2580000003 HC RX 258

## 2025-05-29 PROCEDURE — 93306 TTE W/DOPPLER COMPLETE: CPT | Performed by: INTERNAL MEDICINE

## 2025-05-29 PROCEDURE — 2500000003 HC RX 250 WO HCPCS

## 2025-05-29 PROCEDURE — 87102 FUNGUS ISOLATION CULTURE: CPT

## 2025-05-29 PROCEDURE — 87075 CULTR BACTERIA EXCEPT BLOOD: CPT

## 2025-05-29 PROCEDURE — 36430 TRANSFUSION BLD/BLD COMPNT: CPT

## 2025-05-29 PROCEDURE — 86923 COMPATIBILITY TEST ELECTRIC: CPT

## 2025-05-29 PROCEDURE — 87205 SMEAR GRAM STAIN: CPT

## 2025-05-29 PROCEDURE — 99232 SBSQ HOSP IP/OBS MODERATE 35: CPT | Performed by: FAMILY MEDICINE

## 2025-05-29 PROCEDURE — 77002 NEEDLE LOCALIZATION BY XRAY: CPT

## 2025-05-29 PROCEDURE — 89060 EXAM SYNOVIAL FLUID CRYSTALS: CPT

## 2025-05-29 PROCEDURE — 86403 PARTICLE AGGLUT ANTBDY SCRN: CPT

## 2025-05-29 PROCEDURE — P9016 RBC LEUKOCYTES REDUCED: HCPCS

## 2025-05-29 PROCEDURE — 80053 COMPREHEN METABOLIC PANEL: CPT

## 2025-05-29 PROCEDURE — 1200000000 HC SEMI PRIVATE

## 2025-05-29 PROCEDURE — 85610 PROTHROMBIN TIME: CPT

## 2025-05-29 PROCEDURE — 88305 TISSUE EXAM BY PATHOLOGIST: CPT

## 2025-05-29 PROCEDURE — 87040 BLOOD CULTURE FOR BACTERIA: CPT

## 2025-05-29 PROCEDURE — 6360000002 HC RX W HCPCS

## 2025-05-29 PROCEDURE — 6360000002 HC RX W HCPCS: Performed by: INTERNAL MEDICINE

## 2025-05-29 PROCEDURE — 89051 BODY FLUID CELL COUNT: CPT

## 2025-05-29 PROCEDURE — 93306 TTE W/DOPPLER COMPLETE: CPT

## 2025-05-29 PROCEDURE — 88112 CYTOPATH CELL ENHANCE TECH: CPT

## 2025-05-29 PROCEDURE — 86901 BLOOD TYPING SEROLOGIC RH(D): CPT

## 2025-05-29 PROCEDURE — 30233N1 TRANSFUSION OF NONAUTOLOGOUS RED BLOOD CELLS INTO PERIPHERAL VEIN, PERCUTANEOUS APPROACH: ICD-10-PCS | Performed by: FAMILY MEDICINE

## 2025-05-29 PROCEDURE — 86900 BLOOD TYPING SEROLOGIC ABO: CPT

## 2025-05-29 PROCEDURE — 10160 PNXR ASPIR ABSC HMTMA BULLA: CPT

## 2025-05-29 PROCEDURE — 0S993ZZ DRAINAGE OF RIGHT HIP JOINT, PERCUTANEOUS APPROACH: ICD-10-PCS

## 2025-05-29 RX ORDER — SODIUM CHLORIDE 9 MG/ML
INJECTION, SOLUTION INTRAVENOUS PRN
Status: DISCONTINUED | OUTPATIENT
Start: 2025-05-29 | End: 2025-05-30 | Stop reason: HOSPADM

## 2025-05-29 RX ADMIN — FOLIC ACID 1 MG: 1 TABLET ORAL at 08:33

## 2025-05-29 RX ADMIN — HYDROCODONE BITARTRATE AND ACETAMINOPHEN 2 TABLET: 5; 325 TABLET ORAL at 08:38

## 2025-05-29 RX ADMIN — AMLODIPINE BESYLATE 10 MG: 10 TABLET ORAL at 08:33

## 2025-05-29 RX ADMIN — HYDROCODONE BITARTRATE AND ACETAMINOPHEN 2 TABLET: 5; 325 TABLET ORAL at 14:04

## 2025-05-29 RX ADMIN — GABAPENTIN 300 MG: 300 CAPSULE ORAL at 20:57

## 2025-05-29 RX ADMIN — WATER 2000 MG: 1 INJECTION INTRAMUSCULAR; INTRAVENOUS; SUBCUTANEOUS at 16:09

## 2025-05-29 RX ADMIN — SODIUM CHLORIDE 1500 MG: 0.9 INJECTION, SOLUTION INTRAVENOUS at 02:46

## 2025-05-29 RX ADMIN — PANTOPRAZOLE SODIUM 40 MG: 40 TABLET, DELAYED RELEASE ORAL at 16:09

## 2025-05-29 RX ADMIN — HYDROCODONE BITARTRATE AND ACETAMINOPHEN 2 TABLET: 5; 325 TABLET ORAL at 03:05

## 2025-05-29 RX ADMIN — SODIUM CHLORIDE, PRESERVATIVE FREE 10 ML: 5 INJECTION INTRAVENOUS at 20:57

## 2025-05-29 RX ADMIN — SODIUM CHLORIDE 1250 MG: 0.9 INJECTION, SOLUTION INTRAVENOUS at 21:30

## 2025-05-29 RX ADMIN — GABAPENTIN 300 MG: 300 CAPSULE ORAL at 08:33

## 2025-05-29 RX ADMIN — SENNOSIDES AND DOCUSATE SODIUM 1 TABLET: 8.6; 5 TABLET ORAL at 18:19

## 2025-05-29 RX ADMIN — PREDNISONE 2.5 MG: 5 TABLET ORAL at 08:33

## 2025-05-29 RX ADMIN — SODIUM CHLORIDE, PRESERVATIVE FREE 10 ML: 5 INJECTION INTRAVENOUS at 08:33

## 2025-05-29 ASSESSMENT — PAIN SCALES - GENERAL
PAINLEVEL_OUTOF10: 7
PAINLEVEL_OUTOF10: 0
PAINLEVEL_OUTOF10: 8
PAINLEVEL_OUTOF10: 0
PAINLEVEL_OUTOF10: 6
PAINLEVEL_OUTOF10: 0
PAINLEVEL_OUTOF10: 5
PAINLEVEL_OUTOF10: 10

## 2025-05-29 ASSESSMENT — PAIN - FUNCTIONAL ASSESSMENT: PAIN_FUNCTIONAL_ASSESSMENT: ACTIVITIES ARE NOT PREVENTED

## 2025-05-29 ASSESSMENT — PAIN DESCRIPTION - ORIENTATION
ORIENTATION: RIGHT

## 2025-05-29 ASSESSMENT — PAIN DESCRIPTION - LOCATION
LOCATION: HIP

## 2025-05-29 ASSESSMENT — PAIN DESCRIPTION - DESCRIPTORS
DESCRIPTORS: ACHING;DISCOMFORT;DULL
DESCRIPTORS: STABBING
DESCRIPTORS: ACHING;SORE;DISCOMFORT

## 2025-05-29 NOTE — PROCEDURES
Procedure Note  ______________________________________________________________      IR/FL  JOINT ASPIRATION  SEYZ 8WE MED SURG ONC    Patient Name: Clau De La Vega   YOB: 1950  Medical Record Number: [id number]  Date of Procedure: 5/29/25  Room/Bed: [address] [place], [place] [address]    Preoperative diagnosis: right Hip Joint pain.    Postoperative diagnosis: Same.    CONSENT:  INFORMED CONSENT WAS OBTAINED BY patient, RISK AND BENEFITS WERE DISCUSSED.    Procedure: fluoroscopic-guided aspiration of right Hip Joint    Anesthesia: Local anesthesia with approximately 10 mL of 2% Lidocaine without epinephrine administered subcutaneously.    Performed by: MG Uriarte under on-site supervision by Haily Kat MD    Estimated blood loss: Minimal    Complications: None    Implants: None    Procedure details: After obtaining consent, a \"Time-Out\" was called to verify the correct patient, procedure/location, allergies, relevant medications held for procedure and that all equipment is functioning and available. The site was prepped and draped in sterile fashion.  Intermittent fluoroscopy was used to select and garrick a suitable access site on the skin. Subsequently, 1% lidocaine was subcutaneously administered for local anesthesia.  Under fluoroscopic guidance, a 20-gauge spinal needle was inserted into the right Hip Joint capsule. Images were saved into PACs. See radiology dictation in PACs for image review and additional procedural information.  Iodinated contrast was injected confirming needle tip position within the joint.  Through the needle 25cc of purulent yellow colored joint fluid was aspirated . The needle was removed, and the puncture site was covered with a dressing.  Specimen was sent to lab. The patient tolerated the procedure and left the department in stable condition.      The above findings were reported to [facility] Resident on call and Dr. Shyam Berg via phone at 1:45pm on 5/29/25    Thank you for allowing

## 2025-05-29 NOTE — PROGRESS NOTES
Pharmacy Consultation Note  (Antibiotic Dosing and Monitoring)    Initial consult date: 5/29/25  Consulting physician/provider: Madison Amaral MD   Drug: Vancomycin  Indication: Bacteremia    Age/  Gender Height Weight IBW  Allergy Information   74 y.o./female 147.3 cm (4' 10\") 61.2 kg (135 lb)     Ideal body weight: 47.1 kg (103 lb 14.5 oz)  Adjusted ideal body weight: 52.8 kg (116 lb 5.5 oz)   Latex, Codeine, Lisinopril-hydrochlorothiazide, Ultram [tramadol hcl], and Percocet [oxycodone-acetaminophen]      Renal Function:  Recent Labs     05/28/25  0322   BUN 29*   CREATININE 1.1*     No intake or output data in the 24 hours ending 05/29/25 0158    Vancomycin Monitoring:  Trough:  No results for input(s): \"VANCOTROUGH\" in the last 72 hours.  Random:  No results for input(s): \"VANCORANDOM\" in the last 72 hours.    Vancomycin Administration Times:  Recent vancomycin administrations        No vancomycin IV orders with administrations found.            Orders not given:            vancomycin (VANCOCIN) 1,500 mg in sodium chloride 0.9 % 250 mL IVPB ([id number])                    Assessment:  Patient is a 74 y.o. female who has been initiated on vancomycin  Estimated Creatinine Clearance: 38 mL/min (A) (based on SCr of 1.1 mg/dL (H)).  To dose vancomycin, pharmacy will be utilizing dosing based off of levels because of patient's renal impairment/insufficiency  5/29: Scr 1.1 (potential YANNA per admitting note).     Plan:  Addendum: Scr improved to 0.9. Will initiate Vancomycin 1250 mg q24H [516/14.8]  for treatment of bacteremia. Vancomycin level scheduled for 5/30 with AM labs.   Vancomycin 1500 mg loading dose now. Will evaluate Scr trend before starting regimen.   Will check vancomycin levels when appropriate  Will continue to monitor renal function   Pharmacy to follow      Byron Gomez, MartinD, [facility]      SEB: [phone]  SEY: [phone]  SJW: [phone]

## 2025-05-29 NOTE — H&P
[facility] - Family Medicine Inpatient   Resident Progress Note    S:  Hospital day: 1   Brief Synopsis: Clau De La Vega is a 74 y.o. female with a PMH of RA, HTN, HLD, anemia and R hip OA s/p SLAVA 2023 who presents to ED for Hip Pain and a popping sensation when she was transferring from the bed to bed pan. No fall or head injuries. She has a history of right hip arthroplasty in 2023 due to severe OA after which she developed a staph infection. Also endorses some pain when swallowing water but not when eating solid food or thicker liquid. Orthopedic surgery, IR and ID were consulted. In the ED, work-up was remarkable for YANNA, leukocytosis, anemia and thrombocytosis. CT of right hip showed concerns for osteomyelitis, new avulsion fracture of the lesser trochanter and enlargement of fluid collection of right him and femur. Patient was admitted for right hip fracture and osteomyelitis.    Overnight/interim:   Blood cx came back positive for staph aureus, ID on call was consulted and patient was started on Vancomycin.  Patient is scheduled for IR aspiration of right hip  Denies dizziness, lightheadedness, CP, SOB, abdominal pain, nausea, vomiting      Cont meds:    sodium chloride       Scheduled meds:    vancomycin  1,250 mg IntraVENous Q24H    sodium chloride flush  5-40 mL IntraVENous 2 times per day    [Held by provider] enoxaparin  40 mg SubCUTAneous Daily    amLODIPine  10 mg Oral Daily    [Held by provider] atorvastatin  20 mg Oral Nightly    lidocaine  1 patch TransDERmal Daily    ferrous sulfate  325 mg Oral Every Other Day    folic acid  1 mg Oral Daily    gabapentin  300 mg Oral BID    pantoprazole  40 mg Oral BID AC    predniSONE  2.5 mg Oral Daily    sennosides-docusate sodium  1 tablet Oral QPM    [Held by provider] methotrexate  20 mg Oral Weekly     PRN meds: sodium chloride flush, sodium chloride, ondansetron **OR** ondansetron, acetaminophen **OR** acetaminophen, HYDROcodone 5 mg - acetaminophen **OR**

## 2025-05-29 NOTE — CARE COORDINATION
JUSTO transition of care.  Patient presented to [facility] ER secondary to right hip pain.  Admitted inpatient with osteomyelitis.  Orthopedics and ID consulted.  IV vancomycin q 24.  S/p IR guided right hip aspiration today with 25 ml thick white fluid removed.  Cultures in process.  Hbg 8.3.  Ortho planning surgical procedure with significant blood loss.  1 Unit PRBC ordered.  Met with patient at the bedside to discuss discharge planning.  Patient lives alone in a 2 story home with first floor set up.  Her family lives close and check in on her frequently.  PTA to hip hurting she reports being independent.  She owns a cane, walker and BSC.  Has used [facility] in the past.  PCP is at the [facility] and uses [facility] on [address].  ID spoke with patient regarding recommendation to transfer to [facility].  She reports she will speak with her family today but will most likely agree to transfer.  Ambulance form completed and placed on the soft chart.  CM/SW to follow.  Roderick Stinson RN  [phone]    Case Management Assessment  Initial Evaluation    Date/Time of Evaluation: 5/29/2025 3:57 PM  Assessment Completed by: Ginette Stinson RN    If patient is discharged prior to next notation, then this note serves as note for discharge by case management.    Patient Name: Clau De La Vega                   YOB: 1950  Diagnosis: Osteomyelitis (HCC) [M86.9]  Infection associated with internal right hip prosthesis, initial encounter [T84.51XA]                   Date / Time: 5/27/2025  8:06 PM    Patient Admission Status: Inpatient   Readmission Risk (Low < 19, Mod (19-27), High > 27): Readmission Risk Score: 16.7    Current PCP: Kelly Shaw MD  PCP verified by CM? Yes    Chart Reviewed: Yes      History Provided by: Patient  Patient Orientation: Alert and Oriented    Patient Cognition: Alert    Hospitalization in the last 30 days (Readmission):  No    If yes, Readmission Assessment in

## 2025-05-29 NOTE — PLAN OF CARE
This FM resident was informed of positive blood cultures 1/4 bottles with gram positive cocci in clusters. Original plan was to hold off on abx pending culture results of joint aspiration; aspiration to take place by IR in the morning on 5/29. Spoke with on call ID attending who was on board to start Vancomycin for positive blood cultures. Vancomycin was started and Pharmacy was consulted for dosing.     Electronically signed by Madison Amaral MD on 5/29/2025 at 1:50 AM

## 2025-05-29 NOTE — PROGRESS NOTES
Better pain control, no headaches, no nausea    Objective: right hip: pain with ROM, warmth to touch, mild erythema  No calf swelling negative Homans sign    Blood cultures are positive for Gram positive cocci    Right hip aspirated for purulent material    A/P  1 IV Abx per ID  2. Pain meds  3. Lovenox  4 Discussed case with medicine and ID as I believe this patient should be transferred to a [facility] due to the Bacteremia, several chronic medical diseases and a periprosthetic rightr hip infection with Osteomyeolitis.  5. Transfuse 1 unit of PRBC.

## 2025-05-29 NOTE — PROCEDURES
PROCEDURE NOTE  Date: 5/29/2025   Name: Clau De La Vega  YOB: 1950    Procedures        Attempted echo pt was not in room. Will try again at a later time if possible.

## 2025-05-29 NOTE — PROGRESS NOTES
[facility] - Family Medicine Inpatient   Resident Progress Note    S:  Hospital day: 1   Brief Synopsis: Clau De La Vega is a 74 y.o. female with a PMH of RA, HTN, HLD, anemia and R hip OA s/p SLAVA 2023 who presents to ED for Hip Pain and a popping sensation when she was transferring from the bed to bed pan. No fall or head injuries. She has a history of right hip arthroplasty in 2023 due to severe OA after which she developed a staph infection. Also endorses some pain when swallowing water but not when eating solid food or thicker liquid. Orthopedic surgery, IR and ID were consulted. In the ED, work-up was remarkable for YANNA, leukocytosis, anemia and thrombocytosis. CT of right hip showed concerns for osteomyelitis, new avulsion fracture of the lesser trochanter and enlargement of fluid collection of right him and femur. Patient was admitted for right hip fracture and osteomyelitis.    Overnight/interim:   Blood cx came back positive for staph aureus, ID on call was consulted and patient was started on Vancomycin.  Patient is scheduled for IR aspiration of right hip  Denies dizziness, lightheadedness, CP, SOB, abdominal pain, nausea, vomiting      Cont meds:    sodium chloride       Scheduled meds:    vancomycin  1,250 mg IntraVENous Q24H    sodium chloride flush  5-40 mL IntraVENous 2 times per day    [Held by provider] enoxaparin  40 mg SubCUTAneous Daily    amLODIPine  10 mg Oral Daily    [Held by provider] atorvastatin  20 mg Oral Nightly    lidocaine  1 patch TransDERmal Daily    ferrous sulfate  325 mg Oral Every Other Day    folic acid  1 mg Oral Daily    gabapentin  300 mg Oral BID    pantoprazole  40 mg Oral BID AC    predniSONE  2.5 mg Oral Daily    sennosides-docusate sodium  1 tablet Oral QPM    [Held by provider] methotrexate  20 mg Oral Weekly     PRN meds: sodium chloride flush, sodium chloride, ondansetron **OR** ondansetron, acetaminophen **OR** acetaminophen, HYDROcodone 5 mg - acetaminophen **OR**

## 2025-05-29 NOTE — ACP (ADVANCE CARE PLANNING)
Advance Care Planning   Healthcare Decision Maker:    Primary Decision Maker: Skinny De La Vega - Child - [phone]    Primary Decision Maker: Steve De La Vega - Child - [phone]    Click here to complete Healthcare Decision Makers including selection of the Healthcare Decision Maker Relationship (ie \"Primary\").  Today we documented Decision Maker(s) consistent with Legal Next of Kin hierarchy.       If the relationship to the patient does NOT follow our state's Next of Kin hierarchy, the patient MUST complete an ACP Document to allow him/her to act on the patient's behalf. :

## 2025-05-30 VITALS
HEART RATE: 83 BPM | SYSTOLIC BLOOD PRESSURE: 127 MMHG | BODY MASS INDEX: 26.24 KG/M2 | OXYGEN SATURATION: 98 % | TEMPERATURE: 97.4 F | RESPIRATION RATE: 17 BRPM | HEIGHT: 58 IN | DIASTOLIC BLOOD PRESSURE: 62 MMHG | WEIGHT: 125 LBS

## 2025-05-30 LAB
ABO/RH: NORMAL
ACB COMPLEX DNA BLD POS QL NAA+NON-PROBE: NOT DETECTED
ALBUMIN SERPL-MCNC: 2.2 G/DL (ref 3.5–5.2)
ALK PHOS BONE SPECIFIC: 58 U/L (ref 0–55)
ALK PHOS OTHER CALC: 0 U/L
ALK PHOSPHATASE: 157 U/L (ref 40–120)
ALKALINE PHOSPHATASE LIVER FRACTION: 99 U/L (ref 0–94)
ALP SERPL-CCNC: 201 U/L (ref 35–104)
ALT SERPL-CCNC: 75 U/L (ref 0–32)
ANION GAP SERPL CALCULATED.3IONS-SCNC: 15 MMOL/L (ref 7–16)
ANTIBODY SCREEN: NEGATIVE
ARM BAND NUMBER: NORMAL
AST SERPL-CCNC: 76 U/L (ref 0–31)
B FRAGILIS DNA BLD POS QL NAA+NON-PROBE: NOT DETECTED
BASOPHILS # BLD: 0 K/UL (ref 0–0.2)
BASOPHILS NFR BLD: 0 % (ref 0–2)
BILIRUB SERPL-MCNC: 0.4 MG/DL (ref 0–1.2)
BIOFIRE TEST COMMENT: ABNORMAL
BLOOD BANK BLOOD PRODUCT EXPIRATION DATE: NORMAL
BLOOD BANK DISPENSE STATUS: NORMAL
BLOOD BANK ISBT PRODUCT BLOOD TYPE: 6200
BLOOD BANK PRODUCT CODE: NORMAL
BLOOD BANK SAMPLE EXPIRATION: NORMAL
BLOOD BANK UNIT TYPE AND RH: NORMAL
BPU ID: NORMAL
BUN SERPL-MCNC: 23 MG/DL (ref 6–23)
C ALBICANS DNA BLD POS QL NAA+NON-PROBE: NOT DETECTED
C AURIS DNA BLD POS QL NAA+NON-PROBE: NOT DETECTED
C GATTII+NEOFOR DNA BLD POS QL NAA+N-PRB: NOT DETECTED
C GLABRATA DNA BLD POS QL NAA+NON-PROBE: NOT DETECTED
C KRUSEI DNA BLD POS QL NAA+NON-PROBE: NOT DETECTED
C PARAP DNA BLD POS QL NAA+NON-PROBE: NOT DETECTED
C TROPICLS DNA BLD POS QL NAA+NON-PROBE: NOT DETECTED
CALCIUM SERPL-MCNC: 9.3 MG/DL (ref 8.6–10.2)
CHLORIDE SERPL-SCNC: 105 MMOL/L (ref 98–107)
CO2 SERPL-SCNC: 18 MMOL/L (ref 22–29)
COMPONENT: NORMAL
CREAT SERPL-MCNC: 0.9 MG/DL (ref 0.5–1)
CROSSMATCH RESULT: NORMAL
CRYSTALS FLD MICRO: NORMAL
E CLOAC COMP DNA BLD POS NAA+NON-PROBE: NOT DETECTED
E COLI DNA BLD POS QL NAA+NON-PROBE: NOT DETECTED
E FAECALIS DNA BLD POS QL NAA+NON-PROBE: NOT DETECTED
E FAECIUM DNA BLD POS QL NAA+NON-PROBE: NOT DETECTED
ECHO AO ASC DIAM: 2.3 CM
ECHO AO ASCENDING AORTA INDEX: 1.49 CM/M2
ECHO AV AREA PEAK VELOCITY: 2.5 CM2
ECHO AV AREA VTI: 2.3 CM2
ECHO AV AREA/BSA PEAK VELOCITY: 1.6 CM2/M2
ECHO AV AREA/BSA VTI: 1.5 CM2/M2
ECHO AV CUSP MM: 1.7 CM
ECHO AV MEAN GRADIENT: 20 MMHG
ECHO AV MEAN VELOCITY: 2 M/S
ECHO AV PEAK GRADIENT: 47 MMHG
ECHO AV PEAK VELOCITY: 3.4 M/S
ECHO AV VELOCITY RATIO: 1.24
ECHO AV VTI: 53.4 CM
ECHO BSA: 1.58 M2
ECHO LA DIAMETER INDEX: 2.01 CM/M2
ECHO LA DIAMETER: 3.1 CM
ECHO LA VOL A-L A2C: 49 ML (ref 22–52)
ECHO LA VOL A-L A4C: 35 ML (ref 22–52)
ECHO LA VOL BP: 39 ML (ref 22–52)
ECHO LA VOL MOD A2C: 46 ML (ref 22–52)
ECHO LA VOL MOD A4C: 34 ML (ref 22–52)
ECHO LA VOL/BSA BIPLANE: 25 ML/M2 (ref 16–34)
ECHO LA VOLUME AREA LENGTH: 42 ML
ECHO LA VOLUME INDEX A-L A2C: 32 ML/M2 (ref 16–34)
ECHO LA VOLUME INDEX A-L A4C: 23 ML/M2 (ref 16–34)
ECHO LA VOLUME INDEX AREA LENGTH: 27 ML/M2 (ref 16–34)
ECHO LA VOLUME INDEX MOD A2C: 30 ML/M2 (ref 16–34)
ECHO LA VOLUME INDEX MOD A4C: 22 ML/M2 (ref 16–34)
ECHO LV EDV A2C: 50 ML
ECHO LV EDV A4C: 60 ML
ECHO LV EDV BP: 55 ML (ref 56–104)
ECHO LV EDV INDEX A4C: 39 ML/M2
ECHO LV EDV INDEX BP: 36 ML/M2
ECHO LV EDV NDEX A2C: 32 ML/M2
ECHO LV EJECTION FRACTION 3D: 65 %
ECHO LV EJECTION FRACTION A2C: 73 %
ECHO LV EJECTION FRACTION A4C: 72 %
ECHO LV EJECTION FRACTION BIPLANE: 73 % (ref 55–100)
ECHO LV ESV A2C: 13 ML
ECHO LV ESV A4C: 17 ML
ECHO LV ESV BP: 15 ML (ref 19–49)
ECHO LV ESV INDEX A2C: 8 ML/M2
ECHO LV ESV INDEX A4C: 11 ML/M2
ECHO LV ESV INDEX BP: 10 ML/M2
ECHO LV FRACTIONAL SHORTENING: 33 % (ref 28–44)
ECHO LV INTERNAL DIMENSION DIASTOLE INDEX: 2.53 CM/M2
ECHO LV INTERNAL DIMENSION DIASTOLIC: 3.9 CM (ref 3.9–5.3)
ECHO LV INTERNAL DIMENSION SYSTOLIC INDEX: 1.69 CM/M2
ECHO LV INTERNAL DIMENSION SYSTOLIC: 2.6 CM
ECHO LV ISOVOLUMETRIC RELAXATION TIME (IVRT): 64.7 MS
ECHO LV IVSD: 1.4 CM (ref 0.6–0.9)
ECHO LV IVSS: 1.5 CM
ECHO LV MASS 2D: 201.5 G (ref 67–162)
ECHO LV MASS INDEX 2D: 130.8 G/M2 (ref 43–95)
ECHO LV POSTERIOR WALL DIASTOLIC: 1.4 CM (ref 0.6–0.9)
ECHO LV POSTERIOR WALL SYSTOLIC: 1.6 CM
ECHO LV RELATIVE WALL THICKNESS RATIO: 0.72
ECHO LVOT AREA: 2.8 CM2
ECHO LVOT AV VTI INDEX: 0.78
ECHO LVOT DIAM: 1.9 CM
ECHO LVOT MEAN GRADIENT: 18 MMHG
ECHO LVOT PEAK GRADIENT: 72 MMHG
ECHO LVOT PEAK VELOCITY: 4.2 M/S
ECHO LVOT STROKE VOLUME INDEX: 76.7 ML/M2
ECHO LVOT SV: 118.2 ML
ECHO LVOT VTI: 41.7 CM
ECHO MV "A" WAVE DURATION: 118 MSEC
ECHO MV A VELOCITY: 1.44 M/S
ECHO MV AREA PHT: 3.8 CM2
ECHO MV AREA VTI: 4.4 CM2
ECHO MV E DECELERATION TIME (DT): 204.9 MS
ECHO MV E VELOCITY: 0.69 M/S
ECHO MV E/A RATIO: 0.48
ECHO MV LVOT VTI INDEX: 0.65
ECHO MV MAX VELOCITY: 1.7 M/S
ECHO MV MEAN GRADIENT: 3 MMHG
ECHO MV MEAN VELOCITY: 0.8 M/S
ECHO MV PEAK GRADIENT: 12 MMHG
ECHO MV PRESSURE HALF TIME (PHT): 58.4 MS
ECHO MV VTI: 27 CM
ECHO PV MAX VELOCITY: 1.7 M/S
ECHO PV MEAN GRADIENT: 5 MMHG
ECHO PV MEAN VELOCITY: 1.1 M/S
ECHO PV PEAK GRADIENT: 11 MMHG
ECHO PV VTI: 26.2 CM
ECHO RV INTERNAL DIMENSION: 1.1 CM
ECHO RV LONGITUDINAL DIMENSION: 5.7 CM
ECHO RV MID DIMENSION: 1.3 CM
ECHO RVOT MEAN GRADIENT: 2 MMHG
ECHO RVOT PEAK GRADIENT: 4 MMHG
ECHO RVOT PEAK VELOCITY: 1 M/S
ECHO RVOT VTI: 16.2 CM
ECHO TV REGURGITANT MAX VELOCITY: 3.16 M/S
ECHO TV REGURGITANT PEAK GRADIENT: 40 MMHG
ENTEROBACTERALES DNA BLD POS NAA+N-PRB: NOT DETECTED
EOSINOPHIL # BLD: 0 K/UL (ref 0.05–0.5)
EOSINOPHILS RELATIVE PERCENT: 0 % (ref 0–6)
ERYTHROCYTE [DISTWIDTH] IN BLOOD BY AUTOMATED COUNT: 17.6 % (ref 11.5–15)
GFR, ESTIMATED: 70 ML/MIN/1.73M2
GLUCOSE SERPL-MCNC: 110 MG/DL (ref 74–99)
GP B STREP DNA BLD POS QL NAA+NON-PROBE: NOT DETECTED
HAEM INFLU DNA BLD POS QL NAA+NON-PROBE: NOT DETECTED
HCT VFR BLD AUTO: 29.6 % (ref 34–48)
HGB BLD-MCNC: 10.4 G/DL (ref 11.5–15.5)
HIV 1+2 AB+HIV1 P24 AG SERPL QL IA: NONREACTIVE
K OXYTOCA DNA BLD POS QL NAA+NON-PROBE: NOT DETECTED
KLEBSIELLA SP DNA BLD POS QL NAA+NON-PRB: NOT DETECTED
KLEBSIELLA SP DNA BLD POS QL NAA+NON-PRB: NOT DETECTED
L MONOCYTOG DNA BLD POS QL NAA+NON-PROBE: NOT DETECTED
LYMPHOCYTES NFR BLD: 1.92 K/UL (ref 1.5–4)
LYMPHOCYTES RELATIVE PERCENT: 8 % (ref 20–42)
MCH RBC QN AUTO: 26.2 PG (ref 26–35)
MCHC RBC AUTO-ENTMCNC: 35.1 G/DL (ref 32–34.5)
MCV RBC AUTO: 74.6 FL (ref 80–99.9)
MECA+MECC+MREJ ISLT/SPM QL: NOT DETECTED
METAMYELOCYTES ABSOLUTE COUNT: 0.21 K/UL (ref 0–0.12)
METAMYELOCYTES: 1 % (ref 0–1)
MICROORGANISM SPEC CULT: ABNORMAL
MICROORGANISM SPEC CULT: ABNORMAL
MICROORGANISM/AGENT SPEC: ABNORMAL
MICROORGANISM/AGENT SPEC: ABNORMAL
MONOCYTES NFR BLD: 12 % (ref 2–12)
MONOCYTES NFR BLD: 2.98 K/UL (ref 0.1–0.95)
MYELOCYTES ABSOLUTE COUNT: 0.21 K/UL
MYELOCYTES: 1 %
N MEN DNA BLD POS QL NAA+NON-PROBE: NOT DETECTED
NEUTROPHILS NFR BLD: 78 % (ref 43–80)
NEUTS SEG NFR BLD: 19.17 K/UL (ref 1.8–7.3)
NUCLEATED RED BLOOD CELLS: 10 PER 100 WBC
P AERUGINOSA DNA BLD POS NAA+NON-PROBE: NOT DETECTED
PATH INTERP FLD-IMP: NORMAL
PLATELET # BLD AUTO: 805 K/UL (ref 130–450)
PMV BLD AUTO: 10.1 FL (ref 7–12)
POTASSIUM SERPL-SCNC: 4.2 MMOL/L (ref 3.5–5)
PROT SERPL-MCNC: 7.1 G/DL (ref 6.4–8.3)
PROTEUS SP DNA BLD POS QL NAA+NON-PROBE: NOT DETECTED
RBC # BLD AUTO: 3.97 M/UL (ref 3.5–5.5)
RBC # BLD: ABNORMAL 10*6/UL
S AUREUS DNA BLD POS QL NAA+NON-PROBE: DETECTED
S AUREUS+CONS DNA BLD POS NAA+NON-PROBE: DETECTED
S EPIDERMIDIS DNA BLD POS QL NAA+NON-PRB: NOT DETECTED
S LUGDUNENSIS DNA BLD POS QL NAA+NON-PRB: NOT DETECTED
S MALTOPHILIA DNA BLD POS QL NAA+NON-PRB: NOT DETECTED
S MARCESCENS DNA BLD POS NAA+NON-PROBE: NOT DETECTED
S PNEUM DNA BLD POS QL NAA+NON-PROBE: NOT DETECTED
S PYO DNA BLD POS QL NAA+NON-PROBE: NOT DETECTED
SALMONELLA DNA BLD POS QL NAA+NON-PROBE: NOT DETECTED
SERVICE CMNT-IMP: ABNORMAL
SERVICE CMNT-IMP: ABNORMAL
SODIUM SERPL-SCNC: 138 MMOL/L (ref 132–146)
SPECIMEN DESCRIPTION: ABNORMAL
SPECIMEN DESCRIPTION: ABNORMAL
SPECIMEN TYPE: NORMAL
STREPTOCOCCUS DNA BLD POS NAA+NON-PROBE: NOT DETECTED
TRANSFUSION STATUS: NORMAL
UNIT DIVISION: 0
UNIT ISSUE DATE/TIME: NORMAL
VANCOMYCIN SERPL-MCNC: 31.9 UG/ML (ref 5–40)
WBC OTHER # BLD: 24.5 K/UL (ref 4.5–11.5)

## 2025-05-30 PROCEDURE — 99232 SBSQ HOSP IP/OBS MODERATE 35: CPT | Performed by: FAMILY MEDICINE

## 2025-05-30 PROCEDURE — 2500000003 HC RX 250 WO HCPCS

## 2025-05-30 PROCEDURE — 85025 COMPLETE CBC W/AUTO DIFF WBC: CPT

## 2025-05-30 PROCEDURE — 80202 ASSAY OF VANCOMYCIN: CPT

## 2025-05-30 PROCEDURE — 36415 COLL VENOUS BLD VENIPUNCTURE: CPT

## 2025-05-30 PROCEDURE — 2500000003 HC RX 250 WO HCPCS: Performed by: INTERNAL MEDICINE

## 2025-05-30 PROCEDURE — 80053 COMPREHEN METABOLIC PANEL: CPT

## 2025-05-30 PROCEDURE — 6370000000 HC RX 637 (ALT 250 FOR IP)

## 2025-05-30 PROCEDURE — 6360000002 HC RX W HCPCS: Performed by: INTERNAL MEDICINE

## 2025-05-30 PROCEDURE — 87389 HIV-1 AG W/HIV-1&-2 AB AG IA: CPT

## 2025-05-30 RX ORDER — PANTOPRAZOLE SODIUM 40 MG/1
40 TABLET, DELAYED RELEASE ORAL DAILY
Qty: 30 TABLET | Refills: 0 | Status: CANCELLED | OUTPATIENT
Start: 2025-05-30

## 2025-05-30 RX ADMIN — PANTOPRAZOLE SODIUM 40 MG: 40 TABLET, DELAYED RELEASE ORAL at 06:05

## 2025-05-30 RX ADMIN — SODIUM CHLORIDE, PRESERVATIVE FREE 10 ML: 5 INJECTION INTRAVENOUS at 09:05

## 2025-05-30 RX ADMIN — FOLIC ACID 1 MG: 1 TABLET ORAL at 09:04

## 2025-05-30 RX ADMIN — WATER 2000 MG: 1 INJECTION INTRAMUSCULAR; INTRAVENOUS; SUBCUTANEOUS at 00:34

## 2025-05-30 RX ADMIN — HYDROCODONE BITARTRATE AND ACETAMINOPHEN 2 TABLET: 5; 325 TABLET ORAL at 03:31

## 2025-05-30 RX ADMIN — AMLODIPINE BESYLATE 10 MG: 10 TABLET ORAL at 09:05

## 2025-05-30 RX ADMIN — WATER 2000 MG: 1 INJECTION INTRAMUSCULAR; INTRAVENOUS; SUBCUTANEOUS at 16:03

## 2025-05-30 RX ADMIN — WATER 2000 MG: 1 INJECTION INTRAMUSCULAR; INTRAVENOUS; SUBCUTANEOUS at 09:04

## 2025-05-30 RX ADMIN — FERROUS SULFATE TAB 325 MG (65 MG ELEMENTAL FE) 325 MG: 325 (65 FE) TAB at 09:04

## 2025-05-30 RX ADMIN — PREDNISONE 2.5 MG: 5 TABLET ORAL at 09:04

## 2025-05-30 RX ADMIN — HYDROCODONE BITARTRATE AND ACETAMINOPHEN 2 TABLET: 5; 325 TABLET ORAL at 09:03

## 2025-05-30 RX ADMIN — PANTOPRAZOLE SODIUM 40 MG: 40 TABLET, DELAYED RELEASE ORAL at 16:03

## 2025-05-30 RX ADMIN — HYDROCODONE BITARTRATE AND ACETAMINOPHEN 2 TABLET: 5; 325 TABLET ORAL at 15:41

## 2025-05-30 RX ADMIN — GABAPENTIN 300 MG: 300 CAPSULE ORAL at 09:04

## 2025-05-30 ASSESSMENT — PAIN DESCRIPTION - PAIN TYPE: TYPE: SURGICAL PAIN

## 2025-05-30 ASSESSMENT — PAIN DESCRIPTION - ORIENTATION
ORIENTATION: RIGHT

## 2025-05-30 ASSESSMENT — PAIN DESCRIPTION - LOCATION
LOCATION: HIP

## 2025-05-30 ASSESSMENT — PAIN - FUNCTIONAL ASSESSMENT: PAIN_FUNCTIONAL_ASSESSMENT: ACTIVITIES ARE NOT PREVENTED

## 2025-05-30 ASSESSMENT — PAIN SCALES - GENERAL
PAINLEVEL_OUTOF10: 9
PAINLEVEL_OUTOF10: 9
PAINLEVEL_OUTOF10: 0
PAINLEVEL_OUTOF10: 2
PAINLEVEL_OUTOF10: 5
PAINLEVEL_OUTOF10: 9

## 2025-05-30 ASSESSMENT — PAIN DESCRIPTION - DESCRIPTORS
DESCRIPTORS: SHARP;STABBING
DESCRIPTORS: ACHING;BURNING;STABBING;THROBBING
DESCRIPTORS: SHARP;SHOOTING

## 2025-05-30 ASSESSMENT — PAIN DESCRIPTION - FREQUENCY: FREQUENCY: CONTINUOUS

## 2025-05-30 ASSESSMENT — PAIN DESCRIPTION - ONSET: ONSET: ON-GOING

## 2025-05-30 NOTE — CARE COORDINATION
CM update note.  Orthopedics recommending transfer to [facility].  Patient accepted as transfer to [facility] and awaiting bed assignment.  Ambulance form with envelope is on the soft chart.  ID following.  Positive blood cultures.  IV ancef q 8 and IV vancomycin q 24.  S/p 1 unit PRBC on 5/29.      1455:  patient has a bed at [facility].  Patient will go to [facility] main, [facility]\"" bed 5.  Transport set up via stretcher with [facility].   time is between 2482-0799.  Nurse will  need to call report to [phone].  Patient and her family at the bedside updated.  Roderick Stinson RN -[phone].

## 2025-05-30 NOTE — PROGRESS NOTES
[facility]  [facility]  Progress Note      Chief Complaint   Patient presents with    Hip Pain     Pt complaint of right hip pain when she got up to go to the bathroom and felt a pop       SUBJECTIVE:    Patient is tolerating medications. No reported adverse drug reactions.  No nausea, vomiting, diarrhea.    Review of systems:  As stated above in the chief complaint, otherwise negative.    Medications:  Scheduled Meds:   vancomycin  1,000 mg IntraVENous Q24H    ceFAZolin  2,000 mg IntraVENous Q8H    sodium chloride flush  5-40 mL IntraVENous 2 times per day    enoxaparin  40 mg SubCUTAneous Daily    amLODIPine  10 mg Oral Daily    [Held by provider] atorvastatin  20 mg Oral Nightly    lidocaine  1 patch TransDERmal Daily    ferrous sulfate  325 mg Oral Every Other Day    folic acid  1 mg Oral Daily    gabapentin  300 mg Oral BID    pantoprazole  40 mg Oral BID AC    predniSONE  2.5 mg Oral Daily    sennosides-docusate sodium  1 tablet Oral QPM    [Held by provider] methotrexate  20 mg Oral Weekly     Continuous Infusions:   sodium chloride      sodium chloride       PRN Meds:sodium chloride, sodium chloride flush, sodium chloride, ondansetron **OR** ondansetron, acetaminophen **OR** acetaminophen, HYDROcodone 5 mg - acetaminophen **OR** HYDROcodone 5 mg - acetaminophen, polyethylene glycol    OBJECTIVE:  /64   Pulse 84   Temp 97.3 °F (36.3 °C) (Temporal)   Resp 18   Ht 1.473 m (4' 10\")   Wt 56.7 kg (125 lb)   SpO2 95%   BMI 26.13 kg/m²   Temp  Av.6 °F (37 °C)  Min: 97.3 °F (36.3 °C)  Max: 99.5 °F (37.5 °C)  Constitutional: The patient is awake, alert, and oriented.   Skin: Warm and dry. No rashes were noted. No jaundice.  HEENT: Eyes show round, and reactive pupils. Moist mucous membranes, no ulcerations, no thrush.   Neck: Supple to movements. No lymphadenopathy.   Chest: No use of accessory muscles to breathe. Symmetrical expansion. Auscultation reveals no wheezing,

## 2025-05-30 NOTE — PROGRESS NOTES
Pharmacy Consultation Note  (Antibiotic Dosing and Monitoring)    Initial consult date: 5/29/25  Consulting physician/provider: Madison Amaral MD   Drug: Vancomycin  Indication: Bacteremia    Age/  Gender Height Weight IBW  Allergy Information   74 y.o./female 147.3 cm (4' 10\") 61.2 kg (135 lb)     Ideal body weight: 47.1 kg (103 lb 14.5 oz)  Adjusted ideal body weight: 51 kg (112 lb 5.5 oz)   Latex, Codeine, Lisinopril-hydrochlorothiazide, Ultram [tramadol hcl], and Percocet [oxycodone-acetaminophen]      Renal Function:  Recent Labs     05/28/25  0322 05/29/25  0459 05/30/25  0431   BUN 29* 31* 23   CREATININE 1.1* 0.9 0.9       Intake/Output Summary (Last 24 hours) at 5/30/2025 1015  Last data filed at 5/30/2025 0612  Gross per 24 hour   Intake 1148 ml   Output 1600 ml   Net -452 ml       Vancomycin Monitoring:  Trough:  No results for input(s): \"VANCOTROUGH\" in the last 72 hours.  Random:    Recent Labs     05/30/25  0431   VANCORANDOM 31.9       Recent vancomycin administrations                     vancomycin (VANCOCIN) 1,250 mg in sodium chloride 0.9 % 250 mL IVPB ([id number]) (mg) 1,250 mg New Bag 05/29/25 2130    vancomycin (VANCOCIN) 1,500 mg in sodium chloride 0.9 % 250 mL IVPB ([id number]) (mg) 1,500 mg New Bag 05/29/25 0246          Assessment:  Patient is a 74 y.o. female who has been initiated on vancomycin for Bacteremia  Estimated Creatinine Clearance: 45 mL/min (based on SCr of 0.9 mg/dL).  To dose vancomycin, pharmacy will be utilizing Spinal Kinetics calculation software for goal AUC/REMIGIO 400-600 mg/L-hr  5/29: Scr 1.1 (potential YANNA per admitting note).   5/30: Rm 31.9 (7hr post dose), scr 0.9; will schedule q24h dosing 5/30 @2100    Plan:  Vancomycin 1000 mg q24h (Predicted AUC/REMIGIO = 574, Tr = 16.1)  Will check vancomycin levels when appropriate  Will continue to monitor renal function   Pharmacy to follow      Thank you for this consult,    Marek Manuel, [facility] 5/30/2025 10:15 AM   [place]

## 2025-05-30 NOTE — PLAN OF CARE
Problem: Safety - Adult  Goal: Free from fall injury  5/29/2025 2240 by Jim Kwok RN  Outcome: Progressing  5/29/2025 0853 by Destiny Sears RN  Outcome: Progressing     Problem: Pain  Goal: Verbalizes/displays adequate comfort level or baseline comfort level  5/29/2025 2240 by Jim Kwok RN  Outcome: Progressing  5/29/2025 0853 by Destiny Sears RN  Outcome: Progressing     Problem: Skin/Tissue Integrity  Goal: Skin integrity remains intact  Description: 1.  Monitor for areas of redness and/or skin breakdown2.  Assess vascular access sites hourly3.  Every 4-6 hours minimum:  Change oxygen saturation probe site4.  Every 4-6 hours:  If on nasal continuous positive airway pressure, respiratory therapy assess nares and determine need for appliance change or resting period  5/29/2025 2240 by Jim Kwok RN  Outcome: Progressing  5/29/2025 0853 by Destiny Sears RN  Outcome: Progressing  Flowsheets (Taken 5/29/2025 0853)  Skin Integrity Remains Intact: Monitor for areas of redness and/or skin breakdown

## 2025-05-30 NOTE — PLAN OF CARE
Problem: Safety - Adult  Goal: Free from fall injury  5/30/2025 1023 by Arleth Hernández RN  Outcome: Progressing  Flowsheets (Taken 5/30/2025 0859)  Free From Fall Injury: Instruct family/caregiver on patient safety  5/29/2025 2240 by Jim Kwok RN  Outcome: Progressing     Problem: Pain  Goal: Verbalizes/displays adequate comfort level or baseline comfort level  5/30/2025 1023 by Arleth Hernández RN  Outcome: Progressing  5/29/2025 2240 by Jim Kwok RN  Outcome: Progressing     Problem: Skin/Tissue Integrity  Goal: Skin integrity remains intact  Description: 1.  Monitor for areas of redness and/or skin breakdown2.  Assess vascular access sites hourly3.  Every 4-6 hours minimum:  Change oxygen saturation probe site4.  Every 4-6 hours:  If on nasal continuous positive airway pressure, respiratory therapy assess nares and determine need for appliance change or resting period  5/30/2025 1023 by Arleth Hernández RN  Outcome: Progressing  Flowsheets (Taken 5/30/2025 0859)  Skin Integrity Remains Intact: Monitor for areas of redness and/or skin breakdown  5/29/2025 2240 by Jim Kwok RN  Outcome: Progressing

## 2025-05-30 NOTE — PROGRESS NOTES
Bed available at [facility], 0 bed 5. Nurse to nurse number [phone]

## 2025-05-30 NOTE — PROGRESS NOTES
Report given to CHIQUIS Yip at [facility] at [phone].  Transport to [facility] between [id number] hours.

## 2025-05-30 NOTE — DISCHARGE INSTRUCTIONS
=====================================  [facility]  DISCHARGE INSTRUCTIONS  =====================================    Take your medications as directed in this summary    Future scheduled appointments are listed below or recommended appointments are mentioned above.    Future Appointments   Date Time Provider Department Center   6/4/2025 11:30 AM [facility] CT SCAN 3 [facility] Rad/Car   6/9/2025  2:20 PM [facility] Shaw [facility]   6/16/2025  9:45 AM Ely Barry MD [facility] Surgical [facility]       Follow up with the specialists that saw you during your stay as well. If you have any questions call your PCP at [phone].    Once discharged from [facility], you can :     Return to work : Yes, you may return to work  Activity : As tolerated  Stairs : As tolerated  Exercise : As tolerated  Lifting : As tolerated   Sexual activity : Yes  Driving : With seat belt on. NO driving on narcotic pain medication if prescribed   Medications : Always take your medications as prescribed  Wound Care: none needed  Diet : You are asked to make an attempt to improve diet and exercise patterns to aid in medical management of your medical condition/problem.     Call [facility] with any further questions. Return to Emergency Department with any worsening of your condition and/or fever greater than 101 degrees, new weakness, shortness of breath or chest pain.

## 2025-05-30 NOTE — CONSULTS
[facility]  [facility]    Consultation Note     Admit Date: 5/27/2025  8:06 PM    Reason for Consult:   Right hip prosthetic joint infection and bacteremia    Attending Physician:  Gela Macedo DO     Chief Complaint: Right hip prosthetic joint infection and bacteremia    HISTORY OF PRESENT ILLNESS:   74-year-old female with past medical history of rheumatoid arthritis, HLD, HTN, anemia, status post right hip osteoarthritis, status post SLAVA 2023 presents to the ER with right hip pain.  Status post right hip aspiration of purulent material.  Blood cultures are positive for Staph aureus.  ID consulted for right hip prosthetic joint infection with bacteremia.    Past Medical History:        Diagnosis Date    Acid reflux     Analgesic rebound headache 1/8/2019    Chronic back pain     Constipation     Headache(784.0)     History of sinus problem     Hyperlipidemia     Hypertension     Memory loss, short term     Neuropathy     Osteoarthritis     Pain in neck     Rheumatoid arthritis(714.0)     Dr. Dorene Wheeler in ears     TIA (transient ischemic attack)      Past Surgical History:        Procedure Laterality Date    CARDIOVASCULAR STRESS TEST  2003    normal    COLONOSCOPY  2006    Normal.  Re-referred for diagnostic on 9/10    HYSTERECTOMY (CERVIX STATUS UNKNOWN)      IR ASP ABSCESS/HEMATOMA/BULLA/CYST  5/29/2025    IR ASP ABSCESS/HEMATOMA/BULLA/CYST 5/29/2025 Kirby Ko, PA [facility] SPECIAL PROCEDURES    SHOULDER SURGERY Left     SHOULDER SURGERY Right     TOOTH EXTRACTION Right October 2014    TUBAL LIGATION      1978    UPPER GASTROINTESTINAL ENDOSCOPY N/A 8/16/2023    EGD ESOPHAGOGASTRODUODENOSCOPY DILATATION performed by Estrella Ruiz MD at [facility] ENDOSCOPY     Current Medications:   Scheduled Meds:   vancomycin  1,250 mg IntraVENous Q24H    ceFAZolin  2,000 mg IntraVENous Q8H    sodium chloride flush  5-40 mL IntraVENous 2 times per day    [Held by provider] enoxaparin  40 mg  the Last Year: 0     Homeless in the Last Year: No     Tobacco: No  Alcohol: No  Pets: No  Travel: No    Family History:       Problem Relation Age of Onset    High Cholesterol Mother     Alzheimer's Disease Mother     Other Mother         AAA    Bleeding Prob Mother     Cancer Father    . Otherwise non-pertinent to the chief complaint.    REVIEW OF SYSTEMS:    CONSTITUTIONAL:  No chills, fevers or night sweats. No loss of weight.  EYES:  No double vision or drainage from eyes, ears or throat.  HEENT:  No neck stiffness. No dysphagia. No drainage from eyes, ears or throat  RESPIRATORY:  No cough, productive sputum or hemoptysis.   CARDIOVASCULAR:  No chest pain, palpitations, orthopnea or dyspnea on exertion.  GASTROINTESTINAL:  No nausea, vomiting, diarrhea or constipation or hematochezia   GENITOURINARY:  No frequency burning dysuria or hematuria.  INTEGUMENT/BREAST:  No rash or breast masses.  HEMATOLOGIC/LYMPHATIC:  No lymphadenopathy or blood dyscrasics.   ALLERGIC/IMMUNOLOGIC:  No anaphylaxis.   ENDOCRINE:  No polyuria or polydipsia or temperature intolerance.    MUSCULOSKELETAL:  No myalgia or arthralgia.  Full ROM.  NEUROLOGICAL:  No focal motor sensory deficit.  BEHAVIOR/PSYCH:  No psychosis.     PHYSICAL EXAM:    Vitals:    /62   Pulse 96   Temp 99.5 °F (37.5 °C) (Temporal)   Resp 16   Ht 1.473 m (4' 10\")   Wt 61.2 kg (135 lb)   SpO2 95%   BMI 28.22 kg/m²   Constitutional: The patient is awake, alert, and oriented.   Skin: Warm and dry. No rashes were noted. No jaundice.  HEENT: Eyes show round, and reactive pupils. Moist mucous membranes, no ulcerations, no thrush.   Neck: Supple to movements. No lymphadenopathy.   Chest: No use of accessory muscles to breathe. Symmetrical expansion. Auscultation reveals no wheezing, crackles, or rhonchi.   Cardiovascular: S1 and S2 are rhythmic and regular. No murmurs appreciated.   Abdomen: Positive bowel sounds to auscultation. Benign to palpation. No

## 2025-05-30 NOTE — PROGRESS NOTES
Subjective:    HPI:  Clau De La Vega is a 73 yo female with PMH of rheumatoid arthritis, HLD, HTN, anemia, s/p right hip osteoarthritis, s/p SLAVA 2023 who presented for R hip pain. ED workup was remarkable for YANNA, leukocytosis, anemia, and thrombocytosis. CT right hip showed concerns for osteomyelitis, new avulsion fracture of the lesser trochanter, and enlargement of fluid collection of right hip and femur. Right hip aspiration revealed purulent material. Blood cultures are positive for Staph aureus. Patient was admitted for right hip fracture and osteomyelitis.     Overnight/interim:  Patient is seen in her bed, uncomfortable in a moderate amount of pain. She states it is very painful for her to move her right upper extremity including her toes. She denies any pain in her left lower extremity. She is currently receiving IV vancomycin and cefazolin. ID and orthopedic surgery are following.     Objective:    Vitals:    05/30/25 0401   BP:    Pulse:    Resp: 18   Temp:    SpO2:       PE:   General: appears uncomfortable and in pain, in no acute distress  Heart: RRR, systolic murmur  Lungs: CTA bilaterally  Abdomen: mild abdominal tenderness likely referred from right hip.  Extremities: severe tenderness present in R lower extremity (R hip and knee) with decreased ROM due to pain    Labs:  CMP unremarkable  CBC remarkable for high WBC 24.5, low Hgb 10.4 (trending up from 8.3 after transfusion), low Hct 29.6, high platelets 805. Body fluid neutrophil count elevated 90.     Imaging:  IR abscess aspiration (5/29)  Successful fluoroscopic guided right hip aspiration with removal of 25 cc  purulent fluid.    CT Hip (5/28)  1. New/increased erosive change and periosteal reaction about the proximal  right femur, concerning for osteomyelitis.  2. New avulsion fracture of the lesser trochanter.  3. Increased soft tissue asymmetry about the right hip and proximal femur,  suggesting enlargement of underlying fluid collection.    XR

## 2025-05-30 NOTE — PROGRESS NOTES
[facility] - Family Medicine Inpatient   Resident Progress Note    S:  Hospital day: 1   Brief Synopsis: Clau De La Vega is a 74 y.o. female with a PMH of RA, HTN, HLD, anemia and R hip OA s/p SLAVA 2023 who presents to ED for Hip Pain and a popping sensation when she was transferring from the bed to bed pan. No fall or head injuries. She has a history of right hip arthroplasty in 2023 due to severe OA after which she developed a staph infection. Also endorses some pain when swallowing water but not when eating solid food or thicker liquid. Orthopedic surgery, IR and ID were consulted. In the ED, work-up was remarkable for YANNA, leukocytosis, anemia and thrombocytosis. CT of right hip showed concerns for osteomyelitis, new avulsion fracture of the lesser trochanter and enlargement of fluid collection of right him and femur. Patient was admitted for right hip fracture and osteomyelitis.  Blood cultures grew staph aureus and patient was started on vancomycin and Ancef.  Urine culture grew Proteus mirabilis.  Right hip aspiration was done 5/29 and showed thick white fluid which is growing Staph aureus.  Orthopedic surgery recommending transfer the patient to [facility] for bacteremia, complex medical history and infected right hip prosthesis with osteomyelitis.  Transfer is pending availability of bed.    Overnight/interim:   No acute overnight events  Patient is pending transfer to [place] due to infective aspirate from hip joint. Continue Vancomycin and Ancef  States pain is getting worse but manageable when she does not move her right leg.  Denies dizziness, lightheadedness, CP, SOB, abdominal pain, nausea, vomiting      Cont meds:    sodium chloride       Scheduled meds:    vancomycin  1,250 mg IntraVENous Q24H    sodium chloride flush  5-40 mL IntraVENous 2 times per day    [Held by provider] enoxaparin  40 mg SubCUTAneous Daily    amLODIPine  10 mg Oral Daily    [Held by provider] atorvastatin  20 mg Oral Nightly  (based on SCr of 0.9 mg/dL).  Other pertinent labs as noted below    Radiology:  CT HIP RIGHT WO CONTRAST   Final Result   1. New/increased erosive change and periosteal reaction about the proximal   right femur, concerning for osteomyelitis.   2. New avulsion fracture of the lesser trochanter.   3. Increased soft tissue asymmetry about the right hip and proximal femur,   suggesting enlargement of underlying fluid collection.         XR HIP 2-3 VW W PELVIS RIGHT   Final Result   Suspicious for pathologic avulsion fracture at the lesser trochanter of the   right hip.         IR INTERVENTIONAL RADIOLOGY PROCEDURE REQUEST    (Results Pending)         Resident Assessment and Plan       Right hip avulsion fracture of lesser trochanter  Proximal right femur osteomyelitis   Right hip OA s/p RHA 2023  Leukocytosis, neutrophilic predominance   Orthopedic surgery consulted, appreciate recs  Right hip aspiration 5/29-purulent material growing Staph aureus cultures  Orthopedic surgery recommends transfer to [facility] due to bacteremia, chronic medical diseases and periprosthetic right hip infection with osteomyelitis  ,   Blood cx 1/2 - positive for staph aureus, patient started on Vancomycin  IR consulted for right hip aspiration, performed 5/29  ID consulted, appreciate recs   Continue vancomycin and Ancef  TTE performed, pending read  Norco pain panel   Vit D - WNL  PT/OT pending ortho recs   Patient will need DEXA scan outpatient      YANNA - resolved  Dehydration vs medication adverse effect  Urine studies demonstrated pre-renal cause  Monitor BMP  Consider nephrology consult if fails to improve   500 ml NS bolus  UA positive for WBC, RBC, bacteria, trace Hgb, protein, ketones   Urine cx -Proteus mirabilis, patient is on cefazolin [Ancef]     Transaminatis - improving   Elevated ALP - improving   Likely 2/2 drug induced vs viral  Monitor CMP  GGT 76 (elevated) , ALP isoenzymes - pending  Hepatitis panel

## 2025-05-31 LAB
MICROORGANISM SPEC CULT: ABNORMAL
MICROORGANISM SPEC CULT: NORMAL
MICROORGANISM/AGENT SPEC: ABNORMAL
SERVICE CMNT-IMP: ABNORMAL
SERVICE CMNT-IMP: NORMAL
SPECIMEN DESCRIPTION: ABNORMAL
SPECIMEN DESCRIPTION: NORMAL

## 2025-06-01 LAB
MICROORGANISM SPEC CULT: ABNORMAL
MICROORGANISM SPEC CULT: ABNORMAL
MICROORGANISM SPEC CULT: NORMAL
MICROORGANISM/AGENT SPEC: NORMAL
SERVICE CMNT-IMP: ABNORMAL
SERVICE CMNT-IMP: NORMAL
SPECIMEN DESCRIPTION: ABNORMAL
SPECIMEN DESCRIPTION: NORMAL

## 2025-06-02 LAB — NON-GYN CYTOLOGY REPORT: NORMAL

## 2025-06-02 NOTE — DISCHARGE SUMMARY
bacteremia, complex medical history and infected right hip prosthesis with osteomyelitis.       The patient's course was otherwise uneventful. Patient progressed well, pain was controlled on PO medications. She was tolerating a regular diet with no nausea or vomiting, and was in a suitable condition for transfer to [facility].      Discharge plan:  Right hip avulsion fracture of lesser trochanter, proximal right femur osteomyelitis and periprosthetic right hip infection  Transfer for [facility] for orthopedic management  Continue follow-up with orthopedics   ID consulted - continue antibiotics for bacteremia and periprosthetic right hip infection  YANNA - continue to monitor kidney function, repeat BMP  Proteus mirabilis cystitis   Transaminitis - likely drug induced. Continue to monitor. Repeat CMP. Lipitor was held during admission, restarted after discharge.  RA - follow-up with rheumatology. Consider medication adjustment due to elevated liver enzymes  Dysphagia, GERD  Patient needs repeat EGD for intermittent dysphagia     Discharge Medications:         Medication List        START taking these medications      FeroSul 325 (65 Fe) MG tablet  Generic drug: ferrous sulfate  TAKE 1 TABLET BY MOUTH EVERY OTHER DAY     senna-docusate 8.6-50 MG per tablet  Commonly known as: Senokot S  Take 4 tablets by mouth every evening            CONTINUE taking these medications      amLODIPine 5 MG tablet  Commonly known as: NORVASC  Take 2 tablets by mouth daily     atorvastatin 20 MG tablet  Commonly known as: LIPITOR  Take 1 tablet by mouth daily     diclofenac sodium 1 % Gel  Commonly known as: VOLTAREN  Apply 2 g topically 4 times daily as needed for Pain     folic acid 1 MG tablet  Commonly known as: FOLVITE     gabapentin 400 MG capsule  Commonly known as: NEURONTIN     HYDROcodone-acetaminophen 5-325 MG per tablet  Commonly known as: NORCO     methotrexate 2.5 MG chemo tablet  Commonly known as: RHEUMATREX

## 2025-06-02 NOTE — PROGRESS NOTES
Physician Progress Note      PATIENT:               LAURA FLOR  CSN #:                  [id number]  :                       1950  ADMIT DATE:       2025 8:06 PM  DISCH DATE:        2025 9:10 PM  RESPONDING  PROVIDER #:        Theodora Goyal DO          QUERY TEXT:    Right hip prosthetic joint infection and Staph aureus bacteremia was   documented in the PN  by Arya Craft.   Please clarify any   associated diagnoses:    The clinical indicators include:  -Age 74 yrs, female, prosthetic joint infection, bacteremia.    - \"Pt presents with complaints of hip pain and found to have prosthetic joint   infection.\" (HP  by  Ambreen Love)    -\"patient should be transferred to a [facility] due to the Bacteremia,   several chronic medical diseases and a periprosthetic right hip infection with   Osteomyelitis.\" (PN  by orthopedic surgery Shyam Berg)    -\"Staph aureus bacteremia\" (PN  by Arya Craft)    -\"WBC 20.6 , 20.2 , 24.5 .\"    -\".0 \"    -\", 103 \".    -Treated with IV fluids, cefazolin, vancomycin, Serial labs-(from 'MAR' on   ).      Thank you.  Alma Mcnally [facility] CDS  Options provided:  -- Staphylococcus aureus Sepsis secondary to Right hip prosthetic joint   infection and bacteremia, present on admission  -- Right hip prosthetic joint infection and bacteremia without sepsis  -- Other - I will add my own diagnosis  -- Disagree - Not applicable / Not valid  -- Disagree - Clinically unable to determine / Unknown  -- Refer to Clinical Documentation Reviewer    PROVIDER RESPONSE TEXT:    This patient has staphylococcus aureus sepsis secondary to Right hip   prosthetic joint infection and bacteremia, present on admission    Query created by: Alma Mcnally on 2025 8:17 AM      QUERY TEXT:    Acute Kidney Injury is documented in the medical record H&P  by  Ambreen Love. Labs noted as Cr

## 2025-06-03 ENCOUNTER — TELEPHONE (OUTPATIENT)
Dept: FAMILY MEDICINE CLINIC | Age: 75
End: 2025-06-03

## 2025-06-03 LAB
MICROORGANISM SPEC CULT: ABNORMAL
MICROORGANISM/AGENT SPEC: ABNORMAL
SERVICE CMNT-IMP: ABNORMAL
SPECIMEN DESCRIPTION: ABNORMAL

## 2025-06-03 NOTE — TELEPHONE ENCOUNTER
----- Message from Jossue ARNETT sent at 6/3/2025  9:52 AM EDT -----  Regarding: ECC Message to Provider  ECC Message to Provider    Relationship to Patient: Self     Additional Information Patient called and would like to inform her PCP that she is in the [facility] right now because she is experiencing bacterial infection that went to her blood.  --------------------------------------------------------------------------------------------------------------------------    Call Back Information: OK to leave message on voicemail  Preferred Call Back Number: Phone [phone]

## 2025-06-04 LAB
MICROORGANISM SPEC CULT: NORMAL
SERVICE CMNT-IMP: NORMAL
SPECIMEN DESCRIPTION: NORMAL

## 2025-06-08 LAB
MICROORGANISM SPEC CULT: NORMAL
MICROORGANISM/AGENT SPEC: NORMAL
SERVICE CMNT-IMP: NORMAL
SPECIMEN DESCRIPTION: NORMAL

## 2025-06-19 ENCOUNTER — TRANSCRIBE ORDERS (OUTPATIENT)
Dept: NON INVASIVE DIAGNOSTICS | Age: 75
End: 2025-06-19

## 2025-06-19 DIAGNOSIS — R78.81 BACTEREMIA: Primary | ICD-10-CM

## 2025-06-25 RX ORDER — DULOXETIN HYDROCHLORIDE 20 MG/1
20 CAPSULE, DELAYED RELEASE ORAL DAILY
Status: ON HOLD | COMMUNITY

## 2025-06-25 RX ORDER — IBUPROFEN 200 MG
200 TABLET ORAL 3 TIMES DAILY
Status: ON HOLD | COMMUNITY

## 2025-06-25 RX ORDER — OXACILLIN SODIUM 10 G/100ML
2 INJECTION, POWDER, FOR SOLUTION INTRAVENOUS EVERY 4 HOURS
Status: ON HOLD | COMMUNITY

## 2025-06-25 RX ORDER — ASPIRIN 81 MG/1
81 TABLET ORAL 2 TIMES DAILY
Status: ON HOLD | COMMUNITY

## 2025-06-25 RX ORDER — FLUCONAZOLE 200 MG/1
400 TABLET ORAL DAILY
Status: ON HOLD | COMMUNITY

## 2025-06-25 RX ORDER — DIPHENHYDRAMINE HCL 25 MG
25 CAPSULE ORAL EVERY 4 HOURS PRN
Status: ON HOLD | COMMUNITY

## 2025-06-25 RX ORDER — ASCORBIC ACID 500 MG
500 TABLET ORAL 2 TIMES DAILY
Status: ON HOLD | COMMUNITY

## 2025-06-25 RX ORDER — METHOCARBAMOL 500 MG/1
500 TABLET, FILM COATED ORAL 4 TIMES DAILY PRN
Status: ON HOLD | COMMUNITY

## 2025-06-25 RX ORDER — LEVOTHYROXINE SODIUM 50 UG/1
50 TABLET ORAL DAILY
Status: ON HOLD | COMMUNITY

## 2025-06-25 RX ORDER — RIFAMPIN 300 MG/1
300 CAPSULE ORAL 2 TIMES DAILY
Status: ON HOLD | COMMUNITY

## 2025-06-25 RX ORDER — ONDANSETRON 4 MG/1
4 TABLET, FILM COATED ORAL EVERY 8 HOURS PRN
Status: ON HOLD | COMMUNITY

## 2025-06-25 RX ORDER — POLYETHYLENE GLYCOL 3350 17 G/17G
17 POWDER, FOR SOLUTION ORAL DAILY PRN
Status: ON HOLD | COMMUNITY

## 2025-06-25 RX ORDER — BISACODYL 5 MG
10 TABLET, DELAYED RELEASE (ENTERIC COATED) ORAL DAILY PRN
Status: ON HOLD | COMMUNITY

## 2025-06-25 RX ORDER — ACETAMINOPHEN 325 MG/1
650 TABLET ORAL EVERY 6 HOURS PRN
Status: ON HOLD | COMMUNITY

## 2025-06-25 RX ORDER — OXYCODONE HYDROCHLORIDE 5 MG/1
5 CAPSULE ORAL EVERY 4 HOURS PRN
Status: ON HOLD | COMMUNITY

## 2025-06-25 RX ORDER — FAMOTIDINE 20 MG/1
20 TABLET, FILM COATED ORAL DAILY
Status: ON HOLD | COMMUNITY

## 2025-06-27 ENCOUNTER — ANESTHESIA EVENT (OUTPATIENT)
Age: 75
End: 2025-06-27

## 2025-06-27 ENCOUNTER — ANESTHESIA (OUTPATIENT)
Age: 75
End: 2025-06-27

## 2025-06-27 ENCOUNTER — HOSPITAL ENCOUNTER (OUTPATIENT)
Age: 75
Discharge: HOME | End: 2025-06-29

## 2025-06-27 VITALS
HEIGHT: 58 IN | RESPIRATION RATE: 17 BRPM | BODY MASS INDEX: 27.08 KG/M2 | DIASTOLIC BLOOD PRESSURE: 61 MMHG | TEMPERATURE: 97.8 F | WEIGHT: 129 LBS | HEART RATE: 79 BPM | SYSTOLIC BLOOD PRESSURE: 140 MMHG | OXYGEN SATURATION: 95 %

## 2025-06-27 DIAGNOSIS — R78.81 BACTEREMIA: ICD-10-CM

## 2025-06-27 LAB
ECHO BSA: 1.55 M2
ECHO EST RA PRESSURE: 8 MMHG
ECHO LV EF PHYSICIAN: 65 %
ECHO PULMONARY ARTERY SYSTOLIC PRESSURE (PASP): 31 MMHG
ECHO RIGHT VENTRICULAR SYSTOLIC PRESSURE (RVSP): 31 MMHG
ECHO TV REGURGITANT MAX VELOCITY: 2.39 M/S
ECHO TV REGURGITANT PEAK GRADIENT: 23 MMHG

## 2025-06-27 PROCEDURE — 6360000002 HC RX W HCPCS

## 2025-06-27 PROCEDURE — 3700000000 HC ANESTHESIA ATTENDED CARE

## 2025-06-27 PROCEDURE — 2580000003 HC RX 258

## 2025-06-27 PROCEDURE — 7100000010 HC PHASE II RECOVERY - FIRST 15 MIN

## 2025-06-27 PROCEDURE — 7100000011 HC PHASE II RECOVERY - ADDTL 15 MIN

## 2025-06-27 PROCEDURE — 93325 DOPPLER ECHO COLOR FLOW MAPG: CPT | Performed by: INTERNAL MEDICINE

## 2025-06-27 PROCEDURE — 93312 ECHO TRANSESOPHAGEAL: CPT

## 2025-06-27 PROCEDURE — 93320 DOPPLER ECHO COMPLETE: CPT | Performed by: INTERNAL MEDICINE

## 2025-06-27 PROCEDURE — 93312 ECHO TRANSESOPHAGEAL: CPT | Performed by: INTERNAL MEDICINE

## 2025-06-27 PROCEDURE — 6370000000 HC RX 637 (ALT 250 FOR IP): Performed by: ANESTHESIOLOGY

## 2025-06-27 PROCEDURE — 3700000001 HC ADD 15 MINUTES (ANESTHESIA)

## 2025-06-27 RX ORDER — PROPOFOL 10 MG/ML
INJECTION, EMULSION INTRAVENOUS
Status: DISCONTINUED | OUTPATIENT
Start: 2025-06-27 | End: 2025-06-27 | Stop reason: SDUPTHER

## 2025-06-27 RX ORDER — OXYCODONE HYDROCHLORIDE 5 MG/1
5 TABLET ORAL ONCE
Refills: 0 | Status: COMPLETED | OUTPATIENT
Start: 2025-06-27 | End: 2025-06-27

## 2025-06-27 RX ORDER — SODIUM CHLORIDE 9 MG/ML
INJECTION, SOLUTION INTRAVENOUS
Status: DISCONTINUED | OUTPATIENT
Start: 2025-06-27 | End: 2025-06-27 | Stop reason: SDUPTHER

## 2025-06-27 RX ADMIN — PROPOFOL 130 MG: 10 INJECTION, EMULSION INTRAVENOUS at 13:37

## 2025-06-27 RX ADMIN — SODIUM CHLORIDE: 9 INJECTION, SOLUTION INTRAVENOUS at 12:00

## 2025-06-27 RX ADMIN — OXYCODONE 5 MG: 5 TABLET ORAL at 14:06

## 2025-06-27 ASSESSMENT — PAIN - FUNCTIONAL ASSESSMENT
PAIN_FUNCTIONAL_ASSESSMENT: 0-10

## 2025-06-27 ASSESSMENT — PAIN DESCRIPTION - ORIENTATION: ORIENTATION: LOWER

## 2025-06-27 ASSESSMENT — PAIN DESCRIPTION - DESCRIPTORS
DESCRIPTORS: DISCOMFORT

## 2025-06-27 ASSESSMENT — PAIN DESCRIPTION - LOCATION: LOCATION: BACK

## 2025-06-27 NOTE — PROGRESS NOTES
Nourishment provided. Care giver at bedside. Call light within reach. Awaiting transportation back to [facility]  
[facility] completed with paperwork/phone assessment completed by Eugenie Jaimes RN.      A&O:  yes  Code Status:  full code  Ambulatory:  no  Oxygen:  none  Hard of Hearing:  no  Call light:  yes  Signs Documents?  yes  POA:    Transport:  [facility]  Wounds:  yes  Isolation:  no  Lines/Drains/Implanted devices:  left midline  Trach: no    Verified arrival time of 1130 with facility.  General instructions and medication instructions faxed to facility.    
Report to Joya SIM at [facility].  
Requested return of questionnaire/patient information from [place] at [facility]. Notified of time of arrival on 6/17/25 of 1130 for procedure. MMT to transport patient the day of procedure.  
Samaria in infection control called to check if isolation is needed the day of procedure. Requested call back.    No isolation needed day of procedure per Samaria.  
Urinated 150 cc'S SUMAN COLORED URINE per bedpan.  
polish, make up, hair products, body spray, aftershave, cologne or perfume on the day of surgery    [x] Jewelry, body piercings, eyeglasses, contact lenses and dentures are not permitted into surgery (bring cases)    [] Follow bowel prep as instructed per surgeon    [x] No tobacco products within 24 hours of surgery     [x] No alcohol or illegal drug use, marijuana included, within 24 hours of surgery.    [x] You can expect a call the business day prior to procedure to notify you if your arrival time changes    [] If you receive a survey after surgery we would greatly appreciate your comments    [] Parent/guardian of a minor must accompany their child and remain on the premises  the entire time they are under our care     [] Pediatric patients may bring favorite toy, blanket or comfort item with them    [x] A caregiver or family member must remain with the patient during their stay if they are mentally handicapped, have dementia, disoriented or unable to use a call light or would be a safety concern if left unattended    []  The Outpatient Pharm[facility] is available to fill your prescription here on your day of surgery, ask your preop nurse for details    [] Other instructions    EDUCATIONAL MATERIALS PROVIDED:    [] PAT Preoperative Education Packet/Booklet     [] Medication List    [] Transfusion bracelet given to patient. Patient instructed to place on wrist or bring bracelet to the hospital day of surgery.  Patient informed that if bracelet is lost or forgotten at home, bloodwork will have to be repeated which could cause a delay in surgery.    [] Shower with soap, lather and rinse well, and use CHG wipes provided the evening before surgery as instructed    [] Incentive spirometer with instructions    All patient questions answered at this time.

## 2025-06-27 NOTE — ANESTHESIA POSTPROCEDURE EVALUATION
Department of Anesthesiology  Postprocedure Note    Patient: Clau De La Vega  MRN: [id number]  YOB: 1950  Date of evaluation: 6/27/2025    Procedure Summary       Date: 06/27/25 Room / Location: [facility] Cardiology    Anesthesia Start: 1326 Anesthesia Stop: 1349    Procedure: JANICE (PRN CONTRAST/BUBBLE/3D) Diagnosis: Bacteremia    Scheduled Providers:  Responsible Provider: Velvet Mace DO    Anesthesia Type: MAC ASA Status: 3            Anesthesia Type: MAC    Miguel Angel Phase I: Miguel Angel Score: 10    Miguel Angel Phase II:      Anesthesia Post Evaluation    Patient location during evaluation: PACU  Patient participation: complete - patient participated  Level of consciousness: awake and alert  Pain score: 0  Airway patency: patent  Nausea & Vomiting: no nausea and no vomiting  Cardiovascular status: hemodynamically stable  Respiratory status: acceptable and room air  Hydration status: stable  Pain management: adequate        No notable events documented.

## 2025-06-27 NOTE — ANESTHESIA PRE PROCEDURE
Department of Anesthesiology  Preprocedure Note       Name:  Clau De La Vega   Age:  74 y.o.  :  1950                                          MRN:  [id number]         Date:  2025      Surgeon: * No surgeons listed *    Procedure: * No procedures listed *    Medications prior to admission:   Prior to Admission medications    Medication Sig Start Date End Date Taking? Authorizing Provider   vitamin C (ASCORBIC ACID) 500 MG tablet Take 1 tablet by mouth 2 times daily   Yes Dayne Shafer MD   aspirin 81 MG EC tablet Take 1 tablet by mouth in the morning and at bedtime   Yes Dayne Shafer MD   calcium-vitamin D (OSCAL-500) 500-5 MG-MCG TABS per tablet Take 1 tablet by mouth 2 times daily   Yes Dayne Shafer MD   DULoxetine (CYMBALTA) 20 MG extended release capsule Take 1 capsule by mouth daily   Yes Dayne Shafer MD   famotidine (PEPCID) 20 MG tablet Take 1 tablet by mouth daily   Yes Dayne Shafer MD   fluconazole (DIFLUCAN) 200 MG tablet Take 2 tablets by mouth daily   Yes Dayne Shafer MD   ibuprofen (ADVIL;MOTRIN) 200 MG tablet Take 1 tablet by mouth in the morning, at noon, and at bedtime   Yes ProviderDayne MD   levothyroxine (SYNTHROID) 50 MCG tablet Take 1 tablet by mouth Daily   Yes Dayne Shafer MD   Meropenem (MERREM IV) Infuse 1 g intravenously in the morning and 1 g in the evening.   Yes Dayne Shafer MD   Oxacillin Sodium 10 g SOLR Infuse 2 g intravenously every 4 hours   Yes Dayne Shafer MD   rifAMPin (RIFADIN) 300 MG capsule Take 1 capsule by mouth 2 times daily   Yes Dayne Shafer MD   acetaminophen (TYLENOL) 325 MG tablet Take 2 tablets by mouth every 6 hours as needed for Pain   Yes ProviderDayne MD   Alum & Mag Hydroxide-Simeth (ALUMINA-MAGNESIA-SIMETHICONE PO) Take 30 mLs by mouth every 2 hours as needed   Yes Dayne Shafer MD   bisacodyl (DULCOLAX) 5 MG EC tablet Take 2 tablets by mouth

## 2025-07-09 ENCOUNTER — TRANSCRIBE ORDERS (OUTPATIENT)
Dept: ADMINISTRATIVE | Age: 75
End: 2025-07-09

## 2025-07-09 DIAGNOSIS — M25.511 RIGHT SHOULDER PAIN, UNSPECIFIED CHRONICITY: Primary | ICD-10-CM

## 2025-07-10 ENCOUNTER — HOSPITAL ENCOUNTER (OUTPATIENT)
Dept: CT IMAGING | Age: 75
Discharge: HOME | End: 2025-07-12
Payer: COMMERCIAL

## 2025-07-10 DIAGNOSIS — M25.511 RIGHT SHOULDER PAIN, UNSPECIFIED CHRONICITY: ICD-10-CM

## 2025-07-10 PROCEDURE — 73201 CT UPPER EXTREMITY W/DYE: CPT

## 2025-07-10 PROCEDURE — 6360000004 HC RX CONTRAST MEDICATION: Performed by: RADIOLOGY

## 2025-07-10 RX ORDER — IOPAMIDOL 755 MG/ML
75 INJECTION, SOLUTION INTRAVASCULAR
Status: COMPLETED | OUTPATIENT
Start: 2025-07-10 | End: 2025-07-10

## 2025-07-10 RX ADMIN — IOPAMIDOL 75 ML: 755 INJECTION, SOLUTION INTRAVENOUS at 09:05

## 2025-07-13 ENCOUNTER — HOSPITAL ENCOUNTER (INPATIENT)
Age: 75
LOS: 9 days | Discharge: ANOTHER HOSPITAL | DRG: 254 | End: 2025-07-22
Attending: EMERGENCY MEDICINE | Admitting: INTERNAL MEDICINE
Payer: COMMERCIAL

## 2025-07-13 ENCOUNTER — APPOINTMENT (OUTPATIENT)
Dept: CT IMAGING | Age: 75
DRG: 254 | End: 2025-07-13
Payer: COMMERCIAL

## 2025-07-13 ENCOUNTER — APPOINTMENT (OUTPATIENT)
Dept: ULTRASOUND IMAGING | Age: 75
DRG: 254 | End: 2025-07-13
Payer: COMMERCIAL

## 2025-07-13 DIAGNOSIS — K92.2 GASTROINTESTINAL HEMORRHAGE, UNSPECIFIED GASTROINTESTINAL HEMORRHAGE TYPE: ICD-10-CM

## 2025-07-13 DIAGNOSIS — K92.2 GIB (GASTROINTESTINAL BLEEDING): Primary | ICD-10-CM

## 2025-07-13 DIAGNOSIS — D64.9 ACUTE ON CHRONIC ANEMIA: ICD-10-CM

## 2025-07-13 LAB
ANION GAP SERPL CALCULATED.3IONS-SCNC: 13 MMOL/L (ref 7–16)
BASOPHILS # BLD: 0.05 K/UL (ref 0–0.2)
BASOPHILS NFR BLD: 0 % (ref 0–2)
BUN SERPL-MCNC: 27 MG/DL (ref 8–23)
CALCIUM SERPL-MCNC: 8.1 MG/DL (ref 8.8–10.2)
CHLORIDE SERPL-SCNC: 103 MMOL/L (ref 98–107)
CO2 SERPL-SCNC: 19 MMOL/L (ref 22–29)
CREAT SERPL-MCNC: 1.7 MG/DL (ref 0.5–1)
EOSINOPHIL # BLD: 0.14 K/UL (ref 0.05–0.5)
EOSINOPHILS RELATIVE PERCENT: 1 % (ref 0–6)
ERYTHROCYTE [DISTWIDTH] IN BLOOD BY AUTOMATED COUNT: 19.9 % (ref 11.5–15)
GFR, ESTIMATED: 32 ML/MIN/1.73M2
GLUCOSE SERPL-MCNC: 127 MG/DL (ref 74–99)
HCT VFR BLD AUTO: 16.9 % (ref 34–48)
HCT VFR BLD AUTO: 26 % (ref 34–48)
HCT VFR BLD AUTO: 28 % (ref 34–48)
HGB BLD-MCNC: 5.4 G/DL (ref 11.5–15.5)
HGB BLD-MCNC: 8.8 G/DL (ref 11.5–15.5)
HGB BLD-MCNC: 9.2 G/DL (ref 11.5–15.5)
IMM GRANULOCYTES # BLD AUTO: 0.21 K/UL (ref 0–0.58)
IMM GRANULOCYTES NFR BLD: 2 % (ref 0–5)
LYMPHOCYTES NFR BLD: 1.56 K/UL (ref 1.5–4)
LYMPHOCYTES RELATIVE PERCENT: 12 % (ref 20–42)
MCH RBC QN AUTO: 26.9 PG (ref 26–35)
MCHC RBC AUTO-ENTMCNC: 32 G/DL (ref 32–34.5)
MCV RBC AUTO: 84.1 FL (ref 80–99.9)
MONOCYTES NFR BLD: 1.36 K/UL (ref 0.1–0.95)
MONOCYTES NFR BLD: 10 % (ref 2–12)
NEUTROPHILS NFR BLD: 75 % (ref 43–80)
NEUTS SEG NFR BLD: 9.81 K/UL (ref 1.8–7.3)
PLATELET # BLD AUTO: 439 K/UL (ref 130–450)
PMV BLD AUTO: 8.7 FL (ref 7–12)
POTASSIUM SERPL-SCNC: 4 MMOL/L (ref 3.5–5.1)
RBC # BLD AUTO: 2.01 M/UL (ref 3.5–5.5)
SODIUM SERPL-SCNC: 135 MMOL/L (ref 136–145)
WBC OTHER # BLD: 13.1 K/UL (ref 4.5–11.5)

## 2025-07-13 PROCEDURE — 2580000003 HC RX 258: Performed by: INTERNAL MEDICINE

## 2025-07-13 PROCEDURE — 6360000002 HC RX W HCPCS: Performed by: EMERGENCY MEDICINE

## 2025-07-13 PROCEDURE — 96375 TX/PRO/DX INJ NEW DRUG ADDON: CPT

## 2025-07-13 PROCEDURE — 2580000003 HC RX 258: Performed by: EMERGENCY MEDICINE

## 2025-07-13 PROCEDURE — 86923 COMPATIBILITY TEST ELECTRIC: CPT

## 2025-07-13 PROCEDURE — 2060000000 HC ICU INTERMEDIATE R&B

## 2025-07-13 PROCEDURE — 93971 EXTREMITY STUDY: CPT

## 2025-07-13 PROCEDURE — 30233N1 TRANSFUSION OF NONAUTOLOGOUS RED BLOOD CELLS INTO PERIPHERAL VEIN, PERCUTANEOUS APPROACH: ICD-10-PCS | Performed by: INTERNAL MEDICINE

## 2025-07-13 PROCEDURE — 6360000002 HC RX W HCPCS: Performed by: INTERNAL MEDICINE

## 2025-07-13 PROCEDURE — 99285 EMERGENCY DEPT VISIT HI MDM: CPT

## 2025-07-13 PROCEDURE — 86850 RBC ANTIBODY SCREEN: CPT

## 2025-07-13 PROCEDURE — 2500000003 HC RX 250 WO HCPCS: Performed by: INTERNAL MEDICINE

## 2025-07-13 PROCEDURE — 85014 HEMATOCRIT: CPT

## 2025-07-13 PROCEDURE — 6370000000 HC RX 637 (ALT 250 FOR IP): Performed by: INTERNAL MEDICINE

## 2025-07-13 PROCEDURE — 36430 TRANSFUSION BLD/BLD COMPNT: CPT

## 2025-07-13 PROCEDURE — 80048 BASIC METABOLIC PNL TOTAL CA: CPT

## 2025-07-13 PROCEDURE — 85025 COMPLETE CBC W/AUTO DIFF WBC: CPT

## 2025-07-13 PROCEDURE — 96374 THER/PROPH/DIAG INJ IV PUSH: CPT

## 2025-07-13 PROCEDURE — 86901 BLOOD TYPING SEROLOGIC RH(D): CPT

## 2025-07-13 PROCEDURE — 74176 CT ABD & PELVIS W/O CONTRAST: CPT

## 2025-07-13 PROCEDURE — 86900 BLOOD TYPING SEROLOGIC ABO: CPT

## 2025-07-13 PROCEDURE — 85018 HEMOGLOBIN: CPT

## 2025-07-13 PROCEDURE — P9016 RBC LEUKOCYTES REDUCED: HCPCS

## 2025-07-13 RX ORDER — ONDANSETRON 4 MG/1
4 TABLET, FILM COATED ORAL EVERY 8 HOURS PRN
Status: DISCONTINUED | OUTPATIENT
Start: 2025-07-13 | End: 2025-07-22 | Stop reason: HOSPADM

## 2025-07-13 RX ORDER — OXYCODONE HYDROCHLORIDE 5 MG/1
5 TABLET ORAL EVERY 4 HOURS PRN
Status: DISCONTINUED | OUTPATIENT
Start: 2025-07-13 | End: 2025-07-22 | Stop reason: HOSPADM

## 2025-07-13 RX ORDER — AMLODIPINE BESYLATE 10 MG/1
10 TABLET ORAL DAILY
Status: DISCONTINUED | OUTPATIENT
Start: 2025-07-13 | End: 2025-07-22 | Stop reason: HOSPADM

## 2025-07-13 RX ORDER — VITS A,C,E/LUTEIN/MINERALS 300MCG-200
1 TABLET ORAL DAILY
Status: DISCONTINUED | OUTPATIENT
Start: 2025-07-13 | End: 2025-07-22 | Stop reason: HOSPADM

## 2025-07-13 RX ORDER — ASPIRIN 81 MG/1
81 TABLET ORAL 2 TIMES DAILY
Status: DISCONTINUED | OUTPATIENT
Start: 2025-07-13 | End: 2025-07-18

## 2025-07-13 RX ORDER — METHOCARBAMOL 500 MG/1
500 TABLET, FILM COATED ORAL 4 TIMES DAILY PRN
Status: DISCONTINUED | OUTPATIENT
Start: 2025-07-13 | End: 2025-07-22 | Stop reason: HOSPADM

## 2025-07-13 RX ORDER — DULOXETIN HYDROCHLORIDE 20 MG/1
20 CAPSULE, DELAYED RELEASE ORAL DAILY
Status: DISCONTINUED | OUTPATIENT
Start: 2025-07-13 | End: 2025-07-22 | Stop reason: HOSPADM

## 2025-07-13 RX ORDER — DIPHENHYDRAMINE HCL 25 MG
25 TABLET ORAL EVERY 4 HOURS PRN
Status: DISCONTINUED | OUTPATIENT
Start: 2025-07-13 | End: 2025-07-22 | Stop reason: HOSPADM

## 2025-07-13 RX ORDER — FERROUS SULFATE 325(65) MG
325 TABLET ORAL EVERY OTHER DAY
Status: DISCONTINUED | OUTPATIENT
Start: 2025-07-13 | End: 2025-07-22 | Stop reason: HOSPADM

## 2025-07-13 RX ORDER — ACETAMINOPHEN 325 MG/1
650 TABLET ORAL EVERY 6 HOURS PRN
Status: DISCONTINUED | OUTPATIENT
Start: 2025-07-13 | End: 2025-07-22 | Stop reason: HOSPADM

## 2025-07-13 RX ORDER — SODIUM CHLORIDE 9 MG/ML
INJECTION, SOLUTION INTRAVENOUS PRN
Status: DISCONTINUED | OUTPATIENT
Start: 2025-07-13 | End: 2025-07-22 | Stop reason: HOSPADM

## 2025-07-13 RX ORDER — MAGNESIUM HYDROXIDE/ALUMINUM HYDROXICE/SIMETHICONE 120; 1200; 1200 MG/30ML; MG/30ML; MG/30ML
15 SUSPENSION ORAL
Status: DISCONTINUED | OUTPATIENT
Start: 2025-07-13 | End: 2025-07-22 | Stop reason: HOSPADM

## 2025-07-13 RX ORDER — MORPHINE SULFATE 4 MG/ML
4 INJECTION, SOLUTION INTRAMUSCULAR; INTRAVENOUS
Refills: 0 | Status: COMPLETED | OUTPATIENT
Start: 2025-07-13 | End: 2025-07-13

## 2025-07-13 RX ORDER — ONDANSETRON 2 MG/ML
4 INJECTION INTRAMUSCULAR; INTRAVENOUS ONCE
Status: COMPLETED | OUTPATIENT
Start: 2025-07-13 | End: 2025-07-13

## 2025-07-13 RX ORDER — 0.9 % SODIUM CHLORIDE 0.9 %
1000 INTRAVENOUS SOLUTION INTRAVENOUS ONCE
Status: COMPLETED | OUTPATIENT
Start: 2025-07-13 | End: 2025-07-13

## 2025-07-13 RX ORDER — GABAPENTIN 100 MG/1
200 CAPSULE ORAL 3 TIMES DAILY
COMMUNITY

## 2025-07-13 RX ORDER — LEVOTHYROXINE SODIUM 50 UG/1
50 TABLET ORAL DAILY
Status: DISCONTINUED | OUTPATIENT
Start: 2025-07-13 | End: 2025-07-22 | Stop reason: HOSPADM

## 2025-07-13 RX ORDER — GABAPENTIN 100 MG/1
200 CAPSULE ORAL 3 TIMES DAILY
Status: DISCONTINUED | OUTPATIENT
Start: 2025-07-13 | End: 2025-07-22 | Stop reason: HOSPADM

## 2025-07-13 RX ORDER — FAMOTIDINE 20 MG/1
20 TABLET, FILM COATED ORAL DAILY
Status: DISCONTINUED | OUTPATIENT
Start: 2025-07-13 | End: 2025-07-22 | Stop reason: HOSPADM

## 2025-07-13 RX ORDER — FOLIC ACID 1 MG/1
1 TABLET ORAL DAILY
Status: DISCONTINUED | OUTPATIENT
Start: 2025-07-13 | End: 2025-07-22 | Stop reason: HOSPADM

## 2025-07-13 RX ORDER — ACETAMINOPHEN 500 MG
1000 TABLET ORAL EVERY 8 HOURS
Status: ON HOLD | COMMUNITY
End: 2025-07-13

## 2025-07-13 RX ORDER — RIFAMPIN 300 MG/1
300 CAPSULE ORAL 2 TIMES DAILY
Status: COMPLETED | OUTPATIENT
Start: 2025-07-13 | End: 2025-07-15

## 2025-07-13 RX ADMIN — ONDANSETRON 4 MG: 2 INJECTION, SOLUTION INTRAMUSCULAR; INTRAVENOUS at 04:57

## 2025-07-13 RX ADMIN — ONDANSETRON HYDROCHLORIDE 4 MG: 4 TABLET, FILM COATED ORAL at 11:12

## 2025-07-13 RX ADMIN — FLUCONAZOLE 400 MG: 150 TABLET ORAL at 11:13

## 2025-07-13 RX ADMIN — SODIUM CHLORIDE 1000 ML: 9 INJECTION, SOLUTION INTRAVENOUS at 04:26

## 2025-07-13 RX ADMIN — I-VITE, TAB 1000-60-2MG (60/BT) 1 TABLET: TAB at 11:17

## 2025-07-13 RX ADMIN — OXYCODONE 5 MG: 5 TABLET ORAL at 11:12

## 2025-07-13 RX ADMIN — DULOXETINE 20 MG: 20 CAPSULE, DELAYED RELEASE ORAL at 12:44

## 2025-07-13 RX ADMIN — NAFCILLIN SODIUM 2000 MG: 2 INJECTION, POWDER, LYOPHILIZED, FOR SOLUTION INTRAMUSCULAR; INTRAVENOUS at 22:18

## 2025-07-13 RX ADMIN — OXYCODONE 5 MG: 5 TABLET ORAL at 23:13

## 2025-07-13 RX ADMIN — NAFCILLIN SODIUM 2000 MG: 2 INJECTION, POWDER, LYOPHILIZED, FOR SOLUTION INTRAMUSCULAR; INTRAVENOUS at 11:22

## 2025-07-13 RX ADMIN — FOLIC ACID 1 MG: 1 TABLET ORAL at 11:12

## 2025-07-13 RX ADMIN — SODIUM CHLORIDE 40 MG: 9 INJECTION, SOLUTION INTRAMUSCULAR; INTRAVENOUS; SUBCUTANEOUS at 11:11

## 2025-07-13 RX ADMIN — CALCIUM CARBONATE-VITAMIN D TAB 500 MG-200 UNIT 1 TABLET: 500-200 TAB at 12:44

## 2025-07-13 RX ADMIN — FAMOTIDINE 20 MG: 20 TABLET, FILM COATED ORAL at 12:44

## 2025-07-13 RX ADMIN — GABAPENTIN 200 MG: 100 CAPSULE ORAL at 11:13

## 2025-07-13 RX ADMIN — RIFAMPIN 300 MG: 300 CAPSULE ORAL at 20:51

## 2025-07-13 RX ADMIN — GABAPENTIN 200 MG: 100 CAPSULE ORAL at 20:49

## 2025-07-13 RX ADMIN — LEVOTHYROXINE SODIUM 50 MCG: 0.05 TABLET ORAL at 11:13

## 2025-07-13 RX ADMIN — FERROUS SULFATE TAB 325 MG (65 MG ELEMENTAL FE) 325 MG: 325 (65 FE) TAB at 11:12

## 2025-07-13 RX ADMIN — CALCIUM CARBONATE-VITAMIN D TAB 500 MG-200 UNIT 1 TABLET: 500-200 TAB at 20:49

## 2025-07-13 RX ADMIN — MORPHINE SULFATE 4 MG: 4 INJECTION, SOLUTION INTRAMUSCULAR; INTRAVENOUS at 04:57

## 2025-07-13 RX ADMIN — RIFAMPIN 300 MG: 300 CAPSULE ORAL at 11:17

## 2025-07-13 RX ADMIN — AMLODIPINE BESYLATE 10 MG: 10 TABLET ORAL at 11:13

## 2025-07-13 RX ADMIN — NAFCILLIN SODIUM 2000 MG: 2 INJECTION, POWDER, LYOPHILIZED, FOR SOLUTION INTRAMUSCULAR; INTRAVENOUS at 18:13

## 2025-07-13 ASSESSMENT — PAIN SCALES - GENERAL
PAINLEVEL_OUTOF10: 7
PAINLEVEL_OUTOF10: 8
PAINLEVEL_OUTOF10: 3
PAINLEVEL_OUTOF10: 8
PAINLEVEL_OUTOF10: 3

## 2025-07-13 ASSESSMENT — PAIN DESCRIPTION - ORIENTATION
ORIENTATION: LOWER
ORIENTATION: LOWER

## 2025-07-13 ASSESSMENT — PAIN DESCRIPTION - PAIN TYPE: TYPE: CHRONIC PAIN

## 2025-07-13 ASSESSMENT — PAIN DESCRIPTION - DESCRIPTORS
DESCRIPTORS: ACHING
DESCRIPTORS: ACHING;DISCOMFORT

## 2025-07-13 ASSESSMENT — PAIN - FUNCTIONAL ASSESSMENT
PAIN_FUNCTIONAL_ASSESSMENT: 0-10
PAIN_FUNCTIONAL_ASSESSMENT: ACTIVITIES ARE NOT PREVENTED
PAIN_FUNCTIONAL_ASSESSMENT: ACTIVITIES ARE NOT PREVENTED

## 2025-07-13 ASSESSMENT — PAIN DESCRIPTION - LOCATION
LOCATION: ABDOMEN;BACK
LOCATION: ABDOMEN
LOCATION: BACK
LOCATION: BACK

## 2025-07-13 NOTE — H&P
History & Physical      PCP: Kelly Shaw MD    Date of Admission: 7/13/2025    Date of Service: Pt seen/examined on 7/13/2025 and is     admitted to Inpatient with expected LOS greater than two midnights due to medical therapy.          Chief Complaint:  had concerns including GI Problem (Per [facility] pt has had blood in her stools, emesis and nose bleeds since starting Heparin drip on 7/10 (due to blood clot in the right arm where previous midline was placed). Pt on IV ATB for right hip infection.  ).    History Of Present Illness:    Ms. Clau De La Vega, a 74 y.o. year old female  who  has a past medical history of Acid reflux, Analgesic rebound headache, Chronic anemia, Chronic back pain, Constipation, COPD (chronic obstructive pulmonary disease) ([facility]), GERD (gastroesophageal reflux disease), Headache(784.0), History of removal of joint prosthesis of right hip due to infection, History of sinus problem, Hyperlipidemia, Hypertension, Memory loss, short term, MSSA (methicillin susceptible Staphylococcus aureus) infection, Neuropathy, Osteoarthritis, Pain in neck, Peptic stricture of esophagus, Rheumatoid arthritis(714.0), Ringing in ears, Stage 3 chronic kidney disease ([facility]), and TIA (transient ischemic attack).       This is a patient of 74 years of age she has a previous history of hyperlipidemia and osteoarthritis and also was on methotrexate for rheumatoid arthritis.  She had a total right hip arthroplasty in 2023 and had consistent issues postoperatively.  She eventually could not walk and came back to her surgeon for further care she was admitted to [place] and diagnosed with bacteremia of methicillin sensitive Staph aureus.  She did have aspiration on May 27 and fluid was expressed and described as purulent in the cell count was not possible with the specimen however because of her positive blood cultures she had a workup with infectious disease.  There was erosive changes on imaging

## 2025-07-13 NOTE — CONSENT
Informed Consent for Blood Component Transfusion Note    I have discussed with the patient the rationale for blood component transfusion; its benefits in treating or preventing fatigue, organ damage, or death; and its risk which includes mild transfusion reactions, rare risk of blood borne infection, or more serious but rare reactions. I have discussed the alternatives to transfusion, including the risk and consequences of not receiving transfusion. The patient had an opportunity to ask questions and had agreed to proceed with transfusion of blood components.    Electronically signed by Brian Garcia MD on 7/13/25 at 4:58 AM EDT

## 2025-07-13 NOTE — ED PROVIDER NOTES
[facility] EMERGENCY DEPARTMENT  EMERGENCY DEPARTMENT ENCOUNTER      Pt Name: Clau De La Vega  MRN: [id number]  Birthdate 1950  Date of evaluation: 7/13/2025  Provider: Brian Garcia MD     CHIEF COMPLAINT       Chief Complaint   Patient presents with    GI Problem     Per [facility] pt has had blood in her stools, emesis and nose bleeds since starting Heparin drip on 7/10 (due to blood clot in the right arm where previous midline was placed). Pt on IV ATB for right hip infection.           HISTORY OF PRESENT ILLNESS   (Location/Symptom, Timing/Onset, Context/Setting, Quality, Duration, Modifying Factors, Severity) Note limiting factors.        HPI    Clau De La Vega is a 74 y.o. female who presents to the emergency department with concern for GI bleed no GI bleeding for the last 4 days is on a blood thinner for DVT.  Blood counts went down to 6 and she is tachycardic at 108 sent over from facility for further eval.    Nursing Notes were reviewed.    REVIEW OF SYSTEMS    (2+ for level 4; 10+ for level 5)   Review of Systems    PAST MEDICAL HISTORY     Past Medical History:   Diagnosis Date    Acid reflux     Analgesic rebound headache 01/08/2019    Chronic anemia     Chronic back pain     Constipation     COPD (chronic obstructive pulmonary disease) (HCC)     GERD (gastroesophageal reflux disease)     Headache(784.0)     History of removal of joint prosthesis of right hip due to infection     History of sinus problem     Hyperlipidemia     Hypertension     Memory loss, short term     MSSA (methicillin susceptible Staphylococcus aureus) infection     Neuropathy     Osteoarthritis     Pain in neck     Peptic stricture of esophagus     Rheumatoid arthritis(714.0)     Dr. Dorene Wheeler in ears     Stage 3 chronic kidney disease (HCC)     TIA (transient ischemic attack)        SURGICAL HISTORY       Past Surgical History:   Procedure Laterality Date    CARDIOVASCULAR STRESS TEST   108 22 --      Height Weight - Scale         1.473 m (4' 10\") 54.4 kg (120 lb)             Physical Exam  Constitutional:       Appearance: She is well-developed.   HENT:      Head: Normocephalic.   Cardiovascular:      Rate and Rhythm: Tachycardia present.   Pulmonary:      Effort: Pulmonary effort is normal.   Abdominal:      General: There is no distension.      Tenderness: There is no abdominal tenderness. There is no guarding.   Neurological:      Mental Status: She is alert.         DIAGNOSTIC RESULTS     EKG (Per Emergency Physician):       RADIOLOGY (Per Emergency Physician):       Interpretation per the Radiologist below, if available at the time of this note:  No results found.    ED BEDSIDE ULTRASOUND:   Performed by ED Physician - none    LABS:  Labs Reviewed   CBC WITH AUTO DIFFERENTIAL - Abnormal; Notable for the following components:       Result Value    WBC 13.1 (*)     RBC 2.01 (*)     Hemoglobin 5.4 (*)     Hematocrit 16.9 (*)     RDW 19.9 (*)     Lymphocytes % 12 (*)     Neutrophils Absolute 9.81 (*)     Monocytes Absolute 1.36 (*)     All other components within normal limits   BASIC METABOLIC PANEL - Abnormal; Notable for the following components:    Sodium 135 (*)     CO2 19 (*)     Glucose 127 (*)     BUN 27 (*)     Creatinine 1.7 (*)     Est, Glom Filt Rate 32 (*)     Calcium 8.1 (*)     All other components within normal limits   TYPE AND SCREEN   PREPARE RBC (CROSSMATCH)        All other labs were within normal range or not returned as of this dictation.    EMERGENCY DEPARTMENT COURSE and DIFFERENTIAL DIAGNOSIS/MDM:   Vitals:    Vitals:    07/13/25 0401   BP: 135/69   Pulse: (!) 108   Resp: 22   Temp: 97.7 °F (36.5 °C)   TempSrc: Oral   SpO2: 100%   Weight: 54.4 kg (120 lb)   Height: 1.473 m (4' 10\")       Medications   sodium chloride 0.9 % bolus 1,000 mL (1,000 mLs IntraVENous New Bag 7/13/25 0426)   0.9 % sodium chloride infusion (has no administration in time range)   morphine sulfate

## 2025-07-13 NOTE — PROGRESS NOTES
4 Eyes Skin Assessment     NAME:  Clau De La Vega  YOB: 1950  MEDICAL RECORD NUMBER:  [id number]    The patient is being assessed for  Admission    I agree that at least one RN has performed a thorough Head to Toe Skin Assessment on the patient. ALL assessment sites listed below have been assessed.      Areas assessed by both nurses:    Head, Face, Ears, Shoulders, Back, Chest, Arms, Elbows, Hands, Sacrum. Buttock, Coccyx, Ischium, Legs. Feet and Heels, and Under Medical Devices         Does the Patient have a Wound? Yes wound(s) were present on assessment. LDA wound assessment was Initiated and completed by RN       Meng Prevention initiated by RN: Yes  Wound Care Orders initiated by RN: Yes    Pressure Injury (Stage 1,2,3,4, Unstageable, DTI, NWPT, and Complex wounds) if present, place Wound referral order by RN under : Yes    New Ostomies, if present place, Ostomy referral order under : No     Nurse 1 eSignature: Electronically signed by Cass Smith RN on 7/13/25 at 4:29 PM EDT    **SHARE this note so that the co-signing nurse can place an eSignature**    Nurse 2 eSignature: Electronically signed by Kaylin Melo RN on 7/13/25 at 4:51 PM EDT

## 2025-07-13 NOTE — CONSULTS
Infectious Disease Consult Note     Admit Date: 7/13/2025  4:00 AM    Chief complaint: Anemia    Reason for Consult: Right hip septic arthritis    Requesting Physician:  Alexa Bourgeois MD      HISTORY OF PRESENT ILLNESS:    This is a patient of 74 years of age she has a previous history of hyperlipidemia and osteoarthritis and also was on methotrexate for rheumatoid arthritis.  She had a total right hip arthroplasty in 2023 and had consistent issues postoperatively.  She eventually could not walk and came back to her surgeon for further care she was admitted to [facility] and diagnosed with bacteremia of methicillin sensitive Staph aureus.  She did have aspiration on May 27 and fluid was expressed and described as purulent in the cell count was not possible with the specimen however because of her positive blood cultures she had a workup with infectious disease.  There was erosive changes on imaging of the right hip of the femoral implant at the fracture of the lesser trochanter with lucency around the bone.  She was admitted and was given Teflaro from 6/4/2025 till 6/6/2025 and then went back on oxacillin.  On May 31, 2025 she had an explant with articulating antibiotic and she had operative cultures at that point that were positive for Staph aureus and eventually repeat scanning showed fluid accumulation with persistent Staph aureus.  On 6 June she went back to the OR for right hip revision washout and VAC placement and IntraOp cultures were positive for MSSA.  On 11 June she had undergone right hip I&D revision with head exchange and IntraOp cultures were obtained and this was now positive for Candida.  She was added on fluconazole along with oxacillin.  She was at that point allowed toe-touch of the right foot with posterior hip precautions and placed on aspirin as well for 4 weeks.  During her stay at [facility] she received a transesophageal echo which was negative for vegetations and she was kept on her  Verbal Abuse: Denies     [facility] - Reported To: Not on file   Housing Stability: Low Risk  (7/13/2025)    Housing Stability Vital Sign     Unable to Pay for Housing in the Last Year: No     Number of Times Moved in the Last Year: 0     Homeless in the Last Year: No         Current Medications:     Scheduled Meds:   amLODIPine  10 mg Oral Daily    [Held by provider] aspirin  81 mg Oral BID    oyster shell calcium w/D  1 tablet Oral BID    DULoxetine  20 mg Oral Daily    famotidine  20 mg Oral Daily    ferrous sulfate  325 mg Oral Every Other Day    fluconazole  400 mg Oral Daily    folic acid  1 mg Oral Daily    gabapentin  200 mg Oral TID    levothyroxine  50 mcg Oral Daily    therapeutic multivitamin-minerals  1 tablet Oral Daily    rifAMPin  300 mg Oral BID    nafcillin  2,000 mg IntraVENous Q4H    pantoprazole (PROTONIX) 40 mg in sodium chloride (PF) 0.9 % 10 mL injection  40 mg IntraVENous Daily     Continuous Infusions:   sodium chloride      sodium chloride       PRN Meds:sodium chloride, acetaminophen, aluminum & magnesium hydroxide-simethicone, diphenhydrAMINE, melatonin, methocarbamol, ondansetron, oxyCODONE, sodium chloride      PHYSICAL EXAM:  Vitals:    07/13/25 1242 07/13/25 1257 07/13/25 1454 07/13/25 1530   BP: 136/63 130/60 130/63 (!) 145/65   Pulse: (!) 108 (!) 104 94 90   Resp: 16 16 16    Temp: 98.2 °F (36.8 °C) 98.2 °F (36.8 °C) 98.1 °F (36.7 °C) 98.4 °F (36.9 °C)   TempSrc:  Oral Oral Oral   SpO2: 100% 99% 98% 100%   Weight:       Height:           General Appearance:       Alert, cooperative, no distress, appears stated age        Heent:    Normocephalic, atraumatic,     PERRL, conjunctiva/corneas clear     no drainage or sinus tenderness      Neck:   Supple, symmetrical, trachea midline   Back:     Symmetric, no CVA tenderness   Lungs:     Clear to auscultation bilaterally, respirations unlabored   Chest Wall:    No tenderness or deformity    Heart:    Regular rate and rhythm, S1

## 2025-07-13 NOTE — ED NOTES
Pt is requesting pain medication at this time. Pt is NPO at this time. Reached out to provider fr orders at this time

## 2025-07-13 NOTE — CONSULTS
General Surgery   Consult Note      Patient's Name/Date of Birth: Clau De L aVega / 1950    Date: July 13, 2025     PCP: Kelly Shaw MD     Chief Complaint:   Chief Complaint   Patient presents with    GI Problem     Per [facility] pt has had blood in her stools, emesis and nose bleeds since starting Heparin drip on 7/10 (due to blood clot in the right arm where previous midline was placed). Pt on IV ATB for right hip infection.       HPI:   Clau De La Vega is a 74 y.o. female who presents for evaluation of hematochezia that started yesterday. Denies abdominal pain, nausea, emesis. Pt has a Hx of right hip arthroplasty 2023 s/p explant for septic arthritis and osteomyelitis in May  2025 with subsequent I&D of the right hip. Pt had a PICC line placed for Abx and developed RUE DVT for which she was started on an anticoagulant for. Per chart review it was noted that she experienced some epistaxies and bloody stool. General Surgery was consulted for concerns of GIB. Denies similar sx prior to being started on heparin drip. Denies any similar sx in the past.     PMHx of TIA, CKD, RA, GERD, COPD, constipation, chronic anemia. Shx includes hysterectomy, tubal ligation, bowel perforation s/p Ex-lap. Last colonoscopy more than 10 years ago. Last EGD in 2023 by Dr. Ruiz showed sathish esophagitis, mild esophageal peptic stricture s/p dilation and gastritis.     Pt is afebrile, intermittently tachycardic 103-110 and hypertensive. Labs reviewed with Na 135, Cr 1.7, BUN 27, WBC 13.1, Hgb 5.4 s/p 2 uPRBC with post transfusion Hgb 9.2 (baseline around 8), Plts 439. CTAP with no active bleeding notes, no gastric or duodenal wall thickening, no pneumoperitoneum and a lateral right thigh fluid collection measuring 5 x 2.8 x 11.9 cm.       Patient Active Problem List   Diagnosis    GERD (gastroesophageal reflux disease)    Cervical radiculopathy, chronic    Hyperlipidemia LDL goal <100    Essential hypertension

## 2025-07-13 NOTE — ED NOTES
ED to Inpatient Handoff Report    Notified 6s that electronic handoff available and patient ready for transport to room 641.    Safety Risks: None identified    Patient in Restraints: no    Constant Observer or Patient : no    Telemetry Monitoring Ordered: Yes          Order to transfer to unit without monitor: NO    Last MEWS: 1 Time completed: 1454    Deterioration Index: 25.91    Vitals:    07/13/25 0901 07/13/25 1242 07/13/25 1257 07/13/25 1454   BP: (!) 142/67 136/63 130/60 130/63   Pulse: 94 (!) 108 (!) 104 94   Resp: 10 16 16 16   Temp:  98.2 °F (36.8 °C) 98.2 °F (36.8 °C) 98.1 °F (36.7 °C)   TempSrc:   Oral Oral   SpO2:  100% 99% 98%   Weight:       Height:           Opportunity for questions and clarification was provided.

## 2025-07-14 ENCOUNTER — APPOINTMENT (OUTPATIENT)
Dept: GENERAL RADIOLOGY | Age: 75
DRG: 254 | End: 2025-07-14
Payer: COMMERCIAL

## 2025-07-14 ENCOUNTER — ANESTHESIA EVENT (OUTPATIENT)
Dept: ENDOSCOPY | Age: 75
End: 2025-07-14
Payer: COMMERCIAL

## 2025-07-14 PROBLEM — K92.1 MELENA: Status: ACTIVE | Noted: 2025-07-14

## 2025-07-14 LAB
ABO/RH: NORMAL
ALBUMIN SERPL-MCNC: 2.5 G/DL (ref 3.5–5.2)
ALP SERPL-CCNC: 112 U/L (ref 35–104)
ALT SERPL-CCNC: 11 U/L (ref 0–35)
ANION GAP SERPL CALCULATED.3IONS-SCNC: 15 MMOL/L (ref 7–16)
ANTIBODY SCREEN: NEGATIVE
ARM BAND NUMBER: NORMAL
AST SERPL-CCNC: 25 U/L (ref 0–35)
BILIRUB SERPL-MCNC: 0.6 MG/DL (ref 0–1.2)
BLOOD BANK BLOOD PRODUCT EXPIRATION DATE: NORMAL
BLOOD BANK BLOOD PRODUCT EXPIRATION DATE: NORMAL
BLOOD BANK DISPENSE STATUS: NORMAL
BLOOD BANK DISPENSE STATUS: NORMAL
BLOOD BANK ISBT PRODUCT BLOOD TYPE: 6200
BLOOD BANK ISBT PRODUCT BLOOD TYPE: 6200
BLOOD BANK PRODUCT CODE: NORMAL
BLOOD BANK PRODUCT CODE: NORMAL
BLOOD BANK SAMPLE EXPIRATION: NORMAL
BLOOD BANK UNIT TYPE AND RH: NORMAL
BLOOD BANK UNIT TYPE AND RH: NORMAL
BPU ID: NORMAL
BPU ID: NORMAL
BUN SERPL-MCNC: 21 MG/DL (ref 8–23)
CALCIUM SERPL-MCNC: 8.1 MG/DL (ref 8.8–10.2)
CHLORIDE SERPL-SCNC: 107 MMOL/L (ref 98–107)
CO2 SERPL-SCNC: 18 MMOL/L (ref 22–29)
COMPONENT: NORMAL
COMPONENT: NORMAL
CREAT SERPL-MCNC: 1.3 MG/DL (ref 0.5–1)
CROSSMATCH RESULT: NORMAL
CROSSMATCH RESULT: NORMAL
ERYTHROCYTE [DISTWIDTH] IN BLOOD BY AUTOMATED COUNT: 17.6 % (ref 11.5–15)
GFR, ESTIMATED: 43 ML/MIN/1.73M2
GLUCOSE SERPL-MCNC: 57 MG/DL (ref 74–99)
HCT VFR BLD AUTO: 26.4 % (ref 34–48)
HCT VFR BLD AUTO: 26.8 % (ref 34–48)
HCT VFR BLD AUTO: 27.3 % (ref 34–48)
HGB BLD-MCNC: 8.8 G/DL (ref 11.5–15.5)
HGB BLD-MCNC: 9.2 G/DL (ref 11.5–15.5)
HGB BLD-MCNC: 9.5 G/DL (ref 11.5–15.5)
MCH RBC QN AUTO: 28.8 PG (ref 26–35)
MCHC RBC AUTO-ENTMCNC: 34.3 G/DL (ref 32–34.5)
MCV RBC AUTO: 83.8 FL (ref 80–99.9)
PLATELET # BLD AUTO: 354 K/UL (ref 130–450)
PMV BLD AUTO: 8.9 FL (ref 7–12)
POTASSIUM SERPL-SCNC: 4.1 MMOL/L (ref 3.5–5.1)
PROT SERPL-MCNC: 6.2 G/DL (ref 6.4–8.3)
RBC # BLD AUTO: 3.2 M/UL (ref 3.5–5.5)
SODIUM SERPL-SCNC: 140 MMOL/L (ref 136–145)
TRANSFUSION STATUS: NORMAL
TRANSFUSION STATUS: NORMAL
UNIT DIVISION: 0
UNIT DIVISION: 0
UNIT ISSUE DATE/TIME: NORMAL
UNIT ISSUE DATE/TIME: NORMAL
WBC OTHER # BLD: 12.9 K/UL (ref 4.5–11.5)

## 2025-07-14 PROCEDURE — 85018 HEMOGLOBIN: CPT

## 2025-07-14 PROCEDURE — 6370000000 HC RX 637 (ALT 250 FOR IP)

## 2025-07-14 PROCEDURE — 6370000000 HC RX 637 (ALT 250 FOR IP): Performed by: REGISTERED NURSE

## 2025-07-14 PROCEDURE — 99222 1ST HOSP IP/OBS MODERATE 55: CPT | Performed by: SURGERY

## 2025-07-14 PROCEDURE — 05H833Z INSERTION OF INFUSION DEVICE INTO LEFT AXILLARY VEIN, PERCUTANEOUS APPROACH: ICD-10-PCS | Performed by: INTERNAL MEDICINE

## 2025-07-14 PROCEDURE — 6360000002 HC RX W HCPCS: Performed by: REGISTERED NURSE

## 2025-07-14 PROCEDURE — 85014 HEMATOCRIT: CPT

## 2025-07-14 PROCEDURE — 6370000000 HC RX 637 (ALT 250 FOR IP): Performed by: INTERNAL MEDICINE

## 2025-07-14 PROCEDURE — 85027 COMPLETE CBC AUTOMATED: CPT

## 2025-07-14 PROCEDURE — 2580000003 HC RX 258: Performed by: REGISTERED NURSE

## 2025-07-14 PROCEDURE — 2060000000 HC ICU INTERMEDIATE R&B

## 2025-07-14 PROCEDURE — 6360000002 HC RX W HCPCS: Performed by: INTERNAL MEDICINE

## 2025-07-14 PROCEDURE — 71045 X-RAY EXAM CHEST 1 VIEW: CPT

## 2025-07-14 PROCEDURE — 80053 COMPREHEN METABOLIC PANEL: CPT

## 2025-07-14 PROCEDURE — 2580000003 HC RX 258: Performed by: INTERNAL MEDICINE

## 2025-07-14 RX ORDER — SUCRALFATE 1 G/1
1 TABLET ORAL
Status: DISCONTINUED | OUTPATIENT
Start: 2025-07-14 | End: 2025-07-22 | Stop reason: HOSPADM

## 2025-07-14 RX ORDER — POLYETHYLENE GLYCOL 3350 17 G/17G
238 POWDER ORAL ONCE
Status: COMPLETED | OUTPATIENT
Start: 2025-07-14 | End: 2025-07-14

## 2025-07-14 RX ORDER — SODIUM CHLORIDE, SODIUM LACTATE, POTASSIUM CHLORIDE, CALCIUM CHLORIDE 600; 310; 30; 20 MG/100ML; MG/100ML; MG/100ML; MG/100ML
INJECTION, SOLUTION INTRAVENOUS CONTINUOUS
Status: DISCONTINUED | OUTPATIENT
Start: 2025-07-15 | End: 2025-07-17

## 2025-07-14 RX ORDER — PANTOPRAZOLE SODIUM 40 MG/1
40 TABLET, DELAYED RELEASE ORAL
Status: DISCONTINUED | OUTPATIENT
Start: 2025-07-14 | End: 2025-07-22 | Stop reason: HOSPADM

## 2025-07-14 RX ADMIN — NAFCILLIN SODIUM 2000 MG: 2 INJECTION, POWDER, LYOPHILIZED, FOR SOLUTION INTRAMUSCULAR; INTRAVENOUS at 03:21

## 2025-07-14 RX ADMIN — CALCIUM CARBONATE-VITAMIN D TAB 500 MG-200 UNIT 1 TABLET: 500-200 TAB at 08:26

## 2025-07-14 RX ADMIN — RIFAMPIN 300 MG: 300 CAPSULE ORAL at 20:24

## 2025-07-14 RX ADMIN — SUCRALFATE 1 G: 1 TABLET ORAL at 20:16

## 2025-07-14 RX ADMIN — GABAPENTIN 200 MG: 100 CAPSULE ORAL at 20:16

## 2025-07-14 RX ADMIN — DULOXETINE 20 MG: 20 CAPSULE, DELAYED RELEASE ORAL at 08:27

## 2025-07-14 RX ADMIN — FOLIC ACID 1 MG: 1 TABLET ORAL at 08:26

## 2025-07-14 RX ADMIN — PANTOPRAZOLE SODIUM 40 MG: 40 TABLET, DELAYED RELEASE ORAL at 16:49

## 2025-07-14 RX ADMIN — NAFCILLIN SODIUM 2000 MG: 2 INJECTION, POWDER, LYOPHILIZED, FOR SOLUTION INTRAMUSCULAR; INTRAVENOUS at 11:08

## 2025-07-14 RX ADMIN — LEVOTHYROXINE SODIUM 50 MCG: 0.05 TABLET ORAL at 06:24

## 2025-07-14 RX ADMIN — POLYETHYLENE GLYCOL 3350 238 G: 17 POWDER, FOR SOLUTION ORAL at 16:49

## 2025-07-14 RX ADMIN — OXYCODONE 5 MG: 5 TABLET ORAL at 06:24

## 2025-07-14 RX ADMIN — FLUCONAZOLE 400 MG: 150 TABLET ORAL at 08:27

## 2025-07-14 RX ADMIN — POLYETHYLENE GLYCOL-3350 AND ELECTROLYTES 4000 ML: 236; 6.74; 5.86; 2.97; 22.74 POWDER, FOR SOLUTION ORAL at 11:02

## 2025-07-14 RX ADMIN — SUCRALFATE 1 G: 1 TABLET ORAL at 16:49

## 2025-07-14 RX ADMIN — PANTOPRAZOLE SODIUM 40 MG: 40 TABLET, DELAYED RELEASE ORAL at 08:26

## 2025-07-14 RX ADMIN — NAFCILLIN SODIUM 2000 MG: 2 INJECTION, POWDER, LYOPHILIZED, FOR SOLUTION INTRAMUSCULAR; INTRAVENOUS at 06:30

## 2025-07-14 RX ADMIN — CALCIUM CARBONATE-VITAMIN D TAB 500 MG-200 UNIT 1 TABLET: 500-200 TAB at 20:16

## 2025-07-14 RX ADMIN — FAMOTIDINE 20 MG: 20 TABLET, FILM COATED ORAL at 20:16

## 2025-07-14 RX ADMIN — I-VITE, TAB 1000-60-2MG (60/BT) 1 TABLET: TAB at 08:25

## 2025-07-14 RX ADMIN — SUCRALFATE 1 G: 1 TABLET ORAL at 11:02

## 2025-07-14 RX ADMIN — NAFCILLIN SODIUM 2000 MG: 2 INJECTION, POWDER, LYOPHILIZED, FOR SOLUTION INTRAMUSCULAR; INTRAVENOUS at 20:23

## 2025-07-14 RX ADMIN — RIFAMPIN 300 MG: 300 CAPSULE ORAL at 08:26

## 2025-07-14 RX ADMIN — NAFCILLIN SODIUM 2000 MG: 2 INJECTION, POWDER, LYOPHILIZED, FOR SOLUTION INTRAMUSCULAR; INTRAVENOUS at 16:57

## 2025-07-14 RX ADMIN — AMLODIPINE BESYLATE 10 MG: 10 TABLET ORAL at 08:26

## 2025-07-14 RX ADMIN — GABAPENTIN 200 MG: 100 CAPSULE ORAL at 14:02

## 2025-07-14 RX ADMIN — GABAPENTIN 200 MG: 100 CAPSULE ORAL at 08:25

## 2025-07-14 ASSESSMENT — PAIN DESCRIPTION - LOCATION: LOCATION: BACK

## 2025-07-14 ASSESSMENT — PAIN SCALES - GENERAL: PAINLEVEL_OUTOF10: 8

## 2025-07-14 ASSESSMENT — PAIN - FUNCTIONAL ASSESSMENT: PAIN_FUNCTIONAL_ASSESSMENT: ACTIVITIES ARE NOT PREVENTED

## 2025-07-14 ASSESSMENT — PAIN DESCRIPTION - ORIENTATION: ORIENTATION: RIGHT;LEFT;MID

## 2025-07-14 ASSESSMENT — PAIN DESCRIPTION - DESCRIPTORS: DESCRIPTORS: ACHING;DISCOMFORT

## 2025-07-14 NOTE — PLAN OF CARE
Problem: Pain  Goal: Verbalizes/displays adequate comfort level or baseline comfort level  7/13/2025 2302 by Kylee Tirado RN  Outcome: Progressing  7/13/2025 1841 by Cass Smith RN  Outcome: Progressing     Problem: Skin/Tissue Integrity  Goal: Skin integrity remains intact  Description: 1.  Monitor for areas of redness and/or skin breakdown  2.  Assess vascular access sites hourly  3.  Every 4-6 hours minimum:  Change oxygen saturation probe site  4.  Every 4-6 hours:  If on nasal continuous positive airway pressure, respiratory therapy assess nares and determine need for appliance change or resting period  7/13/2025 2302 by Kylee Tirado RN  Outcome: Progressing  7/13/2025 1841 by Cass Smith RN  Outcome: Progressing     Problem: Safety - Adult  Goal: Free from fall injury  7/13/2025 2302 by Kylee Tirado RN  Outcome: Progressing  7/13/2025 1841 by Cass Smith RN  Outcome: Progressing     Problem: ABCDS Injury Assessment  Goal: Absence of physical injury  7/13/2025 2302 by Kylee Tirado RN  Outcome: Progressing  7/13/2025 1841 by Cass Smith RN  Outcome: Progressing

## 2025-07-14 NOTE — ANESTHESIA PRE PROCEDURE
Department of Anesthesiology  Preprocedure Note       Name:  Clau De La Vega   Age:  74 y.o.  :  1950                                          MRN:  [id number]         Date:  2025      Surgeon: Surgeon(s):  Rufina Epps MD    Procedure: Procedure(s):  ESOPHAGOGASTRODUODENOSCOPY  COLONOSCOPY DIAGNOSTIC         ++LATEX ALLERGY++    Medications prior to admission:   Prior to Admission medications    Medication Sig Start Date End Date Taking? Authorizing Provider   gabapentin (NEURONTIN) 100 MG capsule Take 2 capsules by mouth 3 times daily.   Yes Dayne Shafer MD   diclofenac sodium (VOLTAREN) 1 % GEL Apply 2 g topically 2 times daily   Yes Dayne Shafer MD   vitamin C (ASCORBIC ACID) 500 MG tablet Take 1 tablet by mouth 2 times daily   Yes Dayne Shafer MD   aspirin 81 MG EC tablet Take 1 tablet by mouth in the morning and at bedtime   Yes Dayne Shafer MD   calcium-vitamin D (OSCAL-500) 500-5 MG-MCG TABS per tablet Take 1 tablet by mouth 2 times daily   Yes Dayne Shafer MD   DULoxetine (CYMBALTA) 20 MG extended release capsule Take 1 capsule by mouth daily   Yes ProviderDayne MD   famotidine (PEPCID) 20 MG tablet Take 1 tablet by mouth daily   Yes ProviderDayne MD   fluconazole (DIFLUCAN) 200 MG tablet Take 2 tablets by mouth daily   Yes ProviderDayne MD   levothyroxine (SYNTHROID) 50 MCG tablet Take 1 tablet by mouth Daily   Yes ProviderDayne MD   Oxacillin Sodium 10 g SOLR Infuse 2 g intravenously every 4 hours   Yes ProviderDayne MD   rifAMPin (RIFADIN) 300 MG capsule Take 1 capsule by mouth 2 times daily   Yes Dayne Shafer MD   acetaminophen (TYLENOL) 325 MG tablet Take 2 tablets by mouth every 6 hours as needed for Pain   Yes ProviderDayne MD   melatonin 3 MG TABS tablet Take 2 tablets by mouth nightly as needed   Yes Dayne Shafer MD   methocarbamol (ROBAXIN) 500 MG tablet Take 1 tablet

## 2025-07-14 NOTE — ACP (ADVANCE CARE PLANNING)
Advance Care Planning   Healthcare Decision Maker:    Primary Decision Maker: Skinny De La Vega - Child - [phone]    Primary Decision Maker: Steve De La Vega - Child - [phone]    Click here to complete Healthcare Decision Makers including selection of the Healthcare Decision Maker Relationship (ie \"Primary\").  Today we documented Decision Maker(s) consistent with Legal Next of Kin hierarchy.

## 2025-07-14 NOTE — PROGRESS NOTES
[facility]  [facility]  Progress Note    SUBJECTIVE:  Chief Complaint   Patient presents with    GI Problem     Per [facility] pt has had blood in her stools, emesis and nose bleeds since starting Heparin drip on 7/10 (due to blood clot in the right arm where previous midline was placed). Pt on IV ATB for right hip infection.       Patient is tolerating medications. No reported adverse drug reactions.  No nausea, vomiting, diarrhea.  No fever  Resting in bed comfortably      Review of systems:  As stated above in the chief complaint, otherwise negative.    Medications:  Scheduled Meds:   pantoprazole  40 mg Oral BID AC    sucralfate  1 g Oral 4x Daily AC & HS    amLODIPine  10 mg Oral Daily    [Held by provider] aspirin  81 mg Oral BID    oyster shell calcium w/D  1 tablet Oral BID    DULoxetine  20 mg Oral Daily    famotidine  20 mg Oral Daily    ferrous sulfate  325 mg Oral Every Other Day    fluconazole  400 mg Oral Daily    folic acid  1 mg Oral Daily    gabapentin  200 mg Oral TID    levothyroxine  50 mcg Oral Daily    ocuvite-lutein  1 tablet Oral Daily    rifAMPin  300 mg Oral BID    nafcillin  2,000 mg IntraVENous Q4H     Continuous Infusions:   [START ON 7/15/2025] lactated ringers      sodium chloride      sodium chloride       PRN Meds:sodium chloride, acetaminophen, aluminum & magnesium hydroxide-simethicone, diphenhydrAMINE, melatonin, methocarbamol, ondansetron, oxyCODONE, sodium chloride    OBJECTIVE:  BP (!) 125/56   Pulse 91   Temp 99 °F (37.2 °C) (Oral)   Resp 18   Ht 1.473 m (4' 10\")   Wt 54.4 kg (120 lb)   SpO2 100%   BMI 25.08 kg/m²   Temp  Av.3 °F (36.8 °C)  Min: 97.6 °F (36.4 °C)  Max: 99 °F (37.2 °C)  Constitutional: The patient is awake, alert, and oriented.   Skin: Warm and dry. No rashes were noted.   HEENT: Round and reactive pupils.  Moist mucous membranes.  No ulcerations or thrush.  Neck: Supple to movements.   Chest: No use of accessory

## 2025-07-14 NOTE — CARE COORDINATION
Social Work / Discharge Planning :Patient was admitted from [facility] with GIB. Careport initiated to confirm bed hold and pre-cert status. Await response. SW to follow. Electronically signed by NATASHA Mullen on 7/14/25 at 10:31 AM EDT     Addendum:[facility] declined due to patient was set to go to [facility] on the 16th. Will verify plans with patient/ family. SW to follow. Electronically signed by NATASHA Mullen on 7/14/25 at 11:08 AM EDT     Addendum: SW met with patient and explained role as discharge planner/ transition of care. Patient verified plan is [facility]. [facility] did accept. Will need pre-cert when medically stable. N 17, HENS and transport forms completed. SW to follow. Electronically signed by NATASHA Mullen on 7/14/25 at 12:26 PM EDT

## 2025-07-14 NOTE — CONSULTS
Vascular Surgery Inpatient Consultation Note      Reason for Consultation: Upper extremity DVT in the setting of GI bleed    HISTORY OF PRESENT ILLNESS:                The patient is a 74 y.o. female who is admitted to the hospital for treatment of bleeding diathesis.  She has a right upper extremity axillary DVT which was treated with Eliquis, however she had bleeding diathesis related to this, she was admitted to hospital for this.  Vascular surgery is consulted for evaluation and treatment.  Vascular surgery was initially consulted because of question of what to do in the setting of inability to anticoagulate for right upper extremity DVT, I advised that there was no procedure to be done and to anticoagulate when able.  She thereafter underwent endoscopy to rule out GI bleed, this was negative, vascular surgery is now being asked for anticoagulation recommendations.  On patient interview, she says that she was on Eliquis previously and she had bleeding diathesis related to full dose Eliquis.  She does not want to be on full dose Eliquis.  Upper extremity is not terribly bothersome to her.    IMPRESSION: I reviewed right upper extremity venous duplex, she has acute DVT of right axillary and 1 of paired brachial veins.  Upper extremity DVT is usually treated with 3 months of anticoagulation.  However, she had issues with full dose anticoagulation, specifically multiple bleeding diathesis.  I think it is reasonable to do an abbreviated dose of 2.5 twice daily versus 5 twice daily of Eliquis.    RECOMMENDATIONS: No role for intervention for upper extremity DVT.  No role for vascular surgery in choosing modality of anticoagulation, will leave to primary discretion.  From my standpoint, I think it is reasonable to do 2.5 twice daily dosing of Eliquis given her bleeding diathesis related to 5 twice daily Eliquis.  She should undergo repeat duplex in 3 months to evaluate resolution of DVT, I will arrange follow-up for  Denies     HRSN Domestic Abuse - Reported To: Not on file   Housing Stability: Low Risk  (7/13/2025)    Housing Stability Vital Sign     Unable to Pay for Housing in the Last Year: No     Number of Times Moved in the Last Year: 0     Homeless in the Last Year: No        Family History   Problem Relation Age of Onset    High Cholesterol Mother     Alzheimer's Disease Mother     Other Mother         AAA    Bleeding Prob Mother     Cancer Father        REVIEW OF SYSTEMS:      Eyes:      Blurred vision:  No [x]/Yes []               Diplopia:   No [x]/Yes []               Vision loss:       No [x]/Yes []   Ears, nose, throat:             Hearing loss:    No [x]/Yes []      Vertigo:   No [x]/Yes []                       Swallowing problem:  No [x]/Yes []               Nose bleeds:   No [x]/Yes []      Voice hoarseness:  No [x]/Yes []  Respiratory:             Cough:   No [x]/Yes []      Pleuritic chest pain:  No [x]/Yes []                        Dyspnea:   No [x]/Yes []      Wheezing:   No [x]/Yes []  Cardiovascular:             Angina:   No [x]/Yes []      Palpitations:   No [x]/Yes []          Claudication:    No [x]/Yes []      Leg swelling:   No [x]/Yes []  Gastrointestinal:             Nausea or vomiting:  No [x]/Yes []               Abdominal pain:  No [x]/Yes []                     Intestinal bleeding: No [x]/Yes []  Musculoskeletal:             Leg pain:   No [x]/Yes []      Back pain:   No [x]/Yes []                    Weakness:   No [x]/Yes []  Neurologic:             Numbness:   No [x]/Yes []      Paralysis:   No [x]/Yes []                       Headaches:   No [x]/Yes []  Hematologic, lymphatic:   Anemia:   No [x]/Yes []              Bleeding or bruising:  No [x]/Yes []              Fevers or chills: No [x]/Yes []  Endocrine:             Temp intolerance:   No [x]/Yes []                       Polydipsia, polyuria:  No [x]/Yes []  Skin:              Rash:    No [x]/Yes []      Ulcers:   No [x]/Yes []

## 2025-07-14 NOTE — PLAN OF CARE
Problem: Pain  Goal: Verbalizes/displays adequate comfort level or baseline comfort level  7/14/2025 1140 by Saba Avilez RN  Outcome: Progressing  7/13/2025 2302 by Kylee Tirado RN  Outcome: Progressing     Problem: Skin/Tissue Integrity  Goal: Skin integrity remains intact  Description: 1.  Monitor for areas of redness and/or skin breakdown  2.  Assess vascular access sites hourly  3.  Every 4-6 hours minimum:  Change oxygen saturation probe site  4.  Every 4-6 hours:  If on nasal continuous positive airway pressure, respiratory therapy assess nares and determine need for appliance change or resting period  7/14/2025 1140 by Saba Avilez RN  Outcome: Progressing  7/13/2025 2302 by Kylee Tirado RN  Outcome: Progressing     Problem: Safety - Adult  Goal: Free from fall injury  7/14/2025 1140 by Saba Avilez RN  Outcome: Progressing  7/13/2025 2302 by Kylee Tirado RN  Outcome: Progressing     Problem: ABCDS Injury Assessment  Goal: Absence of physical injury  7/14/2025 1140 by Saba Avilez RN  Outcome: Progressing  7/13/2025 2302 by Kylee Tirado RN  Outcome: Progressing

## 2025-07-14 NOTE — PROGRESS NOTES
Called about patient with upper extremity DVT in setting of GIB  Reviewed imaging. RUE axillary + brachial vein DVT  Recommend anticoagulation as able, however in setting of GIB likely need to hold until safe to anticoagulate  No other recommendations, thanks for consult, full note to follow    Chuy Angel MD

## 2025-07-14 NOTE — DISCHARGE INSTR - COC
Continuity of Care Form    Patient Name: Clau De La Vega   :  1950  MRN:  [id number]    Admit date:  2025  Discharge date:  ****    Code Status Order: Prior   Advance Directives:     Admitting Physician:  Alexa Bourgeois MD  PCP: Kelly Shaw MD    Discharging Nurse: *****  Discharging Hospital Unit/Room#: 0641/0641-A  Discharging Unit Phone Number: [phone]    Emergency Contact:   Extended Emergency Contact Information  Primary Emergency Contact: Skinny De La Vega   [facility]  Home Phone: [phone]  Mobile Phone: [phone]  Relation: Child  Secondary Emergency Contact: Ronel De Souza  Home Phone: [phone]  Relation: Brother/Sister  Preferred language: English   needed? No    Past Surgical History:  Past Surgical History:   Procedure Laterality Date    CARDIOVASCULAR STRESS TEST      normal    COLONOSCOPY      Normal.  Re-referred for diagnostic on 9/10    HYSTERECTOMY (CERVIX STATUS UNKNOWN)      IR ASP ABSCESS/HEMATOMA/BULLA/CYST  2025    IR ASP ABSCESS/HEMATOMA/BULLA/CYST 2025 Kirby Ko, PA [facility] SPECIAL PROCEDURES    SHOULDER SURGERY Left     SHOULDER SURGERY Right     TOOTH EXTRACTION Right 2014    TUBAL LIGATION          UPPER GASTROINTESTINAL ENDOSCOPY N/A 2023    EGD ESOPHAGOGASTRODUODENOSCOPY DILATATION performed by Estrella Ruiz MD at [facility] ENDOSCOPY       Immunization History:   Immunization History   Administered Date(s) Administered    COVID-19, PFIZER Bivalent, DO NOT Dilute, (age 12y+), IM, 30 mcg/0.3 mL 10/13/2022    COVID-19, PFIZER PURPLE top, DILUTE for use, (age 12 y+), 30mcg/0.3mL 2021, 2021, 2021    COVID-19, PFIZER, , (age 12y+), IM, 30mcg/0.3mL 10/31/2023, 2024    Influenza 10/11/2012    Pneumococcal, PPSV23, PNEUMOVAX 23, (age 2y+), SC/IM, 0.5mL 2011    TD 5LF, TENIVAC, (age 7y+), IM, 0.5mL 2024    TDaP, ADACEL (age 10y-64y), BOOSTRIX (age 10y+), IM, 0.5mL

## 2025-07-15 ENCOUNTER — ANESTHESIA (OUTPATIENT)
Dept: ENDOSCOPY | Age: 75
End: 2025-07-15
Payer: COMMERCIAL

## 2025-07-15 PROBLEM — E44.0 MODERATE PROTEIN-CALORIE MALNUTRITION: Status: ACTIVE | Noted: 2025-07-15

## 2025-07-15 LAB
ALBUMIN SERPL-MCNC: 2.4 G/DL (ref 3.5–5.2)
ALP SERPL-CCNC: 108 U/L (ref 35–104)
ALT SERPL-CCNC: 11 U/L (ref 0–35)
ANION GAP SERPL CALCULATED.3IONS-SCNC: 12 MMOL/L (ref 7–16)
AST SERPL-CCNC: 27 U/L (ref 0–35)
BILIRUB SERPL-MCNC: 0.3 MG/DL (ref 0–1.2)
BUN SERPL-MCNC: 10 MG/DL (ref 8–23)
CALCIUM SERPL-MCNC: 7.9 MG/DL (ref 8.8–10.2)
CHLORIDE SERPL-SCNC: 105 MMOL/L (ref 98–107)
CO2 SERPL-SCNC: 19 MMOL/L (ref 22–29)
CREAT SERPL-MCNC: 0.9 MG/DL (ref 0.5–1)
ERYTHROCYTE [DISTWIDTH] IN BLOOD BY AUTOMATED COUNT: 17.7 % (ref 11.5–15)
GFR, ESTIMATED: 68 ML/MIN/1.73M2
GLUCOSE SERPL-MCNC: 77 MG/DL (ref 74–99)
HCT VFR BLD AUTO: 24.9 % (ref 34–48)
HCT VFR BLD AUTO: 25.3 % (ref 34–48)
HGB BLD-MCNC: 8.6 G/DL (ref 11.5–15.5)
HGB BLD-MCNC: 8.8 G/DL (ref 11.5–15.5)
MCH RBC QN AUTO: 28.9 PG (ref 26–35)
MCHC RBC AUTO-ENTMCNC: 34.8 G/DL (ref 32–34.5)
MCV RBC AUTO: 83.2 FL (ref 80–99.9)
PLATELET # BLD AUTO: 346 K/UL (ref 130–450)
PMV BLD AUTO: 8.4 FL (ref 7–12)
POTASSIUM SERPL-SCNC: 3.4 MMOL/L (ref 3.5–5.1)
PROT SERPL-MCNC: 6.1 G/DL (ref 6.4–8.3)
RBC # BLD AUTO: 3.04 M/UL (ref 3.5–5.5)
SODIUM SERPL-SCNC: 136 MMOL/L (ref 136–145)
WBC OTHER # BLD: 11.6 K/UL (ref 4.5–11.5)

## 2025-07-15 PROCEDURE — 6370000000 HC RX 637 (ALT 250 FOR IP): Performed by: REGISTERED NURSE

## 2025-07-15 PROCEDURE — 3609027000 HC COLONOSCOPY: Performed by: SURGERY

## 2025-07-15 PROCEDURE — 2709999900 HC NON-CHARGEABLE SUPPLY: Performed by: SURGERY

## 2025-07-15 PROCEDURE — 85027 COMPLETE CBC AUTOMATED: CPT

## 2025-07-15 PROCEDURE — 3700000000 HC ANESTHESIA ATTENDED CARE: Performed by: SURGERY

## 2025-07-15 PROCEDURE — 2580000003 HC RX 258: Performed by: REGISTERED NURSE

## 2025-07-15 PROCEDURE — 6370000000 HC RX 637 (ALT 250 FOR IP)

## 2025-07-15 PROCEDURE — 3609017100 HC EGD: Performed by: SURGERY

## 2025-07-15 PROCEDURE — 7100000010 HC PHASE II RECOVERY - FIRST 15 MIN: Performed by: SURGERY

## 2025-07-15 PROCEDURE — 2580000003 HC RX 258: Performed by: NURSE ANESTHETIST, CERTIFIED REGISTERED

## 2025-07-15 PROCEDURE — 0DJ08ZZ INSPECTION OF UPPER INTESTINAL TRACT, VIA NATURAL OR ARTIFICIAL OPENING ENDOSCOPIC: ICD-10-PCS | Performed by: SURGERY

## 2025-07-15 PROCEDURE — 6360000002 HC RX W HCPCS: Performed by: REGISTERED NURSE

## 2025-07-15 PROCEDURE — 85014 HEMATOCRIT: CPT

## 2025-07-15 PROCEDURE — 2580000003 HC RX 258

## 2025-07-15 PROCEDURE — 6360000002 HC RX W HCPCS: Performed by: NURSE ANESTHETIST, CERTIFIED REGISTERED

## 2025-07-15 PROCEDURE — 6370000000 HC RX 637 (ALT 250 FOR IP): Performed by: INTERNAL MEDICINE

## 2025-07-15 PROCEDURE — 0DJD8ZZ INSPECTION OF LOWER INTESTINAL TRACT, VIA NATURAL OR ARTIFICIAL OPENING ENDOSCOPIC: ICD-10-PCS | Performed by: SURGERY

## 2025-07-15 PROCEDURE — 45378 DIAGNOSTIC COLONOSCOPY: CPT | Performed by: SURGERY

## 2025-07-15 PROCEDURE — 3700000001 HC ADD 15 MINUTES (ANESTHESIA): Performed by: SURGERY

## 2025-07-15 PROCEDURE — 2060000000 HC ICU INTERMEDIATE R&B

## 2025-07-15 PROCEDURE — 43235 EGD DIAGNOSTIC BRUSH WASH: CPT | Performed by: SURGERY

## 2025-07-15 PROCEDURE — 80053 COMPREHEN METABOLIC PANEL: CPT

## 2025-07-15 PROCEDURE — 85018 HEMOGLOBIN: CPT

## 2025-07-15 PROCEDURE — 7100000011 HC PHASE II RECOVERY - ADDTL 15 MIN: Performed by: SURGERY

## 2025-07-15 RX ORDER — SODIUM CHLORIDE 9 MG/ML
INJECTION, SOLUTION INTRAVENOUS
Status: DISCONTINUED | OUTPATIENT
Start: 2025-07-15 | End: 2025-07-15 | Stop reason: SDUPTHER

## 2025-07-15 RX ORDER — PROPOFOL 10 MG/ML
INJECTION, EMULSION INTRAVENOUS
Status: DISCONTINUED | OUTPATIENT
Start: 2025-07-15 | End: 2025-07-15 | Stop reason: SDUPTHER

## 2025-07-15 RX ADMIN — DULOXETINE 20 MG: 20 CAPSULE, DELAYED RELEASE ORAL at 09:13

## 2025-07-15 RX ADMIN — AMLODIPINE BESYLATE 10 MG: 10 TABLET ORAL at 09:13

## 2025-07-15 RX ADMIN — NAFCILLIN SODIUM 2000 MG: 2 INJECTION, POWDER, LYOPHILIZED, FOR SOLUTION INTRAMUSCULAR; INTRAVENOUS at 13:41

## 2025-07-15 RX ADMIN — NAFCILLIN SODIUM 2000 MG: 2 INJECTION, POWDER, LYOPHILIZED, FOR SOLUTION INTRAMUSCULAR; INTRAVENOUS at 09:20

## 2025-07-15 RX ADMIN — FLUCONAZOLE 400 MG: 150 TABLET ORAL at 09:13

## 2025-07-15 RX ADMIN — RIFAMPIN 300 MG: 300 CAPSULE ORAL at 21:13

## 2025-07-15 RX ADMIN — OXYCODONE 5 MG: 5 TABLET ORAL at 13:29

## 2025-07-15 RX ADMIN — FAMOTIDINE 20 MG: 20 TABLET, FILM COATED ORAL at 21:13

## 2025-07-15 RX ADMIN — NAFCILLIN SODIUM 2000 MG: 2 INJECTION, POWDER, LYOPHILIZED, FOR SOLUTION INTRAMUSCULAR; INTRAVENOUS at 05:18

## 2025-07-15 RX ADMIN — NAFCILLIN SODIUM 2000 MG: 2 INJECTION, POWDER, LYOPHILIZED, FOR SOLUTION INTRAMUSCULAR; INTRAVENOUS at 21:14

## 2025-07-15 RX ADMIN — NAFCILLIN SODIUM 2000 MG: 2 INJECTION, POWDER, LYOPHILIZED, FOR SOLUTION INTRAMUSCULAR; INTRAVENOUS at 00:31

## 2025-07-15 RX ADMIN — PROPOFOL 150 MG: 10 INJECTION, EMULSION INTRAVENOUS at 11:51

## 2025-07-15 RX ADMIN — GABAPENTIN 200 MG: 100 CAPSULE ORAL at 21:13

## 2025-07-15 RX ADMIN — SUCRALFATE 1 G: 1 TABLET ORAL at 17:26

## 2025-07-15 RX ADMIN — PANTOPRAZOLE SODIUM 40 MG: 40 TABLET, DELAYED RELEASE ORAL at 17:26

## 2025-07-15 RX ADMIN — SUCRALFATE 1 G: 1 TABLET ORAL at 21:13

## 2025-07-15 RX ADMIN — NAFCILLIN SODIUM 2000 MG: 2 INJECTION, POWDER, LYOPHILIZED, FOR SOLUTION INTRAMUSCULAR; INTRAVENOUS at 17:32

## 2025-07-15 RX ADMIN — SODIUM CHLORIDE, SODIUM LACTATE, POTASSIUM CHLORIDE, AND CALCIUM CHLORIDE: .6; .31; .03; .02 INJECTION, SOLUTION INTRAVENOUS at 05:16

## 2025-07-15 RX ADMIN — OXYCODONE 5 MG: 5 TABLET ORAL at 05:15

## 2025-07-15 RX ADMIN — GABAPENTIN 200 MG: 100 CAPSULE ORAL at 13:29

## 2025-07-15 RX ADMIN — RIFAMPIN 300 MG: 300 CAPSULE ORAL at 09:13

## 2025-07-15 RX ADMIN — SODIUM CHLORIDE: 9 INJECTION, SOLUTION INTRAVENOUS at 11:39

## 2025-07-15 RX ADMIN — GABAPENTIN 200 MG: 100 CAPSULE ORAL at 09:12

## 2025-07-15 RX ADMIN — SODIUM CHLORIDE, SODIUM LACTATE, POTASSIUM CHLORIDE, AND CALCIUM CHLORIDE: .6; .31; .03; .02 INJECTION, SOLUTION INTRAVENOUS at 13:40

## 2025-07-15 RX ADMIN — CALCIUM CARBONATE-VITAMIN D TAB 500 MG-200 UNIT 1 TABLET: 500-200 TAB at 21:13

## 2025-07-15 RX ADMIN — OXYCODONE 5 MG: 5 TABLET ORAL at 21:12

## 2025-07-15 ASSESSMENT — PAIN DESCRIPTION - LOCATION
LOCATION: HIP
LOCATION: LEG
LOCATION: BACK

## 2025-07-15 ASSESSMENT — PAIN SCALES - GENERAL
PAINLEVEL_OUTOF10: 8
PAINLEVEL_OUTOF10: 8
PAINLEVEL_OUTOF10: 3
PAINLEVEL_OUTOF10: 0
PAINLEVEL_OUTOF10: 0
PAINLEVEL_OUTOF10: 7
PAINLEVEL_OUTOF10: 0
PAINLEVEL_OUTOF10: 4

## 2025-07-15 ASSESSMENT — LIFESTYLE VARIABLES: SMOKING_STATUS: 0

## 2025-07-15 ASSESSMENT — PAIN DESCRIPTION - DESCRIPTORS
DESCRIPTORS: ACHING;DISCOMFORT
DESCRIPTORS: SORE;DULL;DISCOMFORT

## 2025-07-15 ASSESSMENT — PAIN DESCRIPTION - ORIENTATION
ORIENTATION: RIGHT
ORIENTATION: LEFT
ORIENTATION: LOWER

## 2025-07-15 ASSESSMENT — PAIN DESCRIPTION - PAIN TYPE: TYPE: SURGICAL PAIN

## 2025-07-15 ASSESSMENT — COPD QUESTIONNAIRES: CAT_SEVERITY: MILD

## 2025-07-15 ASSESSMENT — PAIN DESCRIPTION - ONSET: ONSET: SUDDEN

## 2025-07-15 ASSESSMENT — PAIN DESCRIPTION - FREQUENCY: FREQUENCY: INTERMITTENT

## 2025-07-15 ASSESSMENT — PAIN - FUNCTIONAL ASSESSMENT: PAIN_FUNCTIONAL_ASSESSMENT: ACTIVITIES ARE NOT PREVENTED

## 2025-07-15 NOTE — ANESTHESIA POSTPROCEDURE EVALUATION
Department of Anesthesiology  Postprocedure Note    Patient: Clau De La Vega  MRN: [id number]  YOB: 1950  Date of evaluation: 7/15/2025    Procedure Summary       Date: 07/15/25 Room / Location: [place] [address] / [facility]    Anesthesia Start: 1145 Anesthesia Stop: 1210    Procedures:       ESOPHAGOGASTRODUODENOSCOPY      COLONOSCOPY DIAGNOSTIC         ++LATEX ALLERGY++ Diagnosis:       Gastrointestinal hemorrhage, unspecified gastrointestinal hemorrhage type      (Gastrointestinal hemorrhage, unspecified gastrointestinal hemorrhage type [K92.2])    Surgeons: Rufina Epps MD Responsible Provider: Robson Curtis MD    Anesthesia Type: MAC ASA Status: 4            Anesthesia Type: No value filed.    Miguel Angel Phase I:      Miguel Angel Phase II: Miguel Angel Score: 9    Anesthesia Post Evaluation    Patient location during evaluation: PACU  Patient participation: complete - patient participated  Level of consciousness: awake and alert  Airway patency: patent  Nausea & Vomiting: no nausea and no vomiting  Cardiovascular status: hemodynamically stable  Respiratory status: acceptable  Hydration status: euvolemic  There was medical reason for not using a multimodal analgesia pain management approach.Pain management: adequate    No notable events documented.

## 2025-07-15 NOTE — PROGRESS NOTES
GENERAL SURGERY  DAILY PROGRESS NOTE    Patient's Name/Date of Birth: Clau De La Vega / 1950    Date: July 15, 2025     Chief Complaint   Patient presents with    GI Problem     Per [facility] pt has had blood in her stools, emesis and nose bleeds since starting Heparin drip on 7/10 (due to blood clot in the right arm where previous midline was placed). Pt on IV ATB for right hip infection.          Subjective:  Patient was not drinking her GoLytely prep in a timely manner therefore it was changed to Gatorade prep.  She still did not drink much of that.  She had a few bowel movements but her stool is not clear at all.       Objective:  Last 24Hrs  Temp  Av.1 °F (36.7 °C)  Min: 97.6 °F (36.4 °C)  Max: 99 °F (37.2 °C)  Resp  Av.7  Min: 16  Max: 18  Pulse  Av  Min: 80  Max: 100  Systolic (24hrs), Av , Min:125 , Max:140     Diastolic (24hrs), Av, Min:54, Max:68    SpO2  Av.5 %  Min: 99 %  Max: 100 %    I/O last 3 completed shifts:  In: 746 [Blood:746]  Out: 600 [Urine:600]      General: In no acute distress  Cardiovascular: Warm throughout, no edema  Respiratory: no respiratory distress, equal chest rise  Abdomen: soft, nontender, nondistended  Skin: no obvious rashes or lesions appreciated  Extremities: atraumatic, no focal motor deficits      CBC  Recent Labs     25  2257 25  0422 25  1815 25  0630 25  1213 25  1829 07/15/25  0115   WBC 11.6* 13.1*  --  12.9*  --   --   --    RBC 2.20* 2.01*  --  3.20*  --   --   --    HGB 6.0* 5.4*   < > 9.2* 9.5* 8.8* 8.6*   HCT 19.0* 16.9*   < > 26.8* 27.3* 26.4* 24.9*   MCV 86.4 84.1  --  83.8  --   --   --    MCH 27.3 26.9  --  28.8  --   --   --    MCHC 31.6* 32.0  --  34.3  --   --   --    RDW 19.6* 19.9*  --  17.6*  --   --   --    * 439  --  354  --   --   --    MPV 8.9 8.7  --  8.9  --   --   --     < > = values in this interval not displayed.       [facility]  Recent Labs     25  0423

## 2025-07-15 NOTE — PROGRESS NOTES
Comprehensive Nutrition Assessment    Type and Reason for Visit:  Initial, Positive nutrition screen    Nutrition Recommendations/Plan:   Start Frozen  ONS BID + Wound Healing ONS BID  Diet per Orders  Continue Inpatient Monitoring     Malnutrition Assessment:  Malnutrition Status:  Moderate malnutrition (07/15/25 1432)    Context:  Acute Illness     Findings of the 6 clinical characteristics of malnutrition:  Energy Intake:  Mild decrease in energy intake  Weight Loss:  Mild weight loss     Body Fat Loss:  Mild body fat loss Triceps, Orbital   Muscle Mass Loss:  Mild muscle mass loss Clavicles (pectoralis & deltoids), Scapula (trapezius)  Fluid Accumulation:  Unable to assess (Multifactorial)     Strength:  Not Performed    Nutrition Assessment:    Pt adm GIB PMH s/p 5/31 Removal of R Hip Joint Prosthesis d/t infection-s/p 6/6 revision + VAC placement + I+D 6/11, s/p SLAVA 2023, COPD, CKD3, TIA. PTA pt states she was at [facility] appetite has been decreased since surgery in May, felt she has lost wt unsure of amount. States she was on Ensure ONS at LTAC is tired of these, Pt meets criteria for Moderate Malnutrition, will start Frozen ONS BID, Wound Healing ONS BID to ^kcals/pro/optimize nutrition    Nutrition Related Findings:    I/O WDL, A/O x4, +BS, +BM 7/13, no edema, s/p EGD/Colonsocopy today no  source of GIB found,  K+ 3.4, Ocuvite, ferrous sulfate, folic acid, IVF LR Wound Type: Surgical Incision       Current Nutrition Intake & Therapies:    Average Meal Intake: Unable to assess (none recorded)  Average Supplements Intake: None Ordered  ADULT DIET; Regular    Anthropometric Measures:  Height: 147.3 cm (4' 9.99\")  Ideal Body Weight (IBW): 90 lbs (41 kg)    Admission Body Weight: 62.2 kg (137 lb 2 oz)  Current Body Weight: 62.2 kg (137 lb 2 oz), 152.4 % IBW. Weight Source: Bed scale (7/15)  Current BMI (kg/m2): 28.7  Usual Body Weight: 62.6 kg (138 lb 0.1 oz) (5/6/25 OV EMR)     % Weight Change (Calculated):  -0.6  Weight Adjustment For: No Adjustment                 BMI Categories: Overweight (BMI 25.0-29.9)    Estimated Daily Nutrient Needs:  Energy Requirements Based On: Formula  Weight Used for Energy Requirements: Admission  Energy (kcal/day): 2549-7916 kcals  Weight Used for Protein Requirements: Ideal  Protein (g/day): 55-60g  Method Used for Fluid Requirements: 1 ml/kcal  Fluid (ml/day): 1400-1500ml    Nutrition Diagnosis:   Moderate malnutrition, in context of acute illness or injury related to decreased appetite as evidenced by criteria as identified in malnutrition assessment    Nutrition Interventions:   Food and/or Nutrient Delivery: Continue Current Diet, Start Oral Nutrition Supplement  Nutrition Education/Counseling: No recommendation at this time  Coordination of Nutrition Care: Continue to monitor while inpatient       Goals:  Goals: Meet at least 75% of estimated needs  Type of Goal: New goal       Nutrition Monitoring and Evaluation:   Behavioral-Environmental Outcomes: None Identified  Food/Nutrient Intake Outcomes: Food and Nutrient Intake, Supplement Intake  Physical Signs/Symptoms Outcomes: Biochemical Data, GI Status, Fluid Status or Edema, Nutrition Focused Physical Findings, Skin, Weight    Discharge Planning:    Continue Oral Nutrition Supplement     Shruthi Gallardo RD  Contact: 1936

## 2025-07-15 NOTE — PROGRESS NOTES
Physical Therapy  Facility/Department: [address] INTERMEDIATE      Name: Clau De La Vega  : 1950  MRN: [id number]    Chart reviewed and PT mt attempted this am.  Pt out of the room at this time.  Will check back at later time/date.     Humaira Rothman, [place] [id number]

## 2025-07-15 NOTE — PROGRESS NOTES
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    Past Surgical History:   Past Surgical History:   Procedure Laterality Date    CARDIOVASCULAR STRESS TEST  2003    normal    COLONOSCOPY  2006    Normal.  Re-referred for diagnostic on 9/10    HYSTERECTOMY (CERVIX STATUS UNKNOWN)      IR ASP ABSCESS/HEMATOMA/BULLA/CYST  5/29/2025    IR ASP ABSCESS/HEMATOMA/BULLA/CYST 5/29/2025 Kirby Ko, PA [facility] SPECIAL PROCEDURES    SHOULDER SURGERY Left     SHOULDER SURGERY Right     TOOTH EXTRACTION Right October 2014    TUBAL LIGATION      1978    UPPER GASTROINTESTINAL ENDOSCOPY N/A 8/16/2023    EGD ESOPHAGOGASTRODUODENOSCOPY DILATATION performed by Estrella Ruiz MD at [facility] ENDOSCOPY       Social History:   reports that she has quit smoking. Her smoking use included cigarettes. She has a 20 pack-year smoking history. She has never used smokeless tobacco. She reports that she does not drink alcohol and does not use drugs.     Family History:   Family History   Problem Relation Age of Onset    High Cholesterol Mother     Alzheimer's Disease Mother     Other Mother         AAA    Bleeding Prob Mother     Cancer Father        Subjective    Clinically improving. Feeling better.    Stable overnight. No other overnight issues reported.  normal. GI/Bowel: The stomach is normal.  The small bowel and colon are nondilated. The appendix is not seen. Pelvis: The uterus is atrophic or absent.  No suspicious adnexal masses. Peritoneum/Retroperitoneum: No bulky abdominal or pelvic lymphadenopathy.  No ascites or pneumoperitoneum. Bones/Soft Tissues: Surgical staples overlie the right hip.  There is skin thickening of the lateral right hip with a deep subcutaneous collection measuring 5.0 x 2.8 x 11.9 cm.     No acute finding in the abdomen or pelvis. Postsurgical changes of the right hip.  There is skin thickening of the lateral right hip with a deep subcutaneous collection within the lateral right thigh measuring 5.0 x 2.8 x 11.9 cm. Soft tissue nodules in the right breast measuring 1.2 cm.  Correlate with recent mammogram 05/29/2025.        ASSESSMENT:    Principal Problem:    GIB (gastrointestinal bleeding)  Active Problems:    Melena  Resolved Problems:    * No resolved hospital problems. *       PLAN:    She is still stable cardiovascularly  Hemoglobin is stable at 7.1  Electrolytes are okay for now  Mild acidosis  Creatinine hovering at 1.3 but better than she was yesterday and CKD is her baseline  Awaiting scopes tomorrow both upper and lower.  Maintaining her treatment for MSSA septicemia with right hip prosthetic joint infection and she is finishing her nafcillin tomorrow  No anticoagulation for right upper extremity DVT.  [facility] did show the same clot and vascular on board    Diet: ADULT DIET; Clear Liquid; No Carbonated Beverages, No red dye  Diet NPO Exceptions are: Sips of Water with Meds  Code Status: Prior    DVT Prophylaxis: Unable due to GI bleed  Recommended disposition at discharge: Already in place and pre-CERT awaited    +++++++++++++++++++++++++++++++++++++++++++++++++  Alexa Bourgeois MD   [facility].  +++++++++++++++++++++++++++++++++++++++++++++++++  NOTE: This report was transcribed

## 2025-07-15 NOTE — PROGRESS NOTES
No upper or lower GI source of bleeding seen on EGD and colonoscopy. Ok to restart anticoagulation. If rebleeds, will need further workup - capsule endoscopy as an outpatient.    Rufina Epps MD

## 2025-07-15 NOTE — CARE COORDINATION
Social Work / Discharge PLanning :SW followed  up with patient. Plan is SNF at discharge. Initially patient wanted [facility] but did request today: [facility] and [facility] referrals placed If either one of those SNF does NOT have a bed or accepts her insurance, then agreeable to [facility]. [facility] aware. Await if they can accept. Will need pre-cert. SW to follow. Electronically signed by NATASHA Mullen on 7/15/25 at 2:34 PM EDT

## 2025-07-15 NOTE — OP NOTE
Operative Note: EGD and Colonoscopy    Clau De La Vega     DATE OF PROCEDURE: 7/15/2025  SURGEON: Dr. SHANT LAMB MD, M.D.     PREOPERATIVE DIAGNOSES:   Melena        POSTOPERATIVE DIAGNOSES:   GERD    Moderate prep, no obvious colon abnormality    OPERATION:    EGD esophagogastroduodenoscopy                   Colonoscopy to the cecum    SPECIMENS:  * No specimens in log *    BLOOD LOSS: Minimal    ANESTHESIA: LMAC    CONSENT AND INDICATIONS:  This is a 74 y.o. year old female who is having the above. I have discussed with the patient and/or the patient representative the indication, alternatives, and the possible risks and/or complications of the planned procedure and the anesthesia methods. The patient and/or patient representative appear to understand and agree to proceed.    PROCEDURE: The patient was placed on the table and sedated via LMAC. Bite block was placed. A lubricated scope was easily passed into the upper esophagus which looked normal. The distal esophagus looked abnormal: GERD.  The scope was passed into the stomach and retroflexed. There was no hiatal hernia. The scope was passed down toward the pylorus. The antral mucosa all looked normal. The scope was then passed through the pylorus into the duodenal bulb which looked normal, then around to the distal duodenum which looked normal, and the scope was then withdrawn.     The patient was then placed in left lateral decubitus position. A rectal exam was done and no masses were felt.  A lubricated scope was passed into the rectum which looked normal.  The scope was passed all the way around to the cecum. No obvious abnormality was found, though there was some thick liquid stool throughout the colon that could have obscured a small abnormality. The TI and appendiceal orifice were identified.  The scope was then slowly withdrawn, each area was examined again on the way out.  The scope was retroflexed in the rectum and it was normal. Withdrawal

## 2025-07-15 NOTE — PROGRESS NOTES
[facility]  [facility]  Progress Note    SUBJECTIVE:  Chief Complaint   Patient presents with    GI Problem     Per [facility] pt has had blood in her stools, emesis and nose bleeds since starting Heparin drip on 7/10 (due to blood clot in the right arm where previous midline was placed). Pt on IV ATB for right hip infection.       Patient is tolerating medications. No reported adverse drug reactions.  No nausea, vomiting, diarrhea.  No fever  Resting in bed   No complaints   No fevers    Review of systems:  As stated above in the chief complaint, otherwise negative.    Medications:  Scheduled Meds:   pantoprazole  40 mg Oral BID AC    sucralfate  1 g Oral 4x Daily AC & HS    amLODIPine  10 mg Oral Daily    [Held by provider] aspirin  81 mg Oral BID    oyster shell calcium w/D  1 tablet Oral BID    DULoxetine  20 mg Oral Daily    famotidine  20 mg Oral Daily    ferrous sulfate  325 mg Oral Every Other Day    fluconazole  400 mg Oral Daily    folic acid  1 mg Oral Daily    gabapentin  200 mg Oral TID    levothyroxine  50 mcg Oral Daily    ocuvite-lutein  1 tablet Oral Daily    rifAMPin  300 mg Oral BID    nafcillin  2,000 mg IntraVENous Q4H     Continuous Infusions:   lactated ringers 50 mL/hr at 07/15/25 0516    sodium chloride      sodium chloride       PRN Meds:sodium chloride, acetaminophen, aluminum & magnesium hydroxide-simethicone, diphenhydrAMINE, melatonin, methocarbamol, ondansetron, oxyCODONE, sodium chloride    OBJECTIVE:  BP (!) 143/60   Pulse 78   Temp 98.1 °F (36.7 °C) (Oral)   Resp 18   Ht 1.473 m (4' 10\")   Wt 54.4 kg (120 lb)   SpO2 100%   BMI 25.08 kg/m²   Temp  Av.2 °F (36.8 °C)  Min: 97.7 °F (36.5 °C)  Max: 99 °F (37.2 °C)  Constitutional: The patient is awake, alert, and oriented.   Skin: Warm and dry. No rashes were noted.   HEENT: Round and reactive pupils.  Moist mucous membranes.  No ulcerations or thrush.  Neck: Supple to movements.   Chest:

## 2025-07-15 NOTE — PLAN OF CARE
Problem: Pain  Goal: Verbalizes/displays adequate comfort level or baseline comfort level  7/14/2025 1140 by Saba Avilez RN  Outcome: Progressing     Problem: Skin/Tissue Integrity  Goal: Skin integrity remains intact  Description: 1.  Monitor for areas of redness and/or skin breakdown  2.  Assess vascular access sites hourly  3.  Every 4-6 hours minimum:  Change oxygen saturation probe site  4.  Every 4-6 hours:  If on nasal continuous positive airway pressure, respiratory therapy assess nares and determine need for appliance change or resting period  7/15/2025 0109 by Kati Cabrera, RN  Outcome: Progressing  7/14/2025 1140 by Saba Avilez RN  Outcome: Progressing     Problem: Safety - Adult  Goal: Free from fall injury  7/15/2025 0109 by Kati Cabrera, RN  Outcome: Progressing  7/14/2025 1140 by Saba Avilez, RN  Outcome: Progressing     Problem: ABCDS Injury Assessment  Goal: Absence of physical injury  7/14/2025 1140 by Saba Avilez, RN  Outcome: Progressing

## 2025-07-16 LAB
ALBUMIN SERPL-MCNC: 2.3 G/DL (ref 3.5–5.2)
ALP SERPL-CCNC: 109 U/L (ref 35–104)
ALT SERPL-CCNC: 10 U/L (ref 0–35)
ANION GAP SERPL CALCULATED.3IONS-SCNC: 10 MMOL/L (ref 7–16)
ANION GAP SERPL CALCULATED.3IONS-SCNC: 12 MMOL/L (ref 7–16)
AST SERPL-CCNC: 27 U/L (ref 0–35)
BILIRUB SERPL-MCNC: 0.4 MG/DL (ref 0–1.2)
BUN SERPL-MCNC: 5 MG/DL (ref 8–23)
BUN SERPL-MCNC: 8 MG/DL (ref 8–23)
CALCIUM SERPL-MCNC: 7.8 MG/DL (ref 8.8–10.2)
CALCIUM SERPL-MCNC: 8.2 MG/DL (ref 8.8–10.2)
CHLORIDE SERPL-SCNC: 106 MMOL/L (ref 98–107)
CHLORIDE SERPL-SCNC: 107 MMOL/L (ref 98–107)
CO2 SERPL-SCNC: 18 MMOL/L (ref 22–29)
CO2 SERPL-SCNC: 19 MMOL/L (ref 22–29)
CREAT SERPL-MCNC: 0.7 MG/DL (ref 0.5–1)
CREAT SERPL-MCNC: 0.7 MG/DL (ref 0.5–1)
ERYTHROCYTE [DISTWIDTH] IN BLOOD BY AUTOMATED COUNT: 18 % (ref 11.5–15)
GFR, ESTIMATED: >90 ML/MIN/1.73M2
GFR, ESTIMATED: >90 ML/MIN/1.73M2
GLUCOSE SERPL-MCNC: 112 MG/DL (ref 74–99)
GLUCOSE SERPL-MCNC: 90 MG/DL (ref 74–99)
HCT VFR BLD AUTO: 26 % (ref 34–48)
HGB BLD-MCNC: 8.5 G/DL (ref 11.5–15.5)
MCH RBC QN AUTO: 28.3 PG (ref 26–35)
MCHC RBC AUTO-ENTMCNC: 32.7 G/DL (ref 32–34.5)
MCV RBC AUTO: 86.7 FL (ref 80–99.9)
PLATELET # BLD AUTO: 364 K/UL (ref 130–450)
PMV BLD AUTO: 8.3 FL (ref 7–12)
POTASSIUM SERPL-SCNC: 2.7 MMOL/L (ref 3.5–5.1)
POTASSIUM SERPL-SCNC: 3.8 MMOL/L (ref 3.5–5.1)
PROT SERPL-MCNC: 6 G/DL (ref 6.4–8.3)
RBC # BLD AUTO: 3 M/UL (ref 3.5–5.5)
SODIUM SERPL-SCNC: 135 MMOL/L (ref 136–145)
SODIUM SERPL-SCNC: 136 MMOL/L (ref 136–145)
WBC OTHER # BLD: 11.3 K/UL (ref 4.5–11.5)

## 2025-07-16 PROCEDURE — 99232 SBSQ HOSP IP/OBS MODERATE 35: CPT | Performed by: SURGERY

## 2025-07-16 PROCEDURE — 6360000002 HC RX W HCPCS: Performed by: INTERNAL MEDICINE

## 2025-07-16 PROCEDURE — 6360000002 HC RX W HCPCS: Performed by: REGISTERED NURSE

## 2025-07-16 PROCEDURE — 2580000003 HC RX 258

## 2025-07-16 PROCEDURE — 80053 COMPREHEN METABOLIC PANEL: CPT

## 2025-07-16 PROCEDURE — 2580000003 HC RX 258: Performed by: REGISTERED NURSE

## 2025-07-16 PROCEDURE — 6370000000 HC RX 637 (ALT 250 FOR IP)

## 2025-07-16 PROCEDURE — 97161 PT EVAL LOW COMPLEX 20 MIN: CPT

## 2025-07-16 PROCEDURE — 97165 OT EVAL LOW COMPLEX 30 MIN: CPT

## 2025-07-16 PROCEDURE — 85027 COMPLETE CBC AUTOMATED: CPT

## 2025-07-16 PROCEDURE — 2060000000 HC ICU INTERMEDIATE R&B

## 2025-07-16 PROCEDURE — 80048 BASIC METABOLIC PNL TOTAL CA: CPT

## 2025-07-16 PROCEDURE — 6370000000 HC RX 637 (ALT 250 FOR IP): Performed by: INTERNAL MEDICINE

## 2025-07-16 RX ORDER — POTASSIUM CHLORIDE 7.45 MG/ML
10 INJECTION INTRAVENOUS
Status: COMPLETED | OUTPATIENT
Start: 2025-07-16 | End: 2025-07-16

## 2025-07-16 RX ORDER — POTASSIUM CHLORIDE 1500 MG/1
40 TABLET, EXTENDED RELEASE ORAL 2 TIMES DAILY WITH MEALS
Status: COMPLETED | OUTPATIENT
Start: 2025-07-16 | End: 2025-07-18

## 2025-07-16 RX ADMIN — SODIUM CHLORIDE, SODIUM LACTATE, POTASSIUM CHLORIDE, AND CALCIUM CHLORIDE: .6; .31; .03; .02 INJECTION, SOLUTION INTRAVENOUS at 06:05

## 2025-07-16 RX ADMIN — SUCRALFATE 1 G: 1 TABLET ORAL at 20:14

## 2025-07-16 RX ADMIN — CALCIUM CARBONATE-VITAMIN D TAB 500 MG-200 UNIT 1 TABLET: 500-200 TAB at 20:14

## 2025-07-16 RX ADMIN — Medication 6 MG: at 20:17

## 2025-07-16 RX ADMIN — OXYCODONE 5 MG: 5 TABLET ORAL at 02:13

## 2025-07-16 RX ADMIN — METHOCARBAMOL TABLETS 500 MG: 500 TABLET, COATED ORAL at 11:28

## 2025-07-16 RX ADMIN — FLUCONAZOLE 400 MG: 150 TABLET ORAL at 08:20

## 2025-07-16 RX ADMIN — PANTOPRAZOLE SODIUM 40 MG: 40 TABLET, DELAYED RELEASE ORAL at 16:47

## 2025-07-16 RX ADMIN — OXYCODONE 5 MG: 5 TABLET ORAL at 11:40

## 2025-07-16 RX ADMIN — I-VITE, TAB 1000-60-2MG (60/BT) 1 TABLET: TAB at 08:21

## 2025-07-16 RX ADMIN — PANTOPRAZOLE SODIUM 40 MG: 40 TABLET, DELAYED RELEASE ORAL at 06:05

## 2025-07-16 RX ADMIN — POTASSIUM CHLORIDE 10 MEQ: 7.46 INJECTION, SOLUTION INTRAVENOUS at 08:15

## 2025-07-16 RX ADMIN — DULOXETINE 20 MG: 20 CAPSULE, DELAYED RELEASE ORAL at 08:20

## 2025-07-16 RX ADMIN — POTASSIUM CHLORIDE 10 MEQ: 7.46 INJECTION, SOLUTION INTRAVENOUS at 06:06

## 2025-07-16 RX ADMIN — OXYCODONE 5 MG: 5 TABLET ORAL at 23:51

## 2025-07-16 RX ADMIN — POTASSIUM CHLORIDE 40 MEQ: 20 TABLET, EXTENDED RELEASE ORAL at 16:47

## 2025-07-16 RX ADMIN — GABAPENTIN 200 MG: 100 CAPSULE ORAL at 20:14

## 2025-07-16 RX ADMIN — SUCRALFATE 1 G: 1 TABLET ORAL at 06:05

## 2025-07-16 RX ADMIN — FOLIC ACID 1 MG: 1 TABLET ORAL at 08:20

## 2025-07-16 RX ADMIN — CALCIUM CARBONATE-VITAMIN D TAB 500 MG-200 UNIT 1 TABLET: 500-200 TAB at 08:20

## 2025-07-16 RX ADMIN — NAFCILLIN SODIUM 2000 MG: 2 INJECTION, POWDER, LYOPHILIZED, FOR SOLUTION INTRAMUSCULAR; INTRAVENOUS at 02:13

## 2025-07-16 RX ADMIN — AMLODIPINE BESYLATE 10 MG: 10 TABLET ORAL at 08:21

## 2025-07-16 RX ADMIN — SUCRALFATE 1 G: 1 TABLET ORAL at 11:28

## 2025-07-16 RX ADMIN — GABAPENTIN 200 MG: 100 CAPSULE ORAL at 14:02

## 2025-07-16 RX ADMIN — LEVOTHYROXINE SODIUM 50 MCG: 0.05 TABLET ORAL at 06:05

## 2025-07-16 RX ADMIN — GABAPENTIN 200 MG: 100 CAPSULE ORAL at 08:20

## 2025-07-16 RX ADMIN — POTASSIUM CHLORIDE 40 MEQ: 20 TABLET, EXTENDED RELEASE ORAL at 08:21

## 2025-07-16 RX ADMIN — SUCRALFATE 1 G: 1 TABLET ORAL at 16:47

## 2025-07-16 RX ADMIN — FAMOTIDINE 20 MG: 20 TABLET, FILM COATED ORAL at 20:14

## 2025-07-16 RX ADMIN — POTASSIUM CHLORIDE 10 MEQ: 7.46 INJECTION, SOLUTION INTRAVENOUS at 06:54

## 2025-07-16 ASSESSMENT — PAIN DESCRIPTION - LOCATION
LOCATION: HIP
LOCATION: GENERALIZED
LOCATION: BACK

## 2025-07-16 ASSESSMENT — PAIN - FUNCTIONAL ASSESSMENT: PAIN_FUNCTIONAL_ASSESSMENT: ACTIVITIES ARE NOT PREVENTED

## 2025-07-16 ASSESSMENT — PAIN SCALES - GENERAL
PAINLEVEL_OUTOF10: 8
PAINLEVEL_OUTOF10: 4
PAINLEVEL_OUTOF10: 7
PAINLEVEL_OUTOF10: 6

## 2025-07-16 ASSESSMENT — PAIN DESCRIPTION - ONSET
ONSET: SUDDEN
ONSET: PROGRESSIVE

## 2025-07-16 ASSESSMENT — PAIN DESCRIPTION - PAIN TYPE: TYPE: SURGICAL PAIN

## 2025-07-16 ASSESSMENT — PAIN DESCRIPTION - DESCRIPTORS
DESCRIPTORS: SHARP;PRESSURE
DESCRIPTORS: ACHING;DISCOMFORT;DULL
DESCRIPTORS: SPASM

## 2025-07-16 ASSESSMENT — PAIN DESCRIPTION - FREQUENCY
FREQUENCY: INTERMITTENT
FREQUENCY: CONTINUOUS

## 2025-07-16 ASSESSMENT — PAIN DESCRIPTION - ORIENTATION
ORIENTATION: MID
ORIENTATION: RIGHT

## 2025-07-16 NOTE — PROGRESS NOTES
Spiritual Health History and Assessment/Progress Note  [facility]    Initial Encounter,  ,  ,      Name: Clau De La Vega MRN: [id number]    Age: 74 y.o.     Sex: female   Language: English   Sabianism: Alevism   GIB (gastrointestinal bleeding)     Date: 7/16/2025                           Spiritual Assessment began in [facility] INTERMEDIATE        Referral/Consult From: Rounding   Encounter Overview/Reason: Initial Encounter  Service Provided For: Patient    Mey, Belief, Meaning:   Patient is connected with a mey tradition or spiritual practice  Family/Friends No family/friends present      Importance and Influence:  Patient unable to assess at this time  Family/Friends No family/friends present    Community:  Patient feels well-supported. Support system includes: Children and Extended family  Family/Friends No family/friends present    Assessment and Plan of Care:     Patient Interventions include: Other: Patient at rest, did not disturb. Prayer offered for the patient.  Family/Friends Interventions include: No family/friends present    Patient Plan of Care: Spiritual Care available upon further referral  Family/Friends Plan of Care: No family/friends present    Electronically signed by Chaplain Janette on 7/16/2025 at 10:29 AM

## 2025-07-16 NOTE — PROGRESS NOTES
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    Past Surgical History:   Past Surgical History:   Procedure Laterality Date    CARDIOVASCULAR STRESS TEST  2003    normal    COLONOSCOPY  2006    Normal.  Re-referred for diagnostic on 9/10    COLONOSCOPY N/A 7/15/2025    COLONOSCOPY DIAGNOSTIC         ++LATEX ALLERGY++ performed by Rufina Epps MD at [facility] ENDOSCOPY    HYSTERECTOMY (CERVIX STATUS UNKNOWN)      IR ASP ABSCESS/HEMATOMA/BULLA/CYST  5/29/2025    IR ASP ABSCESS/HEMATOMA/BULLA/CYST 5/29/2025 Kirby Ko, PA [facility] SPECIAL PROCEDURES    SHOULDER SURGERY Left     SHOULDER SURGERY Right     TOOTH EXTRACTION Right October 2014    TUBAL LIGATION      1978    UPPER GASTROINTESTINAL ENDOSCOPY N/A 8/16/2023    EGD ESOPHAGOGASTRODUODENOSCOPY DILATATION performed by Estrella Ruiz MD at [facility] ENDOSCOPY    UPPER GASTROINTESTINAL ENDOSCOPY N/A 7/15/2025    ESOPHAGOGASTRODUODENOSCOPY performed by Rufina Epps MD at [facility] ENDOSCOPY       Social History:   reports that she has quit smoking. Her smoking use included cigarettes. She has a 20 pack-year smoking history. She has never used smokeless tobacco. She reports that she does not drink alcohol and does not use drugs.     Family History:   Family History

## 2025-07-16 NOTE — PROGRESS NOTES
Secure message sent to Dr. Angel, per Dr. Bourgeois Check with vascular for dosing for Eliquis since endoscopies were negative and surgery has okayed her restarting her anticoagulation. Message will be answered in the morning.

## 2025-07-16 NOTE — CARE COORDINATION
Social Work / Discharge Planning : SW noted [facility] responses to SNF choices. [facility] does NOT have a ST rehab bed. However, [facility] and [facility] both can accept patient. Patient updated and wants to decide which one she wants to go to. SW will follow up for final choice. WIll need to update [facility] and transport forms . Will need pre-cetrt. SW to follow. Electronically signed by NATASHA Mullen on 7/16/25 at 1:04 PM EDT

## 2025-07-16 NOTE — PLAN OF CARE
Problem: Pain  Goal: Verbalizes/displays adequate comfort level or baseline comfort level  7/15/2025 2117 by Kati Cabrera, RN  Outcome: Progressing

## 2025-07-16 NOTE — PROGRESS NOTES
Occupational Therapy    OCCUPATIONAL THERAPY INITIAL EVALUATION    [facility]   [address], [place]         Date:2025                                                  Patient Name: Clau De La Vega    MRN: [id number]    : 1950    Room: [address] [place] [address]      Evaluating OT: Mariana Erazo OTR/L   [id number]      Referring Provider:Alexa Bourgeois MD     Specific Provider Orders/Date:OT eval and treat 2025      Diagnosis:  GIB (gastrointestinal bleeding) [K92.2]  Gastrointestinal hemorrhage, unspecified gastrointestinal hemorrhage type [K92.2]  Acute on chronic anemia [D64.9]     Pertinent Medical History: total right hip arthroplasty in  and had consistent issues postoperatively.May 2025 antiobioitic spacer R hip,  sc. On  she went back to the OR for right hip revision washout and VAC placement and IntraOp cultures were positive for MSSA      chronic back pain, COPD, HTN, neuropathy, RA,     Precautions:  Fall Risk, TTWB R LE      Assessment of current deficits    [x] Functional mobility  [x]ADLs  [x] Strength               []Cognition    [x] Functional transfers   [x] IADLs         [x] Safety Awareness   [x]Endurance    [] Fine Coordination              [x] Balance      [] Vision/perception   [x]Sensation     []Gross Motor Coordination  [] ROM  [] Delirium                   [] Motor Control     OT PLAN OF CARE   OT POC based on physician orders, patient diagnosis and results of clinical assessment    Frequency/Duration  2-3 days/wk  PRN   Specific OT Treatment Interventions to include:   ADL retraining/adapted techniques and AE recommendations to increase functional independence within precautions                    Energy conservation techniques to improve tolerance for selfcare routine   Functional transfer/mobility training/DME recommendations for increased independence, safety and fall prevention         Patient/family education to

## 2025-07-16 NOTE — PROGRESS NOTES
GENERAL SURGERY  DAILY PROGRESS NOTE    Patient's Name/Date of Birth: Clau De La Vega / 1950    Date: 2025     Chief Complaint   Patient presents with    GI Problem     Per [facility] pt has had blood in her stools, emesis and nose bleeds since starting Heparin drip on 7/10 (due to blood clot in the right arm where previous midline was placed). Pt on IV ATB for right hip infection.          Subjective:  Denies further melena or hematochezia.        Objective:  Last 24Hrs  Temp  Av.2 °F (36.8 °C)  Min: 97.1 °F (36.2 °C)  Max: 99 °F (37.2 °C)  Resp  Av.6  Min: 12  Max: 20  Pulse  Av  Min: 78  Max: 89  Systolic (24hrs), Av , Min:112 , Max:145     Diastolic (24hrs), Av, Min:45, Max:64    SpO2  Av %  Min: 96 %  Max: 100 %    I/O last 3 completed shifts:  In: 200 [I.V.:200]  Out: -       General: In no acute distress  Cardiovascular: Warm throughout, no edema  Respiratory: no respiratory distress, equal chest rise  Abdomen: soft, nontender, nondistended  Skin: no obvious rashes or lesions appreciated  Extremities: atraumatic, no focal motor deficits      CBC  Recent Labs     25  0630 25  1213 07/15/25  0115 07/15/25  0520 25  0400   WBC 12.9*  --   --  11.6* 11.3   RBC 3.20*  --   --  3.04* 3.00*   HGB 9.2*   < > 8.6* 8.8* 8.5*   HCT 26.8*   < > 24.9* 25.3* 26.0*   MCV 83.8  --   --  83.2 86.7   MCH 28.8  --   --  28.9 28.3   MCHC 34.3  --   --  34.8* 32.7   RDW 17.6*  --   --  17.7* 18.0*     --   --  346 364   MPV 8.9  --   --  8.4 8.3    < > = values in this interval not displayed.       CMP  Recent Labs     25  0630 07/15/25  0520    136   K 4.1 3.4*    105   CO2 18* 19*   BUN 21 10   CREATININE 1.3* 0.9   GLUCOSE 57* 77   CALCIUM 8.1* 7.9*   BILITOT 0.6 0.3   ALKPHOS 112* 108*   AST 25 27   ALT 11 11         Assessment/Plan:  74 y.o. female  with reports of BRBPR and findings of melena in the setting of initiating anticoagulation

## 2025-07-16 NOTE — PROGRESS NOTES
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    Past Surgical History:   Past Surgical History:   Procedure Laterality Date    CARDIOVASCULAR STRESS TEST  2003    normal    COLONOSCOPY  2006    Normal.  Re-referred for diagnostic on 9/10    COLONOSCOPY N/A 7/15/2025    COLONOSCOPY DIAGNOSTIC         ++LATEX ALLERGY++ performed by Rufina Epps MD at [facility] ENDOSCOPY    HYSTERECTOMY (CERVIX STATUS UNKNOWN)      IR ASP ABSCESS/HEMATOMA/BULLA/CYST  5/29/2025    IR ASP ABSCESS/HEMATOMA/BULLA/CYST 5/29/2025 Kirby Ko, PA [facility] SPECIAL PROCEDURES    SHOULDER SURGERY Left     SHOULDER SURGERY Right     TOOTH EXTRACTION Right October 2014    TUBAL LIGATION      1978    UPPER GASTROINTESTINAL ENDOSCOPY N/A 8/16/2023    EGD ESOPHAGOGASTRODUODENOSCOPY DILATATION performed by Estrella Ruiz MD at [facility] ENDOSCOPY    UPPER GASTROINTESTINAL ENDOSCOPY N/A 7/15/2025    ESOPHAGOGASTRODUODENOSCOPY performed by Rufina Epps MD at [facility] ENDOSCOPY       Social History:   reports that she has quit smoking. Her smoking use included cigarettes. She has a 20 pack-year smoking history. She has never used smokeless tobacco. She reports that she does not drink alcohol and does not use drugs.     Family History:   Family History

## 2025-07-16 NOTE — PLAN OF CARE
Problem: Pain  Goal: Verbalizes/displays adequate comfort level or baseline comfort level  Outcome: Progressing     Problem: Skin/Tissue Integrity  Goal: Skin integrity remains intact  Description: 1.  Monitor for areas of redness and/or skin breakdown  2.  Assess vascular access sites hourly  3.  Every 4-6 hours minimum:  Change oxygen saturation probe site  4.  Every 4-6 hours:  If on nasal continuous positive airway pressure, respiratory therapy assess nares and determine need for appliance change or resting period  Outcome: Progressing  Flowsheets (Taken 7/16/2025 3315)  Skin Integrity Remains Intact: Monitor for areas of redness and/or skin breakdown     Problem: Safety - Adult  Goal: Free from fall injury  Outcome: Progressing     Problem: ABCDS Injury Assessment  Goal: Absence of physical injury  Outcome: Progressing     Problem: Nutrition Deficit:  Goal: Optimize nutritional status  Outcome: Progressing

## 2025-07-16 NOTE — PROGRESS NOTES
Physician Progress Note      PATIENT:               LAURA FLOR  [facility] #:                  [id number]  :                       1950  ADMIT DATE:       2025 4:00 AM  DISCH DATE:  RESPONDING  PROVIDER #:        Alexa Bourgeois MD          QUERY TEXT:    Based on your medical judgment, please clarify these findings and document if   any of the following are being evaluated and/or treated:    The clinical indicators include:  73 yo F, admitted for GIB. PMHx CKD stage III.    Creatinine 1.7 on admission . Creatinine 1.3 on . Creatinine 0.9 on   7/15 (labs).    Per IM progress note on  by Dr. Bourgeois, \"Creatinine hovering at 1.3 but   better than she was yesterday and CKD is her baseline.\"    Creatinine (orders).    1L NS bolus  (MAR).  Options provided:  -- Acute kidney injury  -- Other - I will add my own diagnosis  -- Disagree - Not applicable / Not valid  -- Disagree - Clinically unable to determine / Unknown  -- Refer to Clinical Documentation Reviewer    PROVIDER RESPONSE TEXT:    This patient has an Acute kidney injury.    Query created by: Bia Camarena on 7/15/2025 4:26 PM      QUERY TEXT:    Chronic anemia is documented in the medical record H&P  by Dr. Bourgeois.   Please specify the type:    The clinical indicators include:  73 yo F, admitted for GIB, anemia. PMHx MSSA septicemia on antibiotics, RUE   axillary + brachial vein DVT on anticoagulation.    Hgb 5.4, Hct 16.9 on admission  (labs).    Hgb 9.2, 9.5, 8.8 on . Hgb 8.6, 8.8 on 7/15 (labs).    Per ER Dr. Garcia , \"Pt had a PICC line placed for Abx and developed RUE   DVT for which she was started on an anticoagulant for. Per chart review it was   noted that she experienced some epistaxies and bloody stool. General Surgery   was consulted for concerns of GIB. Denies similar sx prior to being started on   heparin drip.\"    Per H&P Dr. Bourgeois , \"Her hemoglobin was stable as well as her   cardiovascular status

## 2025-07-16 NOTE — PROGRESS NOTES
Physical Therapy  Facility/Department: [address] INTERMEDIATE  Physical Therapy Initial Assessment    Name: Clau De La Vega  : 1950  MRN: [id number]  Date of Service: 2025      Patient Diagnosis(es): The primary encounter diagnosis was GIB (gastrointestinal bleeding). Diagnoses of Acute on chronic anemia and Gastrointestinal hemorrhage, unspecified gastrointestinal hemorrhage type were also pertinent to this visit.  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  Past Surgical History:  has a past surgical history that includes Colonoscopy (); Hysterectomy; cardiovascular stress test (); Tubal ligation; Tooth Extraction (Right, 2014); shoulder surgery (Left); shoulder surgery (Right); Upper gastrointestinal endoscopy (N/A, 2023); IR ASP ABSCESS/HEMATOMA/BULLA/CYST (2025); Upper gastrointestinal endoscopy (N/A, 7/15/2025); and Colonoscopy (N/A, 7/15/2025).      Evaluating Therapist: Silvia Meza PT      Room #:  0641/0641-A  Diagnosis:  GIB (gastrointestinal bleeding) [K92.2]  Gastrointestinal hemorrhage, unspecified gastrointestinal hemorrhage type [K92.2]  Acute on chronic anemia [D64.9]  PMHx/PSHx:  COPD, RA. R Hip surgery explant and spacer of right hip on 2025 R hip extensive I&D on 25   Precautions:  falls, TTWB R LE      Social:  Pt admitted from [facility]. Was

## 2025-07-17 PROBLEM — I82.A11 DVT OF RIGHT AXILLARY VEIN, ACUTE (HCC): Status: ACTIVE | Noted: 2025-07-17

## 2025-07-17 LAB
ALBUMIN SERPL-MCNC: 2.3 G/DL (ref 3.5–5.2)
ALP SERPL-CCNC: 117 U/L (ref 35–104)
ALT SERPL-CCNC: 9 U/L (ref 0–35)
ANION GAP SERPL CALCULATED.3IONS-SCNC: 11 MMOL/L (ref 7–16)
AST SERPL-CCNC: 22 U/L (ref 0–35)
BILIRUB SERPL-MCNC: 0.3 MG/DL (ref 0–1.2)
BUN SERPL-MCNC: 9 MG/DL (ref 8–23)
CALCIUM SERPL-MCNC: 8.5 MG/DL (ref 8.8–10.2)
CHLORIDE SERPL-SCNC: 107 MMOL/L (ref 98–107)
CO2 SERPL-SCNC: 18 MMOL/L (ref 22–29)
CREAT SERPL-MCNC: 0.7 MG/DL (ref 0.5–1)
ERYTHROCYTE [DISTWIDTH] IN BLOOD BY AUTOMATED COUNT: 18.4 % (ref 11.5–15)
GFR, ESTIMATED: >90 ML/MIN/1.73M2
GLUCOSE SERPL-MCNC: 74 MG/DL (ref 74–99)
HCT VFR BLD AUTO: 28.6 % (ref 34–48)
HGB BLD-MCNC: 9 G/DL (ref 11.5–15.5)
MCH RBC QN AUTO: 28.4 PG (ref 26–35)
MCHC RBC AUTO-ENTMCNC: 31.5 G/DL (ref 32–34.5)
MCV RBC AUTO: 90.2 FL (ref 80–99.9)
PLATELET # BLD AUTO: 422 K/UL (ref 130–450)
PMV BLD AUTO: 8.8 FL (ref 7–12)
POTASSIUM SERPL-SCNC: 4.1 MMOL/L (ref 3.5–5.1)
PROT SERPL-MCNC: 6.1 G/DL (ref 6.4–8.3)
RBC # BLD AUTO: 3.17 M/UL (ref 3.5–5.5)
SODIUM SERPL-SCNC: 136 MMOL/L (ref 136–145)
WBC OTHER # BLD: 13.3 K/UL (ref 4.5–11.5)

## 2025-07-17 PROCEDURE — 97530 THERAPEUTIC ACTIVITIES: CPT

## 2025-07-17 PROCEDURE — 2060000000 HC ICU INTERMEDIATE R&B

## 2025-07-17 PROCEDURE — 6370000000 HC RX 637 (ALT 250 FOR IP): Performed by: INTERNAL MEDICINE

## 2025-07-17 PROCEDURE — 85027 COMPLETE CBC AUTOMATED: CPT

## 2025-07-17 PROCEDURE — 99223 1ST HOSP IP/OBS HIGH 75: CPT | Performed by: STUDENT IN AN ORGANIZED HEALTH CARE EDUCATION/TRAINING PROGRAM

## 2025-07-17 PROCEDURE — 97535 SELF CARE MNGMENT TRAINING: CPT

## 2025-07-17 PROCEDURE — 80053 COMPREHEN METABOLIC PANEL: CPT

## 2025-07-17 PROCEDURE — 6370000000 HC RX 637 (ALT 250 FOR IP)

## 2025-07-17 RX ADMIN — CALCIUM CARBONATE-VITAMIN D TAB 500 MG-200 UNIT 1 TABLET: 500-200 TAB at 20:11

## 2025-07-17 RX ADMIN — SUCRALFATE 1 G: 1 TABLET ORAL at 10:02

## 2025-07-17 RX ADMIN — FLUCONAZOLE 400 MG: 150 TABLET ORAL at 07:57

## 2025-07-17 RX ADMIN — LEVOTHYROXINE SODIUM 50 MCG: 0.05 TABLET ORAL at 06:28

## 2025-07-17 RX ADMIN — GABAPENTIN 200 MG: 100 CAPSULE ORAL at 07:57

## 2025-07-17 RX ADMIN — POTASSIUM CHLORIDE 40 MEQ: 20 TABLET, EXTENDED RELEASE ORAL at 07:57

## 2025-07-17 RX ADMIN — OXYCODONE 5 MG: 5 TABLET ORAL at 20:09

## 2025-07-17 RX ADMIN — GABAPENTIN 200 MG: 100 CAPSULE ORAL at 14:37

## 2025-07-17 RX ADMIN — Medication 6 MG: at 21:47

## 2025-07-17 RX ADMIN — FOLIC ACID 1 MG: 1 TABLET ORAL at 07:57

## 2025-07-17 RX ADMIN — FAMOTIDINE 20 MG: 20 TABLET, FILM COATED ORAL at 20:09

## 2025-07-17 RX ADMIN — DULOXETINE 20 MG: 20 CAPSULE, DELAYED RELEASE ORAL at 07:57

## 2025-07-17 RX ADMIN — PANTOPRAZOLE SODIUM 40 MG: 40 TABLET, DELAYED RELEASE ORAL at 06:28

## 2025-07-17 RX ADMIN — SUCRALFATE 1 G: 1 TABLET ORAL at 20:11

## 2025-07-17 RX ADMIN — CALCIUM CARBONATE-VITAMIN D TAB 500 MG-200 UNIT 1 TABLET: 500-200 TAB at 07:57

## 2025-07-17 RX ADMIN — I-VITE, TAB 1000-60-2MG (60/BT) 1 TABLET: TAB at 07:57

## 2025-07-17 RX ADMIN — POTASSIUM CHLORIDE 40 MEQ: 20 TABLET, EXTENDED RELEASE ORAL at 17:38

## 2025-07-17 RX ADMIN — ASPIRIN 81 MG: 81 TABLET, COATED ORAL at 07:57

## 2025-07-17 RX ADMIN — AMLODIPINE BESYLATE 10 MG: 10 TABLET ORAL at 07:56

## 2025-07-17 RX ADMIN — GABAPENTIN 200 MG: 100 CAPSULE ORAL at 20:11

## 2025-07-17 RX ADMIN — OXYCODONE 5 MG: 5 TABLET ORAL at 10:01

## 2025-07-17 RX ADMIN — PANTOPRAZOLE SODIUM 40 MG: 40 TABLET, DELAYED RELEASE ORAL at 14:37

## 2025-07-17 RX ADMIN — SUCRALFATE 1 G: 1 TABLET ORAL at 17:38

## 2025-07-17 RX ADMIN — SUCRALFATE 1 G: 1 TABLET ORAL at 06:28

## 2025-07-17 RX ADMIN — FERROUS SULFATE TAB 325 MG (65 MG ELEMENTAL FE) 325 MG: 325 (65 FE) TAB at 08:00

## 2025-07-17 RX ADMIN — ASPIRIN 81 MG: 81 TABLET, COATED ORAL at 20:11

## 2025-07-17 ASSESSMENT — PAIN SCALES - GENERAL
PAINLEVEL_OUTOF10: 8
PAINLEVEL_OUTOF10: 6
PAINLEVEL_OUTOF10: 8

## 2025-07-17 ASSESSMENT — PAIN DESCRIPTION - ORIENTATION
ORIENTATION: LOWER
ORIENTATION: RIGHT;UPPER;LOWER

## 2025-07-17 ASSESSMENT — PAIN DESCRIPTION - DESCRIPTORS
DESCRIPTORS: ACHING;STABBING
DESCRIPTORS: ACHING;DISCOMFORT

## 2025-07-17 ASSESSMENT — PAIN DESCRIPTION - LOCATION
LOCATION: LEG
LOCATION: BACK

## 2025-07-17 NOTE — PROGRESS NOTES
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    Past Surgical History:   Past Surgical History:   Procedure Laterality Date    CARDIOVASCULAR STRESS TEST  2003    normal    COLONOSCOPY  2006    Normal.  Re-referred for diagnostic on 9/10    COLONOSCOPY N/A 7/15/2025    COLONOSCOPY DIAGNOSTIC         ++LATEX ALLERGY++ performed by Rufina Epps MD at [facility] ENDOSCOPY    HYSTERECTOMY (CERVIX STATUS UNKNOWN)      IR ASP ABSCESS/HEMATOMA/BULLA/CYST  5/29/2025    IR ASP ABSCESS/HEMATOMA/BULLA/CYST 5/29/2025 Kirby Ko, PA [facility] SPECIAL PROCEDURES    SHOULDER SURGERY Left     SHOULDER SURGERY Right     TOOTH EXTRACTION Right October 2014    TUBAL LIGATION      1978    UPPER GASTROINTESTINAL ENDOSCOPY N/A 8/16/2023    EGD ESOPHAGOGASTRODUODENOSCOPY DILATATION performed by Estrella Ruiz MD at [facility] ENDOSCOPY    UPPER GASTROINTESTINAL ENDOSCOPY N/A 7/15/2025    ESOPHAGOGASTRODUODENOSCOPY performed by Rufina Epps MD at [facility] ENDOSCOPY       Social History:   reports that she has quit smoking. Her smoking use included cigarettes. She has a 20 pack-year smoking history. She has never used smokeless tobacco. She reports that she does not drink alcohol and does not use drugs.     Family History:   Family History

## 2025-07-17 NOTE — PROGRESS NOTES
Secure message sent to Dr. Angel, Vascular, for anticoagulation orders for discharge, awaiting discharge orders for eliquis.

## 2025-07-17 NOTE — PLAN OF CARE
Problem: Pain  Goal: Verbalizes/displays adequate comfort level or baseline comfort level  7/17/2025 0959 by Alessia Hill, RN  Outcome: Progressing  7/16/2025 2158 by Kirk Landin, RN  Outcome: Progressing

## 2025-07-17 NOTE — PROGRESS NOTES
Occupational Therapy  OT BEDSIDE TREATMENT NOTE      Date:2025  Patient Name: Clau De La Vega  MRN: [id number]  : 1950  Room: [address] [place], [place] [address]     Evaluating OT: Mariana Erazo OTR/L   [id number]       Referring Provider:Alexa Bourgeois MD     Specific Provider Orders/Date:OT eval and treat 2025       Diagnosis:  GIB (gastrointestinal bleeding) [K92.2]  Gastrointestinal hemorrhage, unspecified gastrointestinal hemorrhage type [K92.2]  Acute on chronic anemia [D64.9]     Pertinent Medical History: total right hip arthroplasty in  and had consistent issues postoperatively.May 2025 antiobioitic spacer R hip,  sc. On  she went back to the OR for right hip revision washout and VAC placement and IntraOp cultures were positive for MSSA      chronic back pain, COPD, HTN, neuropathy, RA,     Precautions:  Fall Risk, TTWB R LE       Assessment of current deficits    [x] Functional mobility            [x]ADLs           [x] Strength                  []Cognition    [x] Functional transfers          [x] IADLs         [x] Safety Awareness   [x]Endurance    [] Fine Coordination                         [x] Balance      [] Vision/perception   [x]Sensation      []Gross Motor Coordination             [] ROM           [] Delirium                   [] Motor Control      OT PLAN OF CARE   OT POC based on physician orders, patient diagnosis and results of clinical assessment     Frequency/Duration  2-3 days/wk  PRN   Specific OT Treatment Interventions to include:   ADL retraining/adapted techniques and AE recommendations to increase functional independence within precautions                    Energy conservation techniques to improve tolerance for selfcare routine   Functional transfer/mobility training/DME recommendations for increased independence, safety and fall prevention         Patient/family education to increase safety and functional independence             Environmental modifications for safe mobility and

## 2025-07-17 NOTE — PROGRESS NOTES
Physical Therapy  Facility/Department: [address] INTERMEDIATE  Physical Therapy Treatment Note    Name: Clau De La Vega  : 1950  MRN: [id number]  Date of Service: 2025      Patient Diagnosis(es): The primary encounter diagnosis was GIB (gastrointestinal bleeding). Diagnoses of Acute on chronic anemia and Gastrointestinal hemorrhage, unspecified gastrointestinal hemorrhage type were also pertinent to this visit.  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  Past Surgical History:  has a past surgical history that includes Colonoscopy (); Hysterectomy; cardiovascular stress test (); Tubal ligation; Tooth Extraction (Right, 2014); shoulder surgery (Left); shoulder surgery (Right); Upper gastrointestinal endoscopy (N/A, 2023); IR ASP ABSCESS/HEMATOMA/BULLA/CYST (2025); Upper gastrointestinal endoscopy (N/A, 7/15/2025); and Colonoscopy (N/A, 7/15/2025).      Evaluating Therapist: Silvia Meza PT      Room #:  0641/0641-A  Diagnosis:  GIB (gastrointestinal bleeding) [K92.2]  Gastrointestinal hemorrhage, unspecified gastrointestinal hemorrhage type [K92.2]  Acute on chronic anemia [D64.9]  PMHx/PSHx:  COPD, RA. R Hip surgery explant and spacer of right hip on 2025 R hip extensive I&D on 25   Precautions:  falls, TTWB R LE      Social:  Pt admitted from [facility]. Was transferring

## 2025-07-17 NOTE — CARE COORDINATION
Social Work / Discharge Planning : SW followed up with patient this am and she would like [facility]. [facility] updated via [employer] to submit pre-cert. Await Auth. Leana Duarte notified. Paperwork updated to reflect [facility] . AWait AuthPushpa SHERMAN to follow. Electronically signed by NATASHA Mullen on 7/17/25 at 9:52 AM EDT

## 2025-07-18 PROCEDURE — 2060000000 HC ICU INTERMEDIATE R&B

## 2025-07-18 PROCEDURE — 6370000000 HC RX 637 (ALT 250 FOR IP): Performed by: INTERNAL MEDICINE

## 2025-07-18 PROCEDURE — 6370000000 HC RX 637 (ALT 250 FOR IP)

## 2025-07-18 RX ORDER — SUCRALFATE 1 G/1
1 TABLET ORAL
Qty: 120 TABLET | Refills: 3 | Status: SHIPPED | OUTPATIENT
Start: 2025-07-18

## 2025-07-18 RX ORDER — PANTOPRAZOLE SODIUM 40 MG/1
40 TABLET, DELAYED RELEASE ORAL
Qty: 30 TABLET | Refills: 3 | Status: SHIPPED | OUTPATIENT
Start: 2025-07-18

## 2025-07-18 RX ADMIN — DULOXETINE 20 MG: 20 CAPSULE, DELAYED RELEASE ORAL at 08:13

## 2025-07-18 RX ADMIN — FLUCONAZOLE 400 MG: 150 TABLET ORAL at 08:14

## 2025-07-18 RX ADMIN — GABAPENTIN 200 MG: 100 CAPSULE ORAL at 20:30

## 2025-07-18 RX ADMIN — LEVOTHYROXINE SODIUM 50 MCG: 0.05 TABLET ORAL at 05:32

## 2025-07-18 RX ADMIN — SUCRALFATE 1 G: 1 TABLET ORAL at 15:34

## 2025-07-18 RX ADMIN — PANTOPRAZOLE SODIUM 40 MG: 40 TABLET, DELAYED RELEASE ORAL at 05:32

## 2025-07-18 RX ADMIN — POTASSIUM CHLORIDE 40 MEQ: 20 TABLET, EXTENDED RELEASE ORAL at 08:13

## 2025-07-18 RX ADMIN — AMLODIPINE BESYLATE 10 MG: 10 TABLET ORAL at 08:13

## 2025-07-18 RX ADMIN — OXYCODONE 5 MG: 5 TABLET ORAL at 09:23

## 2025-07-18 RX ADMIN — OXYCODONE 5 MG: 5 TABLET ORAL at 03:47

## 2025-07-18 RX ADMIN — APIXABAN 2.5 MG: 2.5 TABLET, FILM COATED ORAL at 20:30

## 2025-07-18 RX ADMIN — OXYCODONE 5 MG: 5 TABLET ORAL at 15:36

## 2025-07-18 RX ADMIN — CALCIUM CARBONATE-VITAMIN D TAB 500 MG-200 UNIT 1 TABLET: 500-200 TAB at 08:13

## 2025-07-18 RX ADMIN — I-VITE, TAB 1000-60-2MG (60/BT) 1 TABLET: TAB at 08:13

## 2025-07-18 RX ADMIN — FAMOTIDINE 20 MG: 20 TABLET, FILM COATED ORAL at 20:30

## 2025-07-18 RX ADMIN — PANTOPRAZOLE SODIUM 40 MG: 40 TABLET, DELAYED RELEASE ORAL at 15:34

## 2025-07-18 RX ADMIN — SUCRALFATE 1 G: 1 TABLET ORAL at 05:32

## 2025-07-18 RX ADMIN — SUCRALFATE 1 G: 1 TABLET ORAL at 09:23

## 2025-07-18 RX ADMIN — FOLIC ACID 1 MG: 1 TABLET ORAL at 08:13

## 2025-07-18 RX ADMIN — SUCRALFATE 1 G: 1 TABLET ORAL at 20:30

## 2025-07-18 RX ADMIN — POTASSIUM CHLORIDE 40 MEQ: 20 TABLET, EXTENDED RELEASE ORAL at 15:34

## 2025-07-18 RX ADMIN — APIXABAN 2.5 MG: 2.5 TABLET, FILM COATED ORAL at 08:13

## 2025-07-18 RX ADMIN — GABAPENTIN 200 MG: 100 CAPSULE ORAL at 15:34

## 2025-07-18 RX ADMIN — CALCIUM CARBONATE-VITAMIN D TAB 500 MG-200 UNIT 1 TABLET: 500-200 TAB at 20:30

## 2025-07-18 RX ADMIN — Medication 6 MG: at 20:37

## 2025-07-18 RX ADMIN — GABAPENTIN 200 MG: 100 CAPSULE ORAL at 08:13

## 2025-07-18 ASSESSMENT — PAIN DESCRIPTION - LOCATION
LOCATION: BACK;HIP
LOCATION: BACK

## 2025-07-18 ASSESSMENT — PAIN SCALES - GENERAL
PAINLEVEL_OUTOF10: 5
PAINLEVEL_OUTOF10: 5
PAINLEVEL_OUTOF10: 8
PAINLEVEL_OUTOF10: 8
PAINLEVEL_OUTOF10: 5

## 2025-07-18 ASSESSMENT — PAIN DESCRIPTION - ORIENTATION
ORIENTATION: LOWER
ORIENTATION: RIGHT

## 2025-07-18 ASSESSMENT — PAIN DESCRIPTION - DESCRIPTORS
DESCRIPTORS: ACHING
DESCRIPTORS: ACHING;PRESSURE

## 2025-07-18 ASSESSMENT — PAIN - FUNCTIONAL ASSESSMENT: PAIN_FUNCTIONAL_ASSESSMENT: ACTIVITIES ARE NOT PREVENTED

## 2025-07-18 NOTE — CARE COORDINATION
Social Work / Discharge Planning : Patient plan is [facility]. Pre-cert started and still pending. Await Authorization. SW to follow. Electronically signed by NATASHA Mullen on 7/18/25 at 1:31 PM EDT

## 2025-07-18 NOTE — PLAN OF CARE
Problem: Skin/Tissue Integrity  Goal: Skin integrity remains intact  Description: 1.  Monitor for areas of redness and/or skin breakdown  2.  Assess vascular access sites hourly  3.  Every 4-6 hours minimum:  Change oxygen saturation probe site  4.  Every 4-6 hours:  If on nasal continuous positive airway pressure, respiratory therapy assess nares and determine need for appliance change or resting period  Recent Flowsheet Documentation  Taken 7/18/2025 0691 by Tej Edwards RN  Skin Integrity Remains Intact: Monitor for areas of redness and/or skin breakdown

## 2025-07-18 NOTE — DISCHARGE SUMMARY
Internal Medicine Discharge Summary    Patient ID: Clau De La Vega      Patient's PCP: Kelly Shaw MD    Admit Date: 7/13/2025     Discharge Date:   7 19 25    Admitting Physician: Alexa Bourgeois MD     Discharge Physician: Alexa Bourgeois MD     Discharge Diagnoses:       Active Hospital Problems    Diagnosis Date Noted    DVT of right axillary vein, acute (HCC) [I82.A11] 07/17/2025    Moderate protein-calorie malnutrition [E44.0] 07/15/2025    Melena [K92.1] 07/14/2025    GIB (gastrointestinal bleeding) [K92.2] 07/13/2025       The patient was seen and examined on day of discharge and this discharge summary is in conjunction with any daily progress note from day of discharge.    Hospital Course:     has a past medical history of Acid reflux, Analgesic rebound headache, Chronic anemia, Chronic back pain, Constipation, COPD (chronic obstructive pulmonary disease) (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    Patient had both upper and lower scopes done by general surgery which failed to reveal any acute sites of bleeding however she did require blood product support and eventually her hemoglobin stayed closer to 9  She is now done with her antibiotics and she will be discharging to skilled facility of choice as long as precertification is in place  Acute kidney injury did resolve  Vascular services did see her because we were able to confirm the right upper extremity DVT for which she had started anticoagulation and at this point he has okayed her anticoagulation for low-dose at 2.5

## 2025-07-18 NOTE — PROGRESS NOTES
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    Past Surgical History:   Past Surgical History:   Procedure Laterality Date    CARDIOVASCULAR STRESS TEST  2003    normal    COLONOSCOPY  2006    Normal.  Re-referred for diagnostic on 9/10    COLONOSCOPY N/A 7/15/2025    COLONOSCOPY DIAGNOSTIC         ++LATEX ALLERGY++ performed by Rufina Epps MD at [facility] ENDOSCOPY    HYSTERECTOMY (CERVIX STATUS UNKNOWN)      IR ASP ABSCESS/HEMATOMA/BULLA/CYST  5/29/2025    IR ASP ABSCESS/HEMATOMA/BULLA/CYST 5/29/2025 Kirby Ko, PA [facility] SPECIAL PROCEDURES    SHOULDER SURGERY Left     SHOULDER SURGERY Right     TOOTH EXTRACTION Right October 2014    TUBAL LIGATION      1978    UPPER GASTROINTESTINAL ENDOSCOPY N/A 8/16/2023    EGD ESOPHAGOGASTRODUODENOSCOPY DILATATION performed by Estrella Ruiz MD at [facility] ENDOSCOPY    UPPER GASTROINTESTINAL ENDOSCOPY N/A 7/15/2025    ESOPHAGOGASTRODUODENOSCOPY performed by Rufina Epps MD at [facility] ENDOSCOPY       Social History:   reports that she has quit smoking. Her smoking use included cigarettes. She has a 20 pack-year smoking history. She has never used smokeless tobacco. She reports that she does not drink alcohol and does not use drugs.     Family History:   Family History

## 2025-07-19 LAB
ERYTHROCYTE [DISTWIDTH] IN BLOOD BY AUTOMATED COUNT: 18.7 % (ref 11.5–15)
HCT VFR BLD AUTO: 31.2 % (ref 34–48)
HGB BLD-MCNC: 10.1 G/DL (ref 11.5–15.5)
MCH RBC QN AUTO: 28.7 PG (ref 26–35)
MCHC RBC AUTO-ENTMCNC: 32.4 G/DL (ref 32–34.5)
MCV RBC AUTO: 88.6 FL (ref 80–99.9)
PLATELET # BLD AUTO: 582 K/UL (ref 130–450)
PMV BLD AUTO: 8.4 FL (ref 7–12)
RBC # BLD AUTO: 3.52 M/UL (ref 3.5–5.5)
WBC OTHER # BLD: 21.3 K/UL (ref 4.5–11.5)

## 2025-07-19 PROCEDURE — 6370000000 HC RX 637 (ALT 250 FOR IP): Performed by: INTERNAL MEDICINE

## 2025-07-19 PROCEDURE — 6370000000 HC RX 637 (ALT 250 FOR IP)

## 2025-07-19 PROCEDURE — 85027 COMPLETE CBC AUTOMATED: CPT

## 2025-07-19 PROCEDURE — 2060000000 HC ICU INTERMEDIATE R&B

## 2025-07-19 RX ADMIN — SUCRALFATE 1 G: 1 TABLET ORAL at 21:05

## 2025-07-19 RX ADMIN — DULOXETINE 20 MG: 20 CAPSULE, DELAYED RELEASE ORAL at 08:55

## 2025-07-19 RX ADMIN — METHOCARBAMOL TABLETS 500 MG: 500 TABLET, COATED ORAL at 05:31

## 2025-07-19 RX ADMIN — OXYCODONE 5 MG: 5 TABLET ORAL at 11:00

## 2025-07-19 RX ADMIN — GABAPENTIN 200 MG: 100 CAPSULE ORAL at 14:04

## 2025-07-19 RX ADMIN — Medication 6 MG: at 23:18

## 2025-07-19 RX ADMIN — OXYCODONE 5 MG: 5 TABLET ORAL at 02:32

## 2025-07-19 RX ADMIN — APIXABAN 2.5 MG: 2.5 TABLET, FILM COATED ORAL at 21:06

## 2025-07-19 RX ADMIN — FOLIC ACID 1 MG: 1 TABLET ORAL at 08:55

## 2025-07-19 RX ADMIN — APIXABAN 2.5 MG: 2.5 TABLET, FILM COATED ORAL at 08:55

## 2025-07-19 RX ADMIN — FERROUS SULFATE TAB 325 MG (65 MG ELEMENTAL FE) 325 MG: 325 (65 FE) TAB at 08:58

## 2025-07-19 RX ADMIN — SUCRALFATE 1 G: 1 TABLET ORAL at 05:30

## 2025-07-19 RX ADMIN — FLUCONAZOLE 400 MG: 150 TABLET ORAL at 08:55

## 2025-07-19 RX ADMIN — PANTOPRAZOLE SODIUM 40 MG: 40 TABLET, DELAYED RELEASE ORAL at 15:46

## 2025-07-19 RX ADMIN — GABAPENTIN 200 MG: 100 CAPSULE ORAL at 08:55

## 2025-07-19 RX ADMIN — OXYCODONE 5 MG: 5 TABLET ORAL at 18:22

## 2025-07-19 RX ADMIN — SUCRALFATE 1 G: 1 TABLET ORAL at 11:00

## 2025-07-19 RX ADMIN — OXYCODONE 5 MG: 5 TABLET ORAL at 23:18

## 2025-07-19 RX ADMIN — FAMOTIDINE 20 MG: 20 TABLET, FILM COATED ORAL at 21:06

## 2025-07-19 RX ADMIN — AMLODIPINE BESYLATE 10 MG: 10 TABLET ORAL at 08:55

## 2025-07-19 RX ADMIN — LEVOTHYROXINE SODIUM 50 MCG: 0.05 TABLET ORAL at 05:30

## 2025-07-19 RX ADMIN — CALCIUM CARBONATE-VITAMIN D TAB 500 MG-200 UNIT 1 TABLET: 500-200 TAB at 21:05

## 2025-07-19 RX ADMIN — GABAPENTIN 200 MG: 100 CAPSULE ORAL at 21:06

## 2025-07-19 RX ADMIN — SUCRALFATE 1 G: 1 TABLET ORAL at 15:46

## 2025-07-19 RX ADMIN — CALCIUM CARBONATE-VITAMIN D TAB 500 MG-200 UNIT 1 TABLET: 500-200 TAB at 08:55

## 2025-07-19 RX ADMIN — PANTOPRAZOLE SODIUM 40 MG: 40 TABLET, DELAYED RELEASE ORAL at 05:30

## 2025-07-19 RX ADMIN — I-VITE, TAB 1000-60-2MG (60/BT) 1 TABLET: TAB at 08:55

## 2025-07-19 ASSESSMENT — PAIN - FUNCTIONAL ASSESSMENT
PAIN_FUNCTIONAL_ASSESSMENT: PREVENTS OR INTERFERES SOME ACTIVE ACTIVITIES AND ADLS

## 2025-07-19 ASSESSMENT — PAIN DESCRIPTION - LOCATION
LOCATION: BACK

## 2025-07-19 ASSESSMENT — PAIN SCALES - GENERAL
PAINLEVEL_OUTOF10: 8
PAINLEVEL_OUTOF10: 7
PAINLEVEL_OUTOF10: 8

## 2025-07-19 ASSESSMENT — PAIN DESCRIPTION - DESCRIPTORS
DESCRIPTORS: ACHING;THROBBING;SHARP
DESCRIPTORS: ACHING
DESCRIPTORS: DISCOMFORT
DESCRIPTORS: ACHING

## 2025-07-19 ASSESSMENT — PAIN DESCRIPTION - ORIENTATION
ORIENTATION: LOWER
ORIENTATION: LEFT;RIGHT;LOWER

## 2025-07-19 NOTE — PROGRESS NOTES
Internal Medicine Progress Note      Synopsis: Patient admittMs. Clau De La Vega, a 74 y.o. 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         This is a patient of 74 years of age she has a previous history of hyperlipidemia and osteoarthritis and also was on methotrexate for rheumatoid arthritis.  She had a total right hip arthroplasty in 2023 and had consistent issues postoperatively.  She eventually could not walk and came back to her surgeon for further care she was admitted to [place] and diagnosed with bacteremia of methicillin sensitive Staph aureus.  She did have aspiration on May 27 and fluid was expressed and described as purulent in the cell count was not possible with the specimen however because of her positive blood cultures she had a workup with infectious disease.  There was erosive changes on imaging of the right hip of the femoral implant at the fracture of the lesser trochanter with lucency around the bone.  She was admitted and was given Teflaro from 6/4/2025 till 6/6/2025 and then went back on oxacillin.  On May 31, 2025 she had an explant with articulating antibiotic and she had operative cultures at that point that were positive for Staph aureus and eventually repeat scanning showed fluid accumulation with persistent Staph aureus.  On 6 June she went back to the OR for right hip  lateral right thigh measuring 5.0 x 2.8 x 11.9 cm. Soft tissue nodules in the right breast measuring 1.2 cm.  Correlate with recent mammogram 05/29/2025.        ASSESSMENT:    Principal Problem:    GIB (gastrointestinal bleeding)  Active Problems:    Melena    Moderate protein-calorie malnutrition    DVT of right axillary vein, acute (HCC)  Resolved Problems:    * No resolved hospital problems. *       PLAN:    She is still stable cardiovascularly  Hemoglobin is stable at 7.1  Electrolytes are okay for now  Mild acidosis  Creatinine hovering at 1.3 but better than she was yesterday and CKD is her baseline  Awaiting scopes tomorrow both upper and lower.  Maintaining her treatment for MSSA septicemia with right hip prosthetic joint infection and she is finishing her nafcillin tomorrow  No anticoagulation for right upper extremity DVT.  [facility] from [facility] still now did show the same clot and vascular on board  July 15  Awaiting her scopes today  Resumption of anticoagulation only after scopes are clear and if okay with surgery  Finishing her previous antibiotics  Otherwise not in any distress  No other changes in her hypertension management and her acute kidney injury has improved   She does have baseline chronic kidney disease  July 16  She appears okay today  She will be finishing antibiotics by tomorrow and she will be discharged to skilled facility  Hemoglobin stable  Anticoagulate for the right upper extremity DVT recommendations awaited.  Vascular surgeon messaged  Aggressive replacement of potassium likely losses due to GI cocktail for her to have a colonoscopy.  Noted the colonoscopy prep was not complete and Dr. Ellsworth recommended repeat in 5 years if needed  July 17  Patient is looking well today  She is finishing her antibiotics.  Awaiting vascular for giving us recommendations on her right upper extremity DVT.  I did resume her aspirin which was being used for DVT prophylaxis as per orthopedic

## 2025-07-19 NOTE — PLAN OF CARE
Problem: Pain  Goal: Verbalizes/displays adequate comfort level or baseline comfort level  Outcome: Progressing     Problem: Skin/Tissue Integrity  Goal: Skin integrity remains intact  Description: 1.  Monitor for areas of redness and/or skin breakdown  2.  Assess vascular access sites hourly  3.  Every 4-6 hours minimum:  Change oxygen saturation probe site  4.  Every 4-6 hours:  If on nasal continuous positive airway pressure, respiratory therapy assess nares and determine need for appliance change or resting period  Outcome: Progressing     Problem: Safety - Adult  Goal: Free from fall injury  Outcome: Progressing     Problem: ABCDS Injury Assessment  Goal: Absence of physical injury  Outcome: Progressing     Problem: Nutrition Deficit:  Goal: Optimize nutritional status  Outcome: Progressing

## 2025-07-20 ENCOUNTER — APPOINTMENT (OUTPATIENT)
Dept: CT IMAGING | Age: 75
DRG: 254 | End: 2025-07-20
Payer: COMMERCIAL

## 2025-07-20 ENCOUNTER — APPOINTMENT (OUTPATIENT)
Dept: GENERAL RADIOLOGY | Age: 75
DRG: 254 | End: 2025-07-20
Payer: COMMERCIAL

## 2025-07-20 LAB
ALBUMIN SERPL-MCNC: 2.8 G/DL (ref 3.5–5.2)
ALP SERPL-CCNC: 136 U/L (ref 35–104)
ALT SERPL-CCNC: 7 U/L (ref 0–35)
ANION GAP SERPL CALCULATED.3IONS-SCNC: 11 MMOL/L (ref 7–16)
ANION GAP SERPL CALCULATED.3IONS-SCNC: 14 MMOL/L (ref 7–16)
AST SERPL-CCNC: 21 U/L (ref 0–35)
BILIRUB SERPL-MCNC: 0.4 MG/DL (ref 0–1.2)
BILIRUB UR QL STRIP: NEGATIVE
BUN SERPL-MCNC: 13 MG/DL (ref 8–23)
BUN SERPL-MCNC: 14 MG/DL (ref 8–23)
CALCIUM SERPL-MCNC: 9.1 MG/DL (ref 8.8–10.2)
CALCIUM SERPL-MCNC: 9.9 MG/DL (ref 8.8–10.2)
CHLORIDE SERPL-SCNC: 101 MMOL/L (ref 98–107)
CHLORIDE SERPL-SCNC: 99 MMOL/L (ref 98–107)
CHLORIDE UR-SCNC: 77 MMOL/L
CLARITY UR: CLEAR
CO2 SERPL-SCNC: 14 MMOL/L (ref 22–29)
CO2 SERPL-SCNC: 16 MMOL/L (ref 22–29)
COLOR UR: YELLOW
CREAT SERPL-MCNC: 1 MG/DL (ref 0.5–1)
CREAT SERPL-MCNC: 1.1 MG/DL (ref 0.5–1)
CREAT UR-MCNC: 30.4 MG/DL (ref 29–226)
CREAT UR-MCNC: 30.4 MG/DL (ref 29–226)
CREAT UR-MCNC: 30.8 MG/DL (ref 29–226)
EPI CELLS #/AREA URNS HPF: ABNORMAL /HPF
ERYTHROCYTE [DISTWIDTH] IN BLOOD BY AUTOMATED COUNT: 18.2 % (ref 11.5–15)
GFR, ESTIMATED: 56 ML/MIN/1.73M2
GFR, ESTIMATED: 59 ML/MIN/1.73M2
GLUCOSE SERPL-MCNC: 102 MG/DL (ref 74–99)
GLUCOSE SERPL-MCNC: 117 MG/DL (ref 74–99)
GLUCOSE UR STRIP-MCNC: NEGATIVE MG/DL
HCT VFR BLD AUTO: 31 % (ref 34–48)
HGB BLD-MCNC: 9.7 G/DL (ref 11.5–15.5)
HGB UR QL STRIP.AUTO: ABNORMAL
KETONES UR STRIP-MCNC: NEGATIVE MG/DL
LEUKOCYTE ESTERASE UR QL STRIP: NEGATIVE
MAGNESIUM SERPL-MCNC: 1.6 MG/DL (ref 1.6–2.4)
MCH RBC QN AUTO: 27.9 PG (ref 26–35)
MCHC RBC AUTO-ENTMCNC: 31.3 G/DL (ref 32–34.5)
MCV RBC AUTO: 89.1 FL (ref 80–99.9)
MICROALBUMIN UR-MCNC: 15 MG/L (ref 0–20)
MICROALBUMIN/CREAT UR-RTO: 49 MCG/MG CREAT (ref 0–30)
NITRITE UR QL STRIP: NEGATIVE
OSMOLALITY UR: 304 MOSM/KG (ref 300–900)
PH UR STRIP: 6.5 [PH] (ref 5–8)
PHOSPHATE SERPL-MCNC: 3.3 MG/DL (ref 2.5–4.5)
PLATELET # BLD AUTO: 698 K/UL (ref 130–450)
PMV BLD AUTO: 8.6 FL (ref 7–12)
POTASSIUM SERPL-SCNC: 5 MMOL/L (ref 3.5–5.1)
POTASSIUM SERPL-SCNC: 5.2 MMOL/L (ref 3.5–5.1)
POTASSIUM, UR: 37.5 MMOL/L
PROT SERPL-MCNC: 8 G/DL (ref 6.4–8.3)
PROT UR STRIP-MCNC: NEGATIVE MG/DL
RBC # BLD AUTO: 3.48 M/UL (ref 3.5–5.5)
RBC #/AREA URNS HPF: ABNORMAL /HPF
SODIUM SERPL-SCNC: 126 MMOL/L (ref 136–145)
SODIUM SERPL-SCNC: 129 MMOL/L (ref 136–145)
SODIUM UR-SCNC: 61 MMOL/L
SP GR UR STRIP: <1.005 (ref 1–1.03)
T4 FREE SERPL-MCNC: 0.9 NG/DL (ref 0.9–1.7)
TOTAL PROTEIN, URINE: 12 MG/DL (ref 0–12)
TSH SERPL DL<=0.05 MIU/L-ACNC: 3.09 UIU/ML (ref 0.27–4.2)
URATE SERPL-MCNC: 5.5 MG/DL (ref 2.4–5.7)
URINE TOTAL PROTEIN CREATININE RATIO: 0.39 (ref 0–0.2)
UROBILINOGEN UR STRIP-ACNC: 0.2 EU/DL (ref 0–1)
WBC #/AREA URNS HPF: ABNORMAL /HPF
WBC OTHER # BLD: 19.7 K/UL (ref 4.5–11.5)

## 2025-07-20 PROCEDURE — 83935 ASSAY OF URINE OSMOLALITY: CPT

## 2025-07-20 PROCEDURE — 84133 ASSAY OF URINE POTASSIUM: CPT

## 2025-07-20 PROCEDURE — 81001 URINALYSIS AUTO W/SCOPE: CPT

## 2025-07-20 PROCEDURE — 84439 ASSAY OF FREE THYROXINE: CPT

## 2025-07-20 PROCEDURE — 84300 ASSAY OF URINE SODIUM: CPT

## 2025-07-20 PROCEDURE — 80053 COMPREHEN METABOLIC PANEL: CPT

## 2025-07-20 PROCEDURE — 82436 ASSAY OF URINE CHLORIDE: CPT

## 2025-07-20 PROCEDURE — 84100 ASSAY OF PHOSPHORUS: CPT

## 2025-07-20 PROCEDURE — 85027 COMPLETE CBC AUTOMATED: CPT

## 2025-07-20 PROCEDURE — 2060000000 HC ICU INTERMEDIATE R&B

## 2025-07-20 PROCEDURE — 84156 ASSAY OF PROTEIN URINE: CPT

## 2025-07-20 PROCEDURE — 6370000000 HC RX 637 (ALT 250 FOR IP): Performed by: STUDENT IN AN ORGANIZED HEALTH CARE EDUCATION/TRAINING PROGRAM

## 2025-07-20 PROCEDURE — 80048 BASIC METABOLIC PNL TOTAL CA: CPT

## 2025-07-20 PROCEDURE — 73702 CT LWR EXTREMITY W/O&W/DYE: CPT

## 2025-07-20 PROCEDURE — 71045 X-RAY EXAM CHEST 1 VIEW: CPT

## 2025-07-20 PROCEDURE — 6370000000 HC RX 637 (ALT 250 FOR IP)

## 2025-07-20 PROCEDURE — 6370000000 HC RX 637 (ALT 250 FOR IP): Performed by: INTERNAL MEDICINE

## 2025-07-20 PROCEDURE — 82043 UR ALBUMIN QUANTITATIVE: CPT

## 2025-07-20 PROCEDURE — 84550 ASSAY OF BLOOD/URIC ACID: CPT

## 2025-07-20 PROCEDURE — 84443 ASSAY THYROID STIM HORMONE: CPT

## 2025-07-20 PROCEDURE — 82570 ASSAY OF URINE CREATININE: CPT

## 2025-07-20 PROCEDURE — 6360000004 HC RX CONTRAST MEDICATION: Performed by: RADIOLOGY

## 2025-07-20 PROCEDURE — 36415 COLL VENOUS BLD VENIPUNCTURE: CPT

## 2025-07-20 PROCEDURE — 87449 NOS EACH ORGANISM AG IA: CPT

## 2025-07-20 PROCEDURE — 83735 ASSAY OF MAGNESIUM: CPT

## 2025-07-20 RX ORDER — IOPAMIDOL 755 MG/ML
75 INJECTION, SOLUTION INTRAVASCULAR
Status: COMPLETED | OUTPATIENT
Start: 2025-07-20 | End: 2025-07-20

## 2025-07-20 RX ADMIN — LEVOTHYROXINE SODIUM 50 MCG: 0.05 TABLET ORAL at 05:44

## 2025-07-20 RX ADMIN — AMLODIPINE BESYLATE 10 MG: 10 TABLET ORAL at 08:00

## 2025-07-20 RX ADMIN — SUCRALFATE 1 G: 1 TABLET ORAL at 21:31

## 2025-07-20 RX ADMIN — ONDANSETRON HYDROCHLORIDE 4 MG: 4 TABLET, FILM COATED ORAL at 19:59

## 2025-07-20 RX ADMIN — SUCRALFATE 1 G: 1 TABLET ORAL at 11:19

## 2025-07-20 RX ADMIN — I-VITE, TAB 1000-60-2MG (60/BT) 1 TABLET: TAB at 08:00

## 2025-07-20 RX ADMIN — DULOXETINE 20 MG: 20 CAPSULE, DELAYED RELEASE ORAL at 08:00

## 2025-07-20 RX ADMIN — PANTOPRAZOLE SODIUM 40 MG: 40 TABLET, DELAYED RELEASE ORAL at 15:51

## 2025-07-20 RX ADMIN — APIXABAN 2.5 MG: 2.5 TABLET, FILM COATED ORAL at 08:00

## 2025-07-20 RX ADMIN — GABAPENTIN 200 MG: 100 CAPSULE ORAL at 08:00

## 2025-07-20 RX ADMIN — Medication 6 MG: at 21:28

## 2025-07-20 RX ADMIN — CALCIUM CARBONATE-VITAMIN D TAB 500 MG-200 UNIT 1 TABLET: 500-200 TAB at 21:28

## 2025-07-20 RX ADMIN — GABAPENTIN 200 MG: 100 CAPSULE ORAL at 13:13

## 2025-07-20 RX ADMIN — FAMOTIDINE 20 MG: 20 TABLET, FILM COATED ORAL at 21:28

## 2025-07-20 RX ADMIN — GABAPENTIN 200 MG: 100 CAPSULE ORAL at 21:28

## 2025-07-20 RX ADMIN — OXYCODONE 5 MG: 5 TABLET ORAL at 21:28

## 2025-07-20 RX ADMIN — IOPAMIDOL 75 ML: 755 INJECTION, SOLUTION INTRAVENOUS at 12:27

## 2025-07-20 RX ADMIN — PANTOPRAZOLE SODIUM 40 MG: 40 TABLET, DELAYED RELEASE ORAL at 05:44

## 2025-07-20 RX ADMIN — OXYCODONE 5 MG: 5 TABLET ORAL at 08:22

## 2025-07-20 RX ADMIN — APIXABAN 2.5 MG: 2.5 TABLET, FILM COATED ORAL at 21:28

## 2025-07-20 RX ADMIN — Medication 15 G: at 18:40

## 2025-07-20 RX ADMIN — SUCRALFATE 1 G: 1 TABLET ORAL at 15:51

## 2025-07-20 RX ADMIN — FLUCONAZOLE 400 MG: 150 TABLET ORAL at 07:59

## 2025-07-20 RX ADMIN — SUCRALFATE 1 G: 1 TABLET ORAL at 05:44

## 2025-07-20 RX ADMIN — FOLIC ACID 1 MG: 1 TABLET ORAL at 08:00

## 2025-07-20 RX ADMIN — CALCIUM CARBONATE-VITAMIN D TAB 500 MG-200 UNIT 1 TABLET: 500-200 TAB at 08:00

## 2025-07-20 RX ADMIN — OXYCODONE 5 MG: 5 TABLET ORAL at 15:52

## 2025-07-20 ASSESSMENT — PAIN DESCRIPTION - ORIENTATION
ORIENTATION: LOWER

## 2025-07-20 ASSESSMENT — PAIN DESCRIPTION - DESCRIPTORS
DESCRIPTORS: ACHING
DESCRIPTORS: ACHING;THROBBING
DESCRIPTORS: ACHING

## 2025-07-20 ASSESSMENT — PAIN SCALES - GENERAL
PAINLEVEL_OUTOF10: 6
PAINLEVEL_OUTOF10: 6
PAINLEVEL_OUTOF10: 8
PAINLEVEL_OUTOF10: 5
PAINLEVEL_OUTOF10: 6
PAINLEVEL_OUTOF10: 8
PAINLEVEL_OUTOF10: 8

## 2025-07-20 ASSESSMENT — PAIN DESCRIPTION - PAIN TYPE: TYPE: CHRONIC PAIN

## 2025-07-20 ASSESSMENT — PAIN DESCRIPTION - LOCATION
LOCATION: BACK

## 2025-07-20 ASSESSMENT — PAIN - FUNCTIONAL ASSESSMENT
PAIN_FUNCTIONAL_ASSESSMENT: ACTIVITIES ARE NOT PREVENTED
PAIN_FUNCTIONAL_ASSESSMENT: ACTIVITIES ARE NOT PREVENTED
PAIN_FUNCTIONAL_ASSESSMENT: PREVENTS OR INTERFERES SOME ACTIVE ACTIVITIES AND ADLS

## 2025-07-20 NOTE — PROGRESS NOTES
Informed Dr. Saba (covering for Dr. Seals) via P.S. of abnormal labs from this morning.  ID informed of labs as well.    1115-Nephrology not on, I asked Dr. Bourgeois if she wanted a consult.

## 2025-07-20 NOTE — PROGRESS NOTES
[facility] on site to transport patient. Received called from Dr. Bourgeois stating patient can no longer be discharged due to WBC result.     Jo-Ann Nguyen RN

## 2025-07-20 NOTE — PROGRESS NOTES
Internal Medicine Progress Note      Synopsis: Patient admittMs. Clau De La Vega, a 74 y.o. year old female  who  has a past medical history of Acid reflux, Analgesic rebound headache, Chronic anemia, Chronic back pain, Constipation, COPD (chronic obstructive pulmonary disease) ([facility]), GERD (gastroesophageal reflux disease), Headache(784.0), History of removal of joint prosthesis of right hip due to infection, History of sinus problem, Hyperlipidemia, Hypertension, Memory loss, short term, MSSA (methicillin susceptible Staphylococcus aureus) infection, Neuropathy, Osteoarthritis, Pain in neck, Peptic stricture of esophagus, Rheumatoid arthritis(714.0), Ringing in ears, Stage 3 chronic kidney disease (HCC), and TIA (transient ischemic attack).         This is a patient of 74 years of age she has a previous history of hyperlipidemia and osteoarthritis and also was on methotrexate for rheumatoid arthritis.  She had a total right hip arthroplasty in 2023 and had consistent issues postoperatively.  She eventually could not walk and came back to her surgeon for further care she was admitted to [facility] and diagnosed with bacteremia of methicillin sensitive Staph aureus.  She did have aspiration on May 27 and fluid was expressed and described as purulent in the cell count was not possible with the specimen however because of her positive blood cultures she had a workup with infectious disease.  There was erosive changes on imaging of the right hip of the femoral implant at the fracture of the lesser trochanter with lucency around the bone.  She was admitted and was given Teflaro from 6/4/2025 till 6/6/2025 and then went back on oxacillin.  On May 31, 2025 she had an explant with articulating antibiotic and she had operative cultures at that point that were positive for Staph aureus and eventually repeat scanning showed fluid accumulation with persistent Staph aureus.  On 6 June she went back to the OR for right hip  color flow study and spectral analysis.     Deep venous thrombosis involving the right axillary and 1 of the right brachial vein.     CT ABDOMEN PELVIS WO CONTRAST Additional Contrast? None  Result Date: 7/13/2025  EXAMINATION: CT OF THE ABDOMEN AND PELVIS WITHOUT CONTRAST 7/13/2025 5:41 am TECHNIQUE: CT of the abdomen and pelvis was performed without the administration of intravenous contrast. Multiplanar reformatted images are provided for review. Automated exposure control, iterative reconstruction, and/or weight based adjustment of the mA/kV was utilized to reduce the radiation dose to as low as reasonably achievable. COMPARISON: CT abdomen and pelvis 08/12/2024 HISTORY: ORDERING SYSTEM PROVIDED HISTORY: abd pain TECHNOLOGIST PROVIDED HISTORY: Additional Contrast?->None Reason for exam:->abd pain FINDINGS: Lower Chest: Minimal linear atelectasis bilateral lower lobes.  No basilar pleural or pericardial effusion.  Soft tissue nodules in the right breast measuring 1.2 and 1.2 cm. Organs: Lack of intravenous contrast limits evaluation of the abdominal viscera. Normal liver morphology.  Stable scattered hepatic cysts.  No intra or extrahepatic biliary dilatation.  The gallbladder is normal. Adrenals are normal.  The spleen is normal.  The pancreas is grossly normal. Normal renal morphology bilaterally.  Stable renal cysts bilaterally.  No renal calculi or hydronephrosis.  The bladder is normal. GI/Bowel: The stomach is normal.  The small bowel and colon are nondilated. The appendix is not seen. Pelvis: The uterus is atrophic or absent.  No suspicious adnexal masses. Peritoneum/Retroperitoneum: No bulky abdominal or pelvic lymphadenopathy.  No ascites or pneumoperitoneum. Bones/Soft Tissues: Surgical staples overlie the right hip.  There is skin thickening of the lateral right hip with a deep subcutaneous collection measuring 5.0 x 2.8 x 11.9 cm.     No acute finding in the abdomen or pelvis. Postsurgical changes of

## 2025-07-20 NOTE — CARE COORDINATION
CARE MANAGEMENT..... Auth obtained for [facility]. Checking to see if facility can accept today. Will follow.       UPDATE, 8:10... Facility can accept patient. Charge nurse notified and will arrange transport. Will notify facility with time once arranged.     UPDATE, 08:25... Transport arranged for 09:30. Facility notified.     UPDATE, 09:30.... Received notification from Charge nurse that discharge is now on hold d/t elevated WBC from yesterday's labs. ID consulted and labs ordered. Facility notified. Will follow.

## 2025-07-20 NOTE — PLAN OF CARE
Problem: Pain  Goal: Verbalizes/displays adequate comfort level or baseline comfort level  7/20/2025 0029 by Alisa Alexis RN  Outcome: Progressing     Problem: Skin/Tissue Integrity  Goal: Skin integrity remains intact  Description: 1.  Monitor for areas of redness and/or skin breakdown  2.  Assess vascular access sites hourly  3.  Every 4-6 hours minimum:  Change oxygen saturation probe site  4.  Every 4-6 hours:  If on nasal continuous positive airway pressure, respiratory therapy assess nares and determine need for appliance change or resting period  7/20/2025 0029 by Alisa Alexis, RN  Outcome: Progressing

## 2025-07-20 NOTE — CONSULTS
[facility]  Nephrology Consult Note  Patient's Name: Clau De La Vega  1:50 PM  7/20/2025    Nephrologist: none    Reason for Consult:  multiple electrolyte abnormalities + met acidosis  Requesting Physician: Alexa Bourgeois MD    Chief Complaint:  hip pain, recent would infection, bloody stools    History Obtained From:  pt, chart    History of Present Illness:    Clau De La Vega is a 74 y.o. female with past medical history of hypertension, COPD, hyperlipidemia, rheumatoid arthritis, osteoarthritis of right hip status post SLAVA 2023, longstanding GERD.  It appeared she had some element of chronic kidney disease dating many years back into the 1997-8487 range when her creatinine seem to vary between 1.0 and 1.3 mg/dL.  It does not appear she has seen a nephrologist for many years.    Patient's clinical condition worsened over the past several months.  She underwent right hip explant with spacer placement on 5/30.  Clinical course complicated by MSSA septicemia.  Now s/p R hip extensive I&D on 6/6/25 with Dr. Castillo, now s/p right hip I&D w/ head exchange on 6/11 with Dr. Steven.  She was discharged to an LTAC on 6/16 and then went to [facility]  She was discharged on 6 months of Diflucan and oxacillin through 7/15 when she was at [facility].  Patient was at [facility] for about 1 month but then she was brought in on 7/13 to [facility] due to bloody stools.  She had been on a heparin drip since 7/10 due to a blood clot in the right arm where a previous midline had been placed.  She underwent an EGD and colonoscopy on 7/15 which showed no upper or lower GI bleeding.  A capsule endoscopy to be done as an outpatient was recommended.  She was to be discharged over the weekend however she had not had labs for several days, notably she was given potassium chloride twice daily for replacement of low potassium and was receiving it for 2 of the days she did not have blood work drawn.  Her

## 2025-07-20 NOTE — PROGRESS NOTES
Attempted to call [facility] for nurse to nurse however no answer and then disconnected. [phone]. I will try again. Called daughter, Steve and informed her of transfer to facility.     1030- called [facility] and informed them that this patient will not be coming today d/t abnormal labs. I also informed patient and daughter, Steve.

## 2025-07-21 VITALS
HEIGHT: 58 IN | RESPIRATION RATE: 18 BRPM | OXYGEN SATURATION: 97 % | BODY MASS INDEX: 25.92 KG/M2 | WEIGHT: 123.46 LBS | HEART RATE: 98 BPM | TEMPERATURE: 98.4 F | SYSTOLIC BLOOD PRESSURE: 133 MMHG | DIASTOLIC BLOOD PRESSURE: 60 MMHG

## 2025-07-21 LAB
ALBUMIN SERPL-MCNC: 2.8 G/DL (ref 3.5–5.2)
ALP SERPL-CCNC: 124 U/L (ref 35–104)
ALT SERPL-CCNC: 6 U/L (ref 0–35)
ANION GAP SERPL CALCULATED.3IONS-SCNC: 11 MMOL/L (ref 7–16)
AST SERPL-CCNC: 17 U/L (ref 0–35)
BILIRUB SERPL-MCNC: 0.3 MG/DL (ref 0–1.2)
BUN SERPL-MCNC: 35 MG/DL (ref 8–23)
CALCIUM SERPL-MCNC: 9.3 MG/DL (ref 8.8–10.2)
CHLORIDE SERPL-SCNC: 102 MMOL/L (ref 98–107)
CO2 SERPL-SCNC: 16 MMOL/L (ref 22–29)
CORTIS SERPL-MCNC: 24.1 UG/DL (ref 2.7–18.4)
CREAT SERPL-MCNC: 1.1 MG/DL (ref 0.5–1)
ERYTHROCYTE [DISTWIDTH] IN BLOOD BY AUTOMATED COUNT: 17.4 % (ref 11.5–15)
GFR, ESTIMATED: 53 ML/MIN/1.73M2
GLUCOSE SERPL-MCNC: 87 MG/DL (ref 74–99)
HCT VFR BLD AUTO: 27 % (ref 34–48)
HGB BLD-MCNC: 8.4 G/DL (ref 11.5–15.5)
MCH RBC QN AUTO: 27.7 PG (ref 26–35)
MCHC RBC AUTO-ENTMCNC: 31.1 G/DL (ref 32–34.5)
MCV RBC AUTO: 89.1 FL (ref 80–99.9)
PLATELET # BLD AUTO: 682 K/UL (ref 130–450)
PMV BLD AUTO: 8.9 FL (ref 7–12)
POTASSIUM SERPL-SCNC: 4.8 MMOL/L (ref 3.5–5.1)
PROT SERPL-MCNC: 7.3 G/DL (ref 6.4–8.3)
RBC # BLD AUTO: 3.03 M/UL (ref 3.5–5.5)
SODIUM SERPL-SCNC: 130 MMOL/L (ref 136–145)
WBC OTHER # BLD: 19 K/UL (ref 4.5–11.5)

## 2025-07-21 PROCEDURE — 80053 COMPREHEN METABOLIC PANEL: CPT

## 2025-07-21 PROCEDURE — 85027 COMPLETE CBC AUTOMATED: CPT

## 2025-07-21 PROCEDURE — 82533 TOTAL CORTISOL: CPT

## 2025-07-21 PROCEDURE — 6370000000 HC RX 637 (ALT 250 FOR IP): Performed by: INTERNAL MEDICINE

## 2025-07-21 PROCEDURE — 6370000000 HC RX 637 (ALT 250 FOR IP): Performed by: STUDENT IN AN ORGANIZED HEALTH CARE EDUCATION/TRAINING PROGRAM

## 2025-07-21 PROCEDURE — 36415 COLL VENOUS BLD VENIPUNCTURE: CPT

## 2025-07-21 PROCEDURE — 6370000000 HC RX 637 (ALT 250 FOR IP)

## 2025-07-21 PROCEDURE — 2060000000 HC ICU INTERMEDIATE R&B

## 2025-07-21 RX ORDER — SODIUM BICARBONATE 650 MG/1
1300 TABLET ORAL 3 TIMES DAILY
Status: DISCONTINUED | OUTPATIENT
Start: 2025-07-21 | End: 2025-07-22 | Stop reason: HOSPADM

## 2025-07-21 RX ADMIN — FLUCONAZOLE 400 MG: 150 TABLET ORAL at 10:18

## 2025-07-21 RX ADMIN — I-VITE, TAB 1000-60-2MG (60/BT) 1 TABLET: TAB at 10:19

## 2025-07-21 RX ADMIN — GABAPENTIN 200 MG: 100 CAPSULE ORAL at 20:54

## 2025-07-21 RX ADMIN — SUCRALFATE 1 G: 1 TABLET ORAL at 20:55

## 2025-07-21 RX ADMIN — AMLODIPINE BESYLATE 10 MG: 10 TABLET ORAL at 10:19

## 2025-07-21 RX ADMIN — FERROUS SULFATE TAB 325 MG (65 MG ELEMENTAL FE) 325 MG: 325 (65 FE) TAB at 10:19

## 2025-07-21 RX ADMIN — SUCRALFATE 1 G: 1 TABLET ORAL at 06:05

## 2025-07-21 RX ADMIN — CALCIUM CARBONATE-VITAMIN D TAB 500 MG-200 UNIT 1 TABLET: 500-200 TAB at 10:19

## 2025-07-21 RX ADMIN — FAMOTIDINE 20 MG: 20 TABLET, FILM COATED ORAL at 20:55

## 2025-07-21 RX ADMIN — OXYCODONE 5 MG: 5 TABLET ORAL at 20:55

## 2025-07-21 RX ADMIN — LEVOTHYROXINE SODIUM 50 MCG: 0.05 TABLET ORAL at 06:05

## 2025-07-21 RX ADMIN — SUCRALFATE 1 G: 1 TABLET ORAL at 10:19

## 2025-07-21 RX ADMIN — Medication 6 MG: at 20:54

## 2025-07-21 RX ADMIN — PANTOPRAZOLE SODIUM 40 MG: 40 TABLET, DELAYED RELEASE ORAL at 17:22

## 2025-07-21 RX ADMIN — Medication 15 G: at 10:18

## 2025-07-21 RX ADMIN — GABAPENTIN 200 MG: 100 CAPSULE ORAL at 10:19

## 2025-07-21 RX ADMIN — SODIUM BICARBONATE 1300 MG: 650 TABLET ORAL at 20:55

## 2025-07-21 RX ADMIN — SUCRALFATE 1 G: 1 TABLET ORAL at 17:22

## 2025-07-21 RX ADMIN — GABAPENTIN 200 MG: 100 CAPSULE ORAL at 15:25

## 2025-07-21 RX ADMIN — APIXABAN 2.5 MG: 2.5 TABLET, FILM COATED ORAL at 10:19

## 2025-07-21 RX ADMIN — DULOXETINE 20 MG: 20 CAPSULE, DELAYED RELEASE ORAL at 10:19

## 2025-07-21 RX ADMIN — PANTOPRAZOLE SODIUM 40 MG: 40 TABLET, DELAYED RELEASE ORAL at 06:05

## 2025-07-21 RX ADMIN — FOLIC ACID 1 MG: 1 TABLET ORAL at 10:19

## 2025-07-21 RX ADMIN — SODIUM BICARBONATE 1300 MG: 650 TABLET ORAL at 17:22

## 2025-07-21 RX ADMIN — CALCIUM CARBONATE-VITAMIN D TAB 500 MG-200 UNIT 1 TABLET: 500-200 TAB at 20:55

## 2025-07-21 RX ADMIN — OXYCODONE 5 MG: 5 TABLET ORAL at 10:18

## 2025-07-21 ASSESSMENT — PAIN SCALES - GENERAL
PAINLEVEL_OUTOF10: 8
PAINLEVEL_OUTOF10: 7
PAINLEVEL_OUTOF10: 8

## 2025-07-21 ASSESSMENT — PAIN DESCRIPTION - ORIENTATION
ORIENTATION: LOWER
ORIENTATION: LOWER

## 2025-07-21 ASSESSMENT — PAIN DESCRIPTION - LOCATION
LOCATION: BACK
LOCATION: BACK

## 2025-07-21 ASSESSMENT — PAIN DESCRIPTION - DESCRIPTORS: DESCRIPTORS: ACHING;GNAWING;SORE

## 2025-07-21 NOTE — PROGRESS NOTES
[facility]  Nephrology Progress Note  Patient's Name: Clau De La Vega  3:51 PM  7/21/2025    Nephrologist: none    Reason for Consult:  multiple electrolyte abnormalities + met acidosis  Requesting Physician: Alexa Bourgeois MD    Chief Complaint:  hip pain, recent would infection, bloody stools    History Obtained From:  pt, chart    History of Present Illness from the 7/20/25 note:    Clau De La Vega is a 74 y.o. female with past medical history of hypertension, COPD, hyperlipidemia, rheumatoid arthritis, osteoarthritis of right hip status post SLAVA 2023, longstanding GERD.  It appeared she had some element of chronic kidney disease dating many years back into the 3332-2063 range when her creatinine seem to vary between 1.0 and 1.3 mg/dL.  It does not appear she has seen a nephrologist for many years.    Patient's clinical condition worsened over the past several months.  She underwent right hip explant with spacer placement on 5/30.  Clinical course complicated by MSSA septicemia.  Now s/p R hip extensive I&D on 6/6/25 with Dr. Castillo, now s/p right hip I&D w/ head exchange on 6/11 with Dr. Steven.  She was discharged to an LTAC on 6/16 and then went to [facility].  She was discharged on 6 months of Diflucan and oxacillin through 7/15 when she was at [facility].  Patient was at [facility] for about 1 month but then she was brought in on 7/13 to [facility] due to bloody stools.  She had been on a heparin drip since 7/10 due to a blood clot in the right arm where a previous midline had been placed.  She underwent an EGD and colonoscopy on 7/15 which showed no upper or lower GI bleeding.  A capsule endoscopy to be done as an outpatient was recommended.  She was to be discharged over the weekend however she had not had labs for several days, notably she was given potassium chloride twice daily for replacement of low potassium and was receiving it for 2 of the days she did not have  5.8 05/28/2025 07:34 AM     Microalbumen/Creatinine ratio:  No components found for: \"RUCREAT\"  FLP:    Lab Results   Component Value Date/Time    TRIG 121 05/28/2025 07:34 AM    HDL 19 05/28/2025 07:34 AM     TSH:    Lab Results   Component Value Date/Time    TSH 3.09 07/20/2025 09:35 AM     VITAMIN B12: No components found for: \"B12\"  FOLATE:    Lab Results   Component Value Date/Time    FOLATE 18.6 05/28/2025 07:34 AM     IRON:    Lab Results   Component Value Date/Time    IRON 11 05/28/2025 07:34 AM     Iron Saturation:  No components found for: \"PERCENTFE\"  TIBC:    Lab Results   Component Value Date/Time    TIBC 133 05/28/2025 07:34 AM     FERRITIN:    Lab Results   Component Value Date/Time    FERRITIN 1,403 05/28/2025 09:19 AM        Imaging:  Pertinent imaging reviewed    Assessment/Plan:    1-hyponatremia, unclear if acute versus chronic; last BMP on 7/17  -Patient euvolemic on exam  - Sodium today 130  - Thyroid function acceptable, uric acid 5.5 not low to suggest SIADH, Cortisol 24  -UA SG <1.005  land  -Maria [place]+ 61 with Uosm 304 greater than calculated Serum osm 268  - Previous workup on 5/28 showed urine osmolarity 389 and urine sodium less than 20  PLAN:  - Continue fluid restriction of 1.5 L  -Follow BMP  -Hold the Ure-Na until Sodium back on 7/22    2-metabolic acidosis without anion gap  -Acidosis has persisted over the last 1 to 2 months  -This could be from rifampin which has some association with dRTA/hyperkalemia (along with AIN)  -Urine pH 6.5 when the serum HCO3 14 and the Urine AG (+) suggesting an RTA  PLAN:  -sodium bicarbonate supplementation   -Follow BMP    3-hyperkalemia  - Sec to RTA  - Cortisol 24  PLAN:  - Withhold further potassium supplementation  - Follow labs      4-HTN w/CKD G1-G4  - Blood pressures goal <130/80(ACC/AHA Target)-at/near goal  PLAN:  - Continue amlodipine 10 mg daily      Thank you Alexa Bourgeois MD for allowing us to participate in care of Clau De La Vega        Nupur Seals MD  3:51 PM  7/21/2025

## 2025-07-21 NOTE — PLAN OF CARE
Problem: Pain  Goal: Verbalizes/displays adequate comfort level or baseline comfort level  7/21/2025 0002 by Rosie Mendiola RN  Outcome: Progressing  7/20/2025 1029 by Kenna Goodwin RN  Outcome: Progressing     Problem: Skin/Tissue Integrity  Goal: Skin integrity remains intact  Description: 1.  Monitor for areas of redness and/or skin breakdown  2.  Assess vascular access sites hourly  3.  Every 4-6 hours minimum:  Change oxygen saturation probe site  4.  Every 4-6 hours:  If on nasal continuous positive airway pressure, respiratory therapy assess nares and determine need for appliance change or resting period  7/21/2025 0002 by Rosie Mendiola RN  Outcome: Progressing  7/20/2025 1029 by Kenna Goodwin RN  Outcome: Progressing  Flowsheets (Taken 7/20/2025 0800)  Skin Integrity Remains Intact: Monitor for areas of redness and/or skin breakdown     Problem: Safety - Adult  Goal: Free from fall injury  7/21/2025 0002 by Rosie Mendiola RN  Outcome: Progressing  7/20/2025 1029 by Kenna Goodwin RN  Outcome: Progressing  Flowsheets (Taken 7/20/2025 0800)  Free From Fall Injury: Instruct family/caregiver on patient safety     Problem: ABCDS Injury Assessment  Goal: Absence of physical injury  7/21/2025 0002 by Rosie Mendiola RN  Outcome: Progressing  7/20/2025 1029 by Kenna Goodwin RN  Outcome: Progressing  Flowsheets (Taken 7/20/2025 0800)  Absence of Physical Injury: Implement safety measures based on patient assessment     Problem: Nutrition Deficit:  Goal: Optimize nutritional status  7/21/2025 0002 by Rosie Mendiola RN  Outcome: Progressing  7/20/2025 1029 by Kenna Goodwin RN  Outcome: Progressing

## 2025-07-21 NOTE — CARE COORDINATION
Social Work / Discharge PLanning :Pre-cert was obtained over the weekend for  [facility]. . Careport message sent to see the length of pre-cert .Await response. SW to follow. Electronically signed by NATASHA Mullen on 7/21/25 at 10:12 AM EDT    No

## 2025-07-21 NOTE — PLAN OF CARE
Problem: Pain  Goal: Verbalizes/displays adequate comfort level or baseline comfort level  7/21/2025 1250 by Emely Mg, RN  Outcome: Progressing  7/21/2025 0002 by Rosie Mendiola, RN  Outcome: Progressing

## 2025-07-21 NOTE — PROGRESS NOTES
Comprehensive Nutrition Assessment    Type and Reason for Visit:  Reassess    Nutrition Recommendations/Plan:   Modified ONS: Changed Wound Healing ONS BID to Expedite Cup QD d/t FR + Changed Frozen ONS to Fortified Gelatin per pt preference  Continue Diet Per Orders  Continue Inpatient Monitoring     Malnutrition Assessment:  Malnutrition Status:  Moderate malnutrition (07/15/25 1432)    Context:  Acute Illness     Findings of the 6 clinical characteristics of malnutrition:  Energy Intake:  Mild decrease in energy intake  Weight Loss:  Mild weight loss     Body Fat Loss:  Mild body fat loss Triceps, Orbital   Muscle Mass Loss:  Mild muscle mass loss Clavicles (pectoralis & deltoids), Scapula (trapezius)  Fluid Accumulation:  Unable to assess (Multifactorial)     Strength:  Not Performed    Nutrition Assessment:    Pt feeling better, variable meal intake per flowsheets + intake clipboard %, pt states if she does not like something she will not eat, enc preferences w/meals, reviewed diet w/pt ordered 1500ml FR 7/20, modified ONS changed wound healing ONS to Expedite Cup + Frozen ONS to Foritified Gelatin per pt preference, enc ONS intake, educated + reinforced need for calories/protein for healing, prevent unintentional wt loss-pt verbalized understanding    Nutrition Related Findings:    I/O +1L, A/O x4, +BS +BM 7/21, trace BLE edema Na 130-ordered 1500ml FR, K+ 4.8, CO2 16, BUN 35, Cr 1.1, Ocuvite, diflucan, folic acid, oyster shell calcium w/Vit D Wound Type: Open Wounds, Surgical Incision, Wound Consult Pending (Coccyx, R hip)       Current Nutrition Intake & Therapies:    Average Meal Intake: 51-75%, %  Average Supplements Intake: 51-75%, 0%  ADULT DIET; Regular; 1500 ml  ADULT ORAL NUTRITION SUPPLEMENT; Breakfast; Other Oral Supplement; Expedite Cup  ADULT ORAL NUTRITION SUPPLEMENT; Lunch, Dinner; Fortified Gelatin Oral Supplement    Anthropometric Measures:  Height: 147.3 cm (4' 9.99\")  Ideal

## 2025-07-21 NOTE — PROGRESS NOTES
[facility]  [facility]  Progress Note    SUBJECTIVE:  Chief Complaint   Patient presents with    GI Problem     Per [facility] pt has had blood in her stools, emesis and nose bleeds since starting Heparin drip on 7/10 (due to blood clot in the right arm where previous midline was placed). Pt on IV ATB for right hip infection.       Patient is tolerating medications. No reported adverse drug reactions.  No nausea, vomiting, diarrhea.  Denies any urinary complaints  Denies hip pain  No fevers  Says she is feeling really good and is hoping to leave soon    Review of systems:  As stated above in the chief complaint, otherwise negative.    Medications:  Scheduled Meds:   urea  15 g Oral Daily    apixaban  2.5 mg Oral BID    pantoprazole  40 mg Oral BID AC    sucralfate  1 g Oral 4x Daily AC & HS    amLODIPine  10 mg Oral Daily    oyster shell calcium w/D  1 tablet Oral BID    DULoxetine  20 mg Oral Daily    famotidine  20 mg Oral Daily    ferrous sulfate  325 mg Oral Every Other Day    fluconazole  400 mg Oral Daily    folic acid  1 mg Oral Daily    gabapentin  200 mg Oral TID    levothyroxine  50 mcg Oral Daily    ocuvite-lutein  1 tablet Oral Daily     Continuous Infusions:   sodium chloride      sodium chloride       PRN Meds:sodium chloride, acetaminophen, aluminum & magnesium hydroxide-simethicone, diphenhydrAMINE, melatonin, methocarbamol, ondansetron, oxyCODONE, sodium chloride    OBJECTIVE:  BP (!) 145/69   Pulse (!) 104   Temp 98.2 °F (36.8 °C)   Resp 20   Ht 1.473 m (4' 9.99\")   Wt 62.2 kg (137 lb 2 oz)   SpO2 99%   BMI 28.67 kg/m²   Temp  Av.5 °F (36.9 °C)  Min: 97.5 °F (36.4 °C)  Max: 99.5 °F (37.5 °C)  Constitutional: The patient is awake, alert, and oriented.  Sitting up in bed just finished lunch.  Skin: Warm and dry. No rashes were noted.   HEENT: Round and reactive pupils.  Moist mucous membranes.  No ulcerations or thrush.  Neck: Supple to movements.

## 2025-07-21 NOTE — PROGRESS NOTES
Internal Medicine Progress Note      Synopsis: Patient admittMs. Clau De La Vega, a 74 y.o. year old female  who  has a past medical history of Acid reflux, Analgesic rebound headache, Chronic anemia, Chronic back pain, Constipation, COPD (chronic obstructive pulmonary disease) ([facility]), GERD (gastroesophageal reflux disease), Headache(784.0), History of removal of joint prosthesis of right hip due to infection, History of sinus problem, Hyperlipidemia, Hypertension, Memory loss, short term, MSSA (methicillin susceptible Staphylococcus aureus) infection, Neuropathy, Osteoarthritis, Pain in neck, Peptic stricture of esophagus, Rheumatoid arthritis(714.0), Ringing in ears, Stage 3 chronic kidney disease (HCC), and TIA (transient ischemic attack).         This is a patient of 74 years of age she has a previous history of hyperlipidemia and osteoarthritis and also was on methotrexate for rheumatoid arthritis.  She had a total right hip arthroplasty in 2023 and had consistent issues postoperatively.  She eventually could not walk and came back to her surgeon for further care she was admitted to [facility] and diagnosed with bacteremia of methicillin sensitive Staph aureus.  She did have aspiration on May 27 and fluid was expressed and described as purulent in the cell count was not possible with the specimen however because of her positive blood cultures she had a workup with infectious disease.  There was erosive changes on imaging of the right hip of the femoral implant at the fracture of the lesser trochanter with lucency around the bone.  She was admitted and was given Teflaro from 6/4/2025 till 6/6/2025 and then went back on oxacillin.  On May 31, 2025 she had an explant with articulating antibiotic and she had operative cultures at that point that were positive for Staph aureus and eventually repeat scanning showed fluid accumulation with persistent Staph aureus.  On 6 June she went back to the OR for right hip  abnormality. 2. Mild acromioclavicular and glenohumeral joint osteoarthritis. 3. Low attenuating nodular foci partially visualized at the lateral inferior right breast tissue. Recommend correlation with outpatient diagnostic breast imaging.        ASSESSMENT:    Principal Problem:    GIB (gastrointestinal bleeding)  Active Problems:    Melena    Moderate protein-calorie malnutrition    DVT of right axillary vein, acute (HCC)  Resolved Problems:    * No resolved hospital problems. *       PLAN:    She is still stable cardiovascularly  Hemoglobin is stable at 7.1  Electrolytes are okay for now  Mild acidosis  Creatinine hovering at 1.3 but better than she was yesterday and CKD is her baseline  Awaiting scopes tomorrow both upper and lower.  Maintaining her treatment for MSSA septicemia with right hip prosthetic joint infection and she is finishing her nafcillin tomorrow  No anticoagulation for right upper extremity DVT.  [facility] now did show the same clot and vascular on board  July 15  Awaiting her scopes today  Resumption of anticoagulation only after scopes are clear and if okay with surgery  Finishing her previous antibiotics  Otherwise not in any distress  No other changes in her hypertension management and her acute kidney injury has improved   She does have baseline chronic kidney disease  July 16  She appears okay today  She will be finishing antibiotics by tomorrow and she will be discharged to skilled facility  Hemoglobin stable  Anticoagulate for the right upper extremity DVT recommendations awaited.  Vascular surgeon messaged  Aggressive replacement of potassium likely losses due to GI cocktail for her to have a colonoscopy.  Noted the colonoscopy prep was not complete and Dr. Ellsworth recommended repeat in 5 years if needed  July 17  Patient is looking well today  She is finishing her antibiotics.  Awaiting vascular for giving us recommendations on her right upper extremity DVT.  I  did resume her aspirin which was being used for DVT prophylaxis as per orthopedic surgery.  antiBiotics are done as of yesterday with  nafcillin and rifampin.  Diflucan will be continued at least for 6 months  I am unable to plan for discharge without vascular input  She does have a nursing home that requires a presteroid and we are awaiting the  to come through  Labs are stable  July 18   Vascular did recommend low-dose anticoagulation for 3 months which I did start this morning  for  rt upper arm dvt  Awaiting precertification to go through  Done on her antibiotics  Not with any recurrent fevers  Ampac scores are low still   Labs  are stable  She remains on room air  July 19  Stable overall.  Awaiting precertification  [place] removed and she still has sutures in which the nursing staff are removing shortly  She really has no complaints and hemoglobin stable but we can recheck labs today since it looks like she may not be leaving today because of lack of precertification  Negative for fever  Discussed with patient and she is comfortable  July 20  She is feeling well and we can start to plan her discharge and precertification has been received so she may discharge  I just realized her yesterday's labs did show a higher WBC count so I would like ID to see her again before she leaves  D w nursing staff   Bmp is also pending  July 21  PVCs remain high.  She went from 21,000-19,000 and she is still holding ground.  Not positive for any fevers or chills but there is concern on imaging for right hip site of increased collection possibly an abscess.  An interventional radiology supposed to evaluate this.  Long discussion with the patient at bedside.  Original surgery was in [place] and it may be wiser to send her up there but I need to have clearance from infectious disease.  I reached out to them and awaiting their answer  Appreciate input of nephrology for low sodium  Rifampin induced renal tubular acidosis

## 2025-07-22 NOTE — PROGRESS NOTES
Patient exiting facility with [facility] to transport to [facility] to unit H70 room 4.  Nurse to nurse report called.

## 2025-07-23 LAB
1,3 BETA GLUCAN SER-MCNC: 35 PG/ML
1,3 BETA-D-GLUCAN INTERP: NEGATIVE

## 2025-07-23 NOTE — DISCHARGE SUMMARY
Internal Medicine Discharge Summary    Patient ID: Clau De La Vega      Patient's PCP: Kelly Shaw MD    Admit Date: 7/13/2025     Discharge Date: 7/22/2025    Her discharge date from the hospital was 22nd but I did start to plan her discharge and already overlapped her care on the 21st.  [facility] could not come to get her until the early hours of 22nd morning    Admitting Physician: Alexa Bourgeois MD     Discharge Physician: Alexa Bourgeois MD     Discharge Diagnoses:       Active Hospital Problems    Diagnosis Date Noted    DVT of right axillary vein, acute (HCC) [I82.A11] 07/17/2025    Moderate protein-calorie malnutrition [E44.0] 07/15/2025    Melena [K92.1] 07/14/2025    GIB (gastrointestinal bleeding) [K92.2] 07/13/2025       The patient was seen and examined on day of discharge and this discharge summary is in conjunction with any daily progress note from day of discharge.    Hospital Course: atient admittMs. Clau De La Vega, a 74 y.o. year old female  who  has a past medical history of Acid reflux, Analgesic rebound headache, Chronic anemia, Chronic back pain, Constipation, COPD (chronic obstructive pulmonary disease) (HCC), GERD (gastroesophageal reflux disease), Headache(784.0), History of removal of joint prosthesis of right hip due to infection, History of sinus problem, Hyperlipidemia, Hypertension, Memory loss, short term, MSSA (methicillin susceptible Staphylococcus aureus) infection, Neuropathy, Osteoarthritis, Pain in neck, Peptic stricture of esophagus, Rheumatoid arthritis(714.0), Ringing in ears, Stage 3 chronic kidney disease (HCC), and TIA (transient ischemic attack).         This is a patient of 74 years of age she has a previous history of hyperlipidemia and osteoarthritis and also was on methotrexate for rheumatoid arthritis.  She had a total right hip arthroplasty in 2023 and had consistent issues postoperatively.  She eventually could not walk and came back to her

## (undated) DEVICE — GAUZE,SPONGE,4"X4",4PLY,STRL,LF: Brand: MEDLINE

## (undated) DEVICE — FORCEPS BX OVL CUP FEN DISPOSABLE CAP L 160CM RAD JAW 4

## (undated) DEVICE — GLOVE ORTHO 8   MSG9480

## (undated) DEVICE — GRADUATE TRIANG MEASURE 1000ML BLK PRNT

## (undated) DEVICE — BLOCK BITE 60FR RUBBER ADLT DENTAL